# Patient Record
Sex: FEMALE | Race: WHITE | NOT HISPANIC OR LATINO | Employment: OTHER | URBAN - METROPOLITAN AREA
[De-identification: names, ages, dates, MRNs, and addresses within clinical notes are randomized per-mention and may not be internally consistent; named-entity substitution may affect disease eponyms.]

---

## 2017-01-21 ENCOUNTER — COMMUNICATION - HEALTHEAST (OUTPATIENT)
Dept: INTERNAL MEDICINE | Facility: CLINIC | Age: 72
End: 2017-01-21

## 2017-01-21 DIAGNOSIS — F34.1 DYSTHYMIC DISORDER: ICD-10-CM

## 2017-01-28 ENCOUNTER — COMMUNICATION - HEALTHEAST (OUTPATIENT)
Dept: INTERNAL MEDICINE | Facility: CLINIC | Age: 72
End: 2017-01-28

## 2017-01-28 DIAGNOSIS — I10 HTN (HYPERTENSION): ICD-10-CM

## 2017-01-30 ENCOUNTER — COMMUNICATION - HEALTHEAST (OUTPATIENT)
Dept: INTERNAL MEDICINE | Facility: CLINIC | Age: 72
End: 2017-01-30

## 2017-01-30 DIAGNOSIS — E78.5 HYPERLIPIDEMIA: ICD-10-CM

## 2017-02-06 ENCOUNTER — COMMUNICATION - HEALTHEAST (OUTPATIENT)
Dept: NURSING | Facility: CLINIC | Age: 72
End: 2017-02-06

## 2017-02-06 DIAGNOSIS — M54.16 LUMBAR RADICULAR PAIN: ICD-10-CM

## 2017-02-07 ENCOUNTER — COMMUNICATION - HEALTHEAST (OUTPATIENT)
Dept: INTERNAL MEDICINE | Facility: CLINIC | Age: 72
End: 2017-02-07

## 2017-02-07 DIAGNOSIS — M54.16 LUMBAR RADICULAR PAIN: ICD-10-CM

## 2017-02-07 DIAGNOSIS — E11.9 DIABETES (H): ICD-10-CM

## 2017-02-20 ENCOUNTER — COMMUNICATION - HEALTHEAST (OUTPATIENT)
Dept: INTERNAL MEDICINE | Facility: CLINIC | Age: 72
End: 2017-02-20

## 2017-02-20 DIAGNOSIS — E11.9 DIABETES (H): ICD-10-CM

## 2017-02-22 ENCOUNTER — COMMUNICATION - HEALTHEAST (OUTPATIENT)
Dept: NURSING | Facility: CLINIC | Age: 72
End: 2017-02-22

## 2017-02-23 ENCOUNTER — AMBULATORY - HEALTHEAST (OUTPATIENT)
Dept: CARE COORDINATION | Facility: CLINIC | Age: 72
End: 2017-02-23

## 2017-02-24 ENCOUNTER — OFFICE VISIT - HEALTHEAST (OUTPATIENT)
Dept: INTERNAL MEDICINE | Facility: CLINIC | Age: 72
End: 2017-02-24

## 2017-02-24 DIAGNOSIS — E78.5 HLD (HYPERLIPIDEMIA): ICD-10-CM

## 2017-02-24 DIAGNOSIS — I10 HTN (HYPERTENSION): ICD-10-CM

## 2017-02-24 DIAGNOSIS — N18.30 CKD (CHRONIC KIDNEY DISEASE) STAGE 3, GFR 30-59 ML/MIN (H): ICD-10-CM

## 2017-02-24 DIAGNOSIS — Z23 NEED FOR INFLUENZA VACCINATION: ICD-10-CM

## 2017-02-24 DIAGNOSIS — E83.42 HYPOMAGNESEMIA: ICD-10-CM

## 2017-02-24 DIAGNOSIS — M70.61 TROCHANTERIC BURSITIS OF BOTH HIPS: ICD-10-CM

## 2017-02-24 DIAGNOSIS — E11.9 DM TYPE 2 (DIABETES MELLITUS, TYPE 2) (H): ICD-10-CM

## 2017-02-24 DIAGNOSIS — M70.62 TROCHANTERIC BURSITIS OF BOTH HIPS: ICD-10-CM

## 2017-02-24 DIAGNOSIS — Z72.0 TOBACCO ABUSE: ICD-10-CM

## 2017-02-24 DIAGNOSIS — R19.7 DIARRHEA: ICD-10-CM

## 2017-02-24 DIAGNOSIS — M19.90 OA (OSTEOARTHRITIS): ICD-10-CM

## 2017-02-24 DIAGNOSIS — Z12.31 VISIT FOR SCREENING MAMMOGRAM: ICD-10-CM

## 2017-02-24 DIAGNOSIS — F34.1 DYSTHYMIA: ICD-10-CM

## 2017-02-24 LAB — HBA1C MFR BLD: 6.8 % (ref 3.5–6)

## 2017-02-26 ENCOUNTER — COMMUNICATION - HEALTHEAST (OUTPATIENT)
Dept: INTERNAL MEDICINE | Facility: CLINIC | Age: 72
End: 2017-02-26

## 2017-02-28 ENCOUNTER — HOSPITAL ENCOUNTER (OUTPATIENT)
Dept: MAMMOGRAPHY | Facility: HOSPITAL | Age: 72
Discharge: HOME | End: 2017-02-28
Attending: INTERNAL MEDICINE

## 2017-02-28 DIAGNOSIS — Z12.31 VISIT FOR SCREENING MAMMOGRAM: ICD-10-CM

## 2017-04-05 ENCOUNTER — COMMUNICATION - HEALTHEAST (OUTPATIENT)
Dept: SCHEDULING | Facility: CLINIC | Age: 72
End: 2017-04-05

## 2017-04-05 ENCOUNTER — COMMUNICATION - HEALTHEAST (OUTPATIENT)
Dept: INTERNAL MEDICINE | Facility: CLINIC | Age: 72
End: 2017-04-05

## 2017-04-05 DIAGNOSIS — E11.9 DM TYPE 2 (DIABETES MELLITUS, TYPE 2) (H): ICD-10-CM

## 2017-04-06 ENCOUNTER — COMMUNICATION - HEALTHEAST (OUTPATIENT)
Dept: SCHEDULING | Facility: CLINIC | Age: 72
End: 2017-04-06

## 2017-04-07 ENCOUNTER — RECORDS - HEALTHEAST (OUTPATIENT)
Dept: ADMINISTRATIVE | Facility: OTHER | Age: 72
End: 2017-04-07

## 2017-05-15 ENCOUNTER — COMMUNICATION - HEALTHEAST (OUTPATIENT)
Dept: INTERNAL MEDICINE | Facility: CLINIC | Age: 72
End: 2017-05-15

## 2017-05-15 DIAGNOSIS — E11.9 DIABETES (H): ICD-10-CM

## 2017-05-25 ENCOUNTER — COMMUNICATION - HEALTHEAST (OUTPATIENT)
Dept: INTERNAL MEDICINE | Facility: CLINIC | Age: 72
End: 2017-05-25

## 2017-05-25 DIAGNOSIS — E11.9 DM TYPE 2 (DIABETES MELLITUS, TYPE 2) (H): ICD-10-CM

## 2017-06-27 ENCOUNTER — COMMUNICATION - HEALTHEAST (OUTPATIENT)
Dept: INTERNAL MEDICINE | Facility: CLINIC | Age: 72
End: 2017-06-27

## 2017-07-18 ENCOUNTER — COMMUNICATION - HEALTHEAST (OUTPATIENT)
Dept: INTERNAL MEDICINE | Facility: CLINIC | Age: 72
End: 2017-07-18

## 2017-07-18 DIAGNOSIS — E11.9 DIABETES (H): ICD-10-CM

## 2017-07-23 ENCOUNTER — COMMUNICATION - HEALTHEAST (OUTPATIENT)
Dept: INTERNAL MEDICINE | Facility: CLINIC | Age: 72
End: 2017-07-23

## 2017-07-24 ENCOUNTER — COMMUNICATION - HEALTHEAST (OUTPATIENT)
Dept: INTERNAL MEDICINE | Facility: CLINIC | Age: 72
End: 2017-07-24

## 2017-07-24 DIAGNOSIS — F34.1 DYSTHYMIC DISORDER: ICD-10-CM

## 2017-07-28 ENCOUNTER — COMMUNICATION - HEALTHEAST (OUTPATIENT)
Dept: INTERNAL MEDICINE | Facility: CLINIC | Age: 72
End: 2017-07-28

## 2017-07-28 DIAGNOSIS — E78.5 HYPERLIPIDEMIA: ICD-10-CM

## 2017-07-28 DIAGNOSIS — E83.42 HYPOMAGNESEMIA: ICD-10-CM

## 2017-08-05 ENCOUNTER — COMMUNICATION - HEALTHEAST (OUTPATIENT)
Dept: INTERNAL MEDICINE | Facility: CLINIC | Age: 72
End: 2017-08-05

## 2017-08-05 DIAGNOSIS — I10 HTN (HYPERTENSION): ICD-10-CM

## 2017-08-28 ENCOUNTER — COMMUNICATION - HEALTHEAST (OUTPATIENT)
Dept: NURSING | Facility: CLINIC | Age: 72
End: 2017-08-28

## 2017-09-05 ENCOUNTER — COMMUNICATION - HEALTHEAST (OUTPATIENT)
Dept: NURSING | Facility: CLINIC | Age: 72
End: 2017-09-05

## 2017-09-08 ENCOUNTER — COMMUNICATION - HEALTHEAST (OUTPATIENT)
Dept: NURSING | Facility: CLINIC | Age: 72
End: 2017-09-08

## 2017-09-08 ENCOUNTER — OFFICE VISIT - HEALTHEAST (OUTPATIENT)
Dept: INTERNAL MEDICINE | Facility: CLINIC | Age: 72
End: 2017-09-08

## 2017-09-08 DIAGNOSIS — Z66 DNR (DO NOT RESUSCITATE): ICD-10-CM

## 2017-09-08 DIAGNOSIS — Z72.0 TOBACCO ABUSE: ICD-10-CM

## 2017-09-08 DIAGNOSIS — E78.5 HLD (HYPERLIPIDEMIA): ICD-10-CM

## 2017-09-08 DIAGNOSIS — F34.1 DYSTHYMIA: ICD-10-CM

## 2017-09-08 DIAGNOSIS — G62.9 PN (PERIPHERAL NEUROPATHY): ICD-10-CM

## 2017-09-08 DIAGNOSIS — I10 HTN (HYPERTENSION): ICD-10-CM

## 2017-09-08 DIAGNOSIS — M25.50 JOINT PAIN: ICD-10-CM

## 2017-09-08 DIAGNOSIS — E11.9 DM TYPE 2 (DIABETES MELLITUS, TYPE 2) (H): ICD-10-CM

## 2017-09-08 DIAGNOSIS — N18.30 CKD (CHRONIC KIDNEY DISEASE) STAGE 3, GFR 30-59 ML/MIN (H): ICD-10-CM

## 2017-09-08 LAB — HBA1C MFR BLD: 6.9 % (ref 3.5–6)

## 2017-09-12 ENCOUNTER — COMMUNICATION - HEALTHEAST (OUTPATIENT)
Dept: INTERNAL MEDICINE | Facility: CLINIC | Age: 72
End: 2017-09-12

## 2017-09-23 ENCOUNTER — COMMUNICATION - HEALTHEAST (OUTPATIENT)
Dept: INTERNAL MEDICINE | Facility: CLINIC | Age: 72
End: 2017-09-23

## 2017-09-23 DIAGNOSIS — M54.16 LUMBAR RADICULAR PAIN: ICD-10-CM

## 2017-09-25 ENCOUNTER — COMMUNICATION - HEALTHEAST (OUTPATIENT)
Dept: INTERNAL MEDICINE | Facility: CLINIC | Age: 72
End: 2017-09-25

## 2017-09-25 DIAGNOSIS — I10 HTN (HYPERTENSION): ICD-10-CM

## 2017-11-06 ENCOUNTER — COMMUNICATION - HEALTHEAST (OUTPATIENT)
Dept: INTERNAL MEDICINE | Facility: CLINIC | Age: 72
End: 2017-11-06

## 2017-11-06 DIAGNOSIS — E11.9 DM TYPE 2 (DIABETES MELLITUS, TYPE 2) (H): ICD-10-CM

## 2017-11-30 ENCOUNTER — COMMUNICATION - HEALTHEAST (OUTPATIENT)
Dept: INTERNAL MEDICINE | Facility: CLINIC | Age: 72
End: 2017-11-30

## 2017-12-20 ENCOUNTER — COMMUNICATION - HEALTHEAST (OUTPATIENT)
Dept: INTERNAL MEDICINE | Facility: CLINIC | Age: 72
End: 2017-12-20

## 2018-02-02 ENCOUNTER — COMMUNICATION - HEALTHEAST (OUTPATIENT)
Dept: INTERNAL MEDICINE | Facility: CLINIC | Age: 73
End: 2018-02-02

## 2018-02-18 ENCOUNTER — COMMUNICATION - HEALTHEAST (OUTPATIENT)
Dept: INTERNAL MEDICINE | Facility: CLINIC | Age: 73
End: 2018-02-18

## 2018-02-18 DIAGNOSIS — E11.9 DM TYPE 2 (DIABETES MELLITUS, TYPE 2) (H): ICD-10-CM

## 2018-02-19 ENCOUNTER — COMMUNICATION - HEALTHEAST (OUTPATIENT)
Dept: INTERNAL MEDICINE | Facility: CLINIC | Age: 73
End: 2018-02-19

## 2018-02-19 DIAGNOSIS — E83.42 HYPOMAGNESEMIA: ICD-10-CM

## 2018-02-28 ENCOUNTER — COMMUNICATION - HEALTHEAST (OUTPATIENT)
Dept: INTERNAL MEDICINE | Facility: CLINIC | Age: 73
End: 2018-02-28

## 2018-02-28 DIAGNOSIS — E11.9 DIABETES (H): ICD-10-CM

## 2018-03-01 ENCOUNTER — COMMUNICATION - HEALTHEAST (OUTPATIENT)
Dept: LAB | Facility: CLINIC | Age: 73
End: 2018-03-01

## 2018-03-01 DIAGNOSIS — I10 HTN (HYPERTENSION): ICD-10-CM

## 2018-03-01 DIAGNOSIS — E11.9 DM TYPE 2 (DIABETES MELLITUS, TYPE 2) (H): ICD-10-CM

## 2018-03-01 DIAGNOSIS — E78.5 HLD (HYPERLIPIDEMIA): ICD-10-CM

## 2018-03-08 ENCOUNTER — AMBULATORY - HEALTHEAST (OUTPATIENT)
Dept: LAB | Facility: CLINIC | Age: 73
End: 2018-03-08

## 2018-03-08 DIAGNOSIS — I10 HTN (HYPERTENSION): ICD-10-CM

## 2018-03-08 DIAGNOSIS — E11.9 DM TYPE 2 (DIABETES MELLITUS, TYPE 2) (H): ICD-10-CM

## 2018-03-08 DIAGNOSIS — E78.5 HLD (HYPERLIPIDEMIA): ICD-10-CM

## 2018-03-08 DIAGNOSIS — E87.1 HYPONATREMIA: ICD-10-CM

## 2018-03-08 LAB
ANION GAP SERPL CALCULATED.3IONS-SCNC: 11 MMOL/L (ref 5–18)
BUN SERPL-MCNC: 12 MG/DL (ref 8–28)
CALCIUM SERPL-MCNC: 9.3 MG/DL (ref 8.5–10.5)
CHLORIDE BLD-SCNC: 94 MMOL/L (ref 98–107)
CO2 SERPL-SCNC: 23 MMOL/L (ref 22–31)
CREAT SERPL-MCNC: 0.9 MG/DL (ref 0.6–1.1)
GFR SERPL CREATININE-BSD FRML MDRD: >60 ML/MIN/1.73M2
GLUCOSE BLD-MCNC: 97 MG/DL (ref 70–125)
HBA1C MFR BLD: 6.9 % (ref 3.5–6)
POTASSIUM BLD-SCNC: 4.8 MMOL/L (ref 3.5–5)
SODIUM SERPL-SCNC: 128 MMOL/L (ref 136–145)

## 2018-03-12 LAB — CORTIS SERPL-MCNC: 12.9 UG/DL

## 2018-03-14 LAB — C PEPTIDE SERPL-MCNC: 2.4 NG/ML (ref 0.9–6.9)

## 2018-03-15 ENCOUNTER — COMMUNICATION - HEALTHEAST (OUTPATIENT)
Dept: INTERNAL MEDICINE | Facility: CLINIC | Age: 73
End: 2018-03-15

## 2018-04-12 ENCOUNTER — COMMUNICATION - HEALTHEAST (OUTPATIENT)
Dept: LAB | Facility: CLINIC | Age: 73
End: 2018-04-12

## 2018-04-12 DIAGNOSIS — N18.30 CKD (CHRONIC KIDNEY DISEASE) STAGE 3, GFR 30-59 ML/MIN (H): ICD-10-CM

## 2018-04-12 DIAGNOSIS — I10 HTN (HYPERTENSION): ICD-10-CM

## 2018-04-12 DIAGNOSIS — E78.5 HYPERLIPIDEMIA: ICD-10-CM

## 2018-04-16 ENCOUNTER — COMMUNICATION - HEALTHEAST (OUTPATIENT)
Dept: INTERNAL MEDICINE | Facility: CLINIC | Age: 73
End: 2018-04-16

## 2018-04-16 DIAGNOSIS — E78.5 HYPERLIPIDEMIA: ICD-10-CM

## 2018-05-21 ENCOUNTER — OFFICE VISIT - HEALTHEAST (OUTPATIENT)
Dept: INTERNAL MEDICINE | Facility: CLINIC | Age: 73
End: 2018-05-21

## 2018-05-21 DIAGNOSIS — I10 HTN (HYPERTENSION): ICD-10-CM

## 2018-05-21 DIAGNOSIS — M54.50 LOW BACK PAIN: ICD-10-CM

## 2018-05-21 DIAGNOSIS — E87.1 HYPONATREMIA: ICD-10-CM

## 2018-05-21 DIAGNOSIS — E83.42 HYPOMAGNESEMIA: ICD-10-CM

## 2018-05-21 DIAGNOSIS — E11.9 DM TYPE 2 (DIABETES MELLITUS, TYPE 2) (H): ICD-10-CM

## 2018-05-21 DIAGNOSIS — F34.1 DYSTHYMIA: ICD-10-CM

## 2018-05-21 DIAGNOSIS — N18.30 CKD (CHRONIC KIDNEY DISEASE) STAGE 3, GFR 30-59 ML/MIN (H): ICD-10-CM

## 2018-05-21 DIAGNOSIS — Z72.0 TOBACCO ABUSE: ICD-10-CM

## 2018-05-28 ENCOUNTER — COMMUNICATION - HEALTHEAST (OUTPATIENT)
Dept: INTERNAL MEDICINE | Facility: CLINIC | Age: 73
End: 2018-05-28

## 2018-05-28 DIAGNOSIS — E11.9 DIABETES (H): ICD-10-CM

## 2018-05-28 DIAGNOSIS — E11.9 DM TYPE 2 (DIABETES MELLITUS, TYPE 2) (H): ICD-10-CM

## 2018-06-14 ENCOUNTER — COMMUNICATION - HEALTHEAST (OUTPATIENT)
Dept: LAB | Facility: CLINIC | Age: 73
End: 2018-06-14

## 2018-06-14 DIAGNOSIS — I10 HTN (HYPERTENSION): ICD-10-CM

## 2018-07-02 ENCOUNTER — COMMUNICATION - HEALTHEAST (OUTPATIENT)
Dept: INTERNAL MEDICINE | Facility: CLINIC | Age: 73
End: 2018-07-02

## 2018-07-02 DIAGNOSIS — E11.9 DM TYPE 2 (DIABETES MELLITUS, TYPE 2) (H): ICD-10-CM

## 2018-07-20 ENCOUNTER — COMMUNICATION - HEALTHEAST (OUTPATIENT)
Dept: INTERNAL MEDICINE | Facility: CLINIC | Age: 73
End: 2018-07-20

## 2018-07-20 DIAGNOSIS — M54.16 LUMBAR RADICULAR PAIN: ICD-10-CM

## 2018-07-30 ENCOUNTER — COMMUNICATION - HEALTHEAST (OUTPATIENT)
Dept: INTERNAL MEDICINE | Facility: CLINIC | Age: 73
End: 2018-07-30

## 2018-07-30 DIAGNOSIS — E11.9 DM TYPE 2 (DIABETES MELLITUS, TYPE 2) (H): ICD-10-CM

## 2018-08-08 ENCOUNTER — COMMUNICATION - HEALTHEAST (OUTPATIENT)
Dept: INTERNAL MEDICINE | Facility: CLINIC | Age: 73
End: 2018-08-08

## 2018-08-18 ENCOUNTER — COMMUNICATION - HEALTHEAST (OUTPATIENT)
Dept: INTERNAL MEDICINE | Facility: CLINIC | Age: 73
End: 2018-08-18

## 2018-08-18 DIAGNOSIS — E83.42 HYPOMAGNESEMIA: ICD-10-CM

## 2018-08-24 ENCOUNTER — COMMUNICATION - HEALTHEAST (OUTPATIENT)
Dept: INTERNAL MEDICINE | Facility: CLINIC | Age: 73
End: 2018-08-24

## 2018-08-24 DIAGNOSIS — F34.1 DYSTHYMIC DISORDER: ICD-10-CM

## 2018-09-18 ENCOUNTER — COMMUNICATION - HEALTHEAST (OUTPATIENT)
Dept: INTERNAL MEDICINE | Facility: CLINIC | Age: 73
End: 2018-09-18

## 2018-10-16 ENCOUNTER — RECORDS - HEALTHEAST (OUTPATIENT)
Dept: ADMINISTRATIVE | Facility: OTHER | Age: 73
End: 2018-10-16

## 2018-10-18 ENCOUNTER — COMMUNICATION - HEALTHEAST (OUTPATIENT)
Dept: INTERNAL MEDICINE | Facility: CLINIC | Age: 73
End: 2018-10-18

## 2018-10-18 DIAGNOSIS — E78.5 HYPERLIPIDEMIA: ICD-10-CM

## 2018-10-18 DIAGNOSIS — I10 HTN (HYPERTENSION): ICD-10-CM

## 2018-10-25 ENCOUNTER — COMMUNICATION - HEALTHEAST (OUTPATIENT)
Dept: INTERNAL MEDICINE | Facility: CLINIC | Age: 73
End: 2018-10-25

## 2018-10-25 DIAGNOSIS — I10 HTN (HYPERTENSION): ICD-10-CM

## 2019-01-14 ENCOUNTER — COMMUNICATION - HEALTHEAST (OUTPATIENT)
Dept: INTERNAL MEDICINE | Facility: CLINIC | Age: 74
End: 2019-01-14

## 2019-01-14 ENCOUNTER — OFFICE VISIT - HEALTHEAST (OUTPATIENT)
Dept: INTERNAL MEDICINE | Facility: CLINIC | Age: 74
End: 2019-01-14

## 2019-01-14 DIAGNOSIS — E83.42 HYPOMAGNESEMIA: ICD-10-CM

## 2019-01-14 DIAGNOSIS — M25.552 HIP PAIN, LEFT: ICD-10-CM

## 2019-01-14 DIAGNOSIS — F32.1 MODERATE MAJOR DEPRESSION (H): ICD-10-CM
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
## 2019-01-14 DIAGNOSIS — G63 POLYNEUROPATHY ASSOCIATED WITH UNDERLYING DISEASE (H): ICD-10-CM

## 2019-01-14 DIAGNOSIS — I10 ESSENTIAL HYPERTENSION: ICD-10-CM

## 2019-01-14 DIAGNOSIS — D12.6 TUBULAR ADENOMA OF COLON: ICD-10-CM

## 2019-01-14 DIAGNOSIS — Z79.4 TYPE 2 DIABETES MELLITUS WITH DIABETIC POLYNEUROPATHY, WITH LONG-TERM CURRENT USE OF INSULIN (H): ICD-10-CM

## 2019-01-14 DIAGNOSIS — Z12.31 VISIT FOR SCREENING MAMMOGRAM: ICD-10-CM

## 2019-01-14 DIAGNOSIS — N18.30 CKD (CHRONIC KIDNEY DISEASE) STAGE 3, GFR 30-59 ML/MIN (H): ICD-10-CM

## 2019-01-14 DIAGNOSIS — E11.42 TYPE 2 DIABETES MELLITUS WITH DIABETIC POLYNEUROPATHY, WITH LONG-TERM CURRENT USE OF INSULIN (H): ICD-10-CM

## 2019-01-14 DIAGNOSIS — N39.46 MIXED INCONTINENCE: ICD-10-CM

## 2019-01-14 DIAGNOSIS — Z72.0 TOBACCO ABUSE: ICD-10-CM

## 2019-01-14 LAB
ALBUMIN SERPL-MCNC: 4.1 G/DL (ref 3.5–5)
ALP SERPL-CCNC: 81 U/L (ref 45–120)
ALT SERPL W P-5'-P-CCNC: 12 U/L (ref 0–45)
ANION GAP SERPL CALCULATED.3IONS-SCNC: 12 MMOL/L (ref 5–18)
AST SERPL W P-5'-P-CCNC: 14 U/L (ref 0–40)
BILIRUB SERPL-MCNC: 0.3 MG/DL (ref 0–1)
BUN SERPL-MCNC: 21 MG/DL (ref 8–28)
CALCIUM SERPL-MCNC: 9.6 MG/DL (ref 8.5–10.5)
CHLORIDE BLD-SCNC: 97 MMOL/L (ref 98–107)
CHOLEST SERPL-MCNC: 177 MG/DL
CO2 SERPL-SCNC: 23 MMOL/L (ref 22–31)
CREAT SERPL-MCNC: 1.13 MG/DL (ref 0.6–1.1)
FASTING STATUS PATIENT QL REPORTED: YES
GFR SERPL CREATININE-BSD FRML MDRD: 47 ML/MIN/1.73M2
GLUCOSE BLD-MCNC: 158 MG/DL (ref 70–125)
HBA1C MFR BLD: 6.2 % (ref 3.5–6)
HDLC SERPL-MCNC: 62 MG/DL
LDLC SERPL CALC-MCNC: 96 MG/DL
MAGNESIUM SERPL-MCNC: 2.1 MG/DL (ref 1.8–2.6)
POTASSIUM BLD-SCNC: 5 MMOL/L (ref 3.5–5)
PROT SERPL-MCNC: 7 G/DL (ref 6–8)
SODIUM SERPL-SCNC: 132 MMOL/L (ref 136–145)
TRIGL SERPL-MCNC: 95 MG/DL

## 2019-01-14 ASSESSMENT — MIFFLIN-ST. JEOR: SCORE: 1404.65

## 2019-01-18 ENCOUNTER — COMMUNICATION - HEALTHEAST (OUTPATIENT)
Dept: INTERNAL MEDICINE | Facility: CLINIC | Age: 74
End: 2019-01-18

## 2019-02-04 ENCOUNTER — COMMUNICATION - HEALTHEAST (OUTPATIENT)
Dept: INTERNAL MEDICINE | Facility: CLINIC | Age: 74
End: 2019-02-04

## 2019-02-15 ENCOUNTER — COMMUNICATION - HEALTHEAST (OUTPATIENT)
Dept: INTERNAL MEDICINE | Facility: CLINIC | Age: 74
End: 2019-02-15

## 2019-02-20 ENCOUNTER — COMMUNICATION - HEALTHEAST (OUTPATIENT)
Dept: INTERNAL MEDICINE | Facility: CLINIC | Age: 74
End: 2019-02-20

## 2019-03-04 ENCOUNTER — HOSPITAL ENCOUNTER (OUTPATIENT)
Dept: MAMMOGRAPHY | Facility: CLINIC | Age: 74
Discharge: HOME | End: 2019-03-04
Attending: INTERNAL MEDICINE

## 2019-03-04 DIAGNOSIS — Z12.31 VISIT FOR SCREENING MAMMOGRAM: ICD-10-CM

## 2019-04-16 ENCOUNTER — COMMUNICATION - HEALTHEAST (OUTPATIENT)
Dept: INTERNAL MEDICINE | Facility: CLINIC | Age: 74
End: 2019-04-16

## 2019-04-16 DIAGNOSIS — M54.16 LUMBAR RADICULAR PAIN: ICD-10-CM

## 2019-04-16 DIAGNOSIS — I10 HTN (HYPERTENSION): ICD-10-CM

## 2019-04-16 DIAGNOSIS — E78.5 HYPERLIPIDEMIA: ICD-10-CM

## 2019-05-13 ENCOUNTER — COMMUNICATION - HEALTHEAST (OUTPATIENT)
Dept: INTERNAL MEDICINE | Facility: CLINIC | Age: 74
End: 2019-05-13

## 2019-05-13 ENCOUNTER — OFFICE VISIT - HEALTHEAST (OUTPATIENT)
Dept: INTERNAL MEDICINE | Facility: CLINIC | Age: 74
End: 2019-05-13

## 2019-05-13 DIAGNOSIS — Z72.0 TOBACCO ABUSE: ICD-10-CM

## 2019-05-13 DIAGNOSIS — N39.46 MIXED INCONTINENCE: ICD-10-CM

## 2019-05-13 DIAGNOSIS — M70.61 TROCHANTERIC BURSITIS OF BOTH HIPS: ICD-10-CM

## 2019-05-13 DIAGNOSIS — Z79.899 ON ANGIOTENSIN-CONVERTING ENZYME (ACE) INHIBITORS: ICD-10-CM

## 2019-05-13 DIAGNOSIS — R06.02 SOB (SHORTNESS OF BREATH): ICD-10-CM

## 2019-05-13 DIAGNOSIS — M70.62 TROCHANTERIC BURSITIS OF BOTH HIPS: ICD-10-CM

## 2019-05-13 DIAGNOSIS — I10 ESSENTIAL HYPERTENSION: ICD-10-CM

## 2019-05-13 DIAGNOSIS — Z79.4 TYPE 2 DIABETES MELLITUS WITH DIABETIC POLYNEUROPATHY, WITH LONG-TERM CURRENT USE OF INSULIN (H): ICD-10-CM

## 2019-05-13 DIAGNOSIS — E11.42 TYPE 2 DIABETES MELLITUS WITH DIABETIC POLYNEUROPATHY, WITH LONG-TERM CURRENT USE OF INSULIN (H): ICD-10-CM

## 2019-05-13 DIAGNOSIS — F32.1 MODERATE MAJOR DEPRESSION (H): ICD-10-CM

## 2019-05-13 LAB
ANION GAP SERPL CALCULATED.3IONS-SCNC: 14 MMOL/L (ref 5–18)
BUN SERPL-MCNC: 12 MG/DL (ref 8–28)
CALCIUM SERPL-MCNC: 9.8 MG/DL (ref 8.5–10.5)
CHLORIDE BLD-SCNC: 94 MMOL/L (ref 98–107)
CO2 SERPL-SCNC: 21 MMOL/L (ref 22–31)
CREAT SERPL-MCNC: 1.1 MG/DL (ref 0.6–1.1)
GFR SERPL CREATININE-BSD FRML MDRD: 49 ML/MIN/1.73M2
GLUCOSE BLD-MCNC: 105 MG/DL (ref 70–125)
HBA1C MFR BLD: 6.9 % (ref 3.5–6)
POTASSIUM BLD-SCNC: 5 MMOL/L (ref 3.5–5)
SODIUM SERPL-SCNC: 129 MMOL/L (ref 136–145)

## 2019-05-21 ENCOUNTER — COMMUNICATION - HEALTHEAST (OUTPATIENT)
Dept: INTERNAL MEDICINE | Facility: CLINIC | Age: 74
End: 2019-05-21

## 2019-05-21 DIAGNOSIS — E83.42 HYPOMAGNESEMIA: ICD-10-CM

## 2019-05-29 ENCOUNTER — COMMUNICATION - HEALTHEAST (OUTPATIENT)
Dept: INTERNAL MEDICINE | Facility: CLINIC | Age: 74
End: 2019-05-29

## 2019-05-29 DIAGNOSIS — F34.1 DYSTHYMIC DISORDER: ICD-10-CM

## 2019-06-04 ENCOUNTER — RECORDS - HEALTHEAST (OUTPATIENT)
Dept: HEALTH INFORMATION MANAGEMENT | Facility: CLINIC | Age: 74
End: 2019-06-04

## 2019-07-02 ENCOUNTER — COMMUNICATION - HEALTHEAST (OUTPATIENT)
Dept: INTERNAL MEDICINE | Facility: CLINIC | Age: 74
End: 2019-07-02

## 2019-07-02 DIAGNOSIS — Z79.4 TYPE 2 DIABETES MELLITUS WITH DIABETIC POLYNEUROPATHY, WITH LONG-TERM CURRENT USE OF INSULIN (H): ICD-10-CM

## 2019-07-02 DIAGNOSIS — E11.42 TYPE 2 DIABETES MELLITUS WITH DIABETIC POLYNEUROPATHY, WITH LONG-TERM CURRENT USE OF INSULIN (H): ICD-10-CM

## 2019-07-23 ENCOUNTER — COMMUNICATION - HEALTHEAST (OUTPATIENT)
Dept: INTERNAL MEDICINE | Facility: CLINIC | Age: 74
End: 2019-07-23

## 2019-07-23 DIAGNOSIS — E11.42 TYPE 2 DIABETES MELLITUS WITH DIABETIC POLYNEUROPATHY, WITH LONG-TERM CURRENT USE OF INSULIN (H): ICD-10-CM

## 2019-07-23 DIAGNOSIS — Z79.4 TYPE 2 DIABETES MELLITUS WITH DIABETIC POLYNEUROPATHY, WITH LONG-TERM CURRENT USE OF INSULIN (H): ICD-10-CM

## 2019-08-23 ENCOUNTER — COMMUNICATION - HEALTHEAST (OUTPATIENT)
Dept: INTERNAL MEDICINE | Facility: CLINIC | Age: 74
End: 2019-08-23

## 2019-08-23 DIAGNOSIS — E11.9 DM TYPE 2 (DIABETES MELLITUS, TYPE 2) (H): ICD-10-CM

## 2019-09-23 ENCOUNTER — COMMUNICATION - HEALTHEAST (OUTPATIENT)
Dept: INTERNAL MEDICINE | Facility: CLINIC | Age: 74
End: 2019-09-23

## 2019-09-23 DIAGNOSIS — I10 ESSENTIAL HYPERTENSION: ICD-10-CM

## 2019-09-26 ENCOUNTER — COMMUNICATION - HEALTHEAST (OUTPATIENT)
Dept: INTERNAL MEDICINE | Facility: CLINIC | Age: 74
End: 2019-09-26

## 2019-09-26 DIAGNOSIS — E11.9 DM TYPE 2 (DIABETES MELLITUS, TYPE 2) (H): ICD-10-CM

## 2019-11-24 ENCOUNTER — COMMUNICATION - HEALTHEAST (OUTPATIENT)
Dept: INTERNAL MEDICINE | Facility: CLINIC | Age: 74
End: 2019-11-24

## 2020-01-08 ENCOUNTER — COMMUNICATION - HEALTHEAST (OUTPATIENT)
Dept: INTERNAL MEDICINE | Facility: CLINIC | Age: 75
End: 2020-01-08

## 2020-01-08 DIAGNOSIS — E78.5 HYPERLIPIDEMIA: ICD-10-CM

## 2020-01-08 DIAGNOSIS — M54.16 LUMBAR RADICULAR PAIN: ICD-10-CM

## 2020-01-08 DIAGNOSIS — I10 HTN (HYPERTENSION): ICD-10-CM

## 2020-01-31 ENCOUNTER — OFFICE VISIT - HEALTHEAST (OUTPATIENT)
Dept: INTERNAL MEDICINE | Facility: CLINIC | Age: 75
End: 2020-01-31

## 2020-01-31 DIAGNOSIS — F32.1 MODERATE MAJOR DEPRESSION (H): ICD-10-CM

## 2020-01-31 DIAGNOSIS — E11.42 TYPE 2 DIABETES MELLITUS WITH DIABETIC POLYNEUROPATHY, WITH LONG-TERM CURRENT USE OF INSULIN (H): ICD-10-CM

## 2020-01-31 DIAGNOSIS — M79.676 PAIN AROUND TOENAIL: ICD-10-CM

## 2020-01-31 DIAGNOSIS — Z72.0 TOBACCO ABUSE: ICD-10-CM

## 2020-01-31 DIAGNOSIS — E87.5 HYPERKALEMIA: ICD-10-CM

## 2020-01-31 DIAGNOSIS — E87.1 HYPONATREMIA: ICD-10-CM

## 2020-01-31 DIAGNOSIS — Z79.4 TYPE 2 DIABETES MELLITUS WITH DIABETIC POLYNEUROPATHY, WITH LONG-TERM CURRENT USE OF INSULIN (H): ICD-10-CM

## 2020-01-31 DIAGNOSIS — L82.1 SK (SEBORRHEIC KERATOSIS): ICD-10-CM

## 2020-01-31 DIAGNOSIS — I10 ESSENTIAL HYPERTENSION: ICD-10-CM

## 2020-01-31 DIAGNOSIS — N18.30 CKD (CHRONIC KIDNEY DISEASE) STAGE 3, GFR 30-59 ML/MIN (H): ICD-10-CM

## 2020-01-31 DIAGNOSIS — L57.0 AK (ACTINIC KERATOSIS): ICD-10-CM

## 2020-01-31 DIAGNOSIS — G63 POLYNEUROPATHY ASSOCIATED WITH UNDERLYING DISEASE (H): ICD-10-CM

## 2020-01-31 LAB
ANION GAP SERPL CALCULATED.3IONS-SCNC: 13 MMOL/L (ref 5–18)
BUN SERPL-MCNC: 10 MG/DL (ref 8–28)
CALCIUM SERPL-MCNC: 9.7 MG/DL (ref 8.5–10.5)
CHLORIDE BLD-SCNC: 88 MMOL/L (ref 98–107)
CO2 SERPL-SCNC: 23 MMOL/L (ref 22–31)
CREAT SERPL-MCNC: 1.04 MG/DL (ref 0.6–1.1)
GFR SERPL CREATININE-BSD FRML MDRD: 52 ML/MIN/1.73M2
GLUCOSE BLD-MCNC: 107 MG/DL (ref 70–125)
HBA1C MFR BLD: 6.3 % (ref 3.5–6)
POTASSIUM BLD-SCNC: 5.5 MMOL/L (ref 3.5–5)
SODIUM SERPL-SCNC: 124 MMOL/L (ref 136–145)

## 2020-01-31 ASSESSMENT — MIFFLIN-ST. JEOR: SCORE: 1387.98

## 2020-02-03 ENCOUNTER — COMMUNICATION - HEALTHEAST (OUTPATIENT)
Dept: INTERNAL MEDICINE | Facility: CLINIC | Age: 75
End: 2020-02-03

## 2020-02-03 DIAGNOSIS — F34.1 DYSTHYMIC DISORDER: ICD-10-CM

## 2020-02-03 LAB — CORTIS SERPL-MCNC: 10.1 UG/DL

## 2020-02-21 ENCOUNTER — OFFICE VISIT - HEALTHEAST (OUTPATIENT)
Dept: INTERNAL MEDICINE | Facility: CLINIC | Age: 75
End: 2020-02-21

## 2020-02-21 DIAGNOSIS — E87.1 HYPONATREMIA: ICD-10-CM

## 2020-02-21 DIAGNOSIS — E11.42 TYPE 2 DIABETES MELLITUS WITH DIABETIC POLYNEUROPATHY, WITH LONG-TERM CURRENT USE OF INSULIN (H): ICD-10-CM

## 2020-02-21 DIAGNOSIS — F32.1 MODERATE MAJOR DEPRESSION (H): ICD-10-CM

## 2020-02-21 DIAGNOSIS — N18.30 CKD (CHRONIC KIDNEY DISEASE) STAGE 3, GFR 30-59 ML/MIN (H): ICD-10-CM

## 2020-02-21 DIAGNOSIS — E87.5 DRUG-INDUCED HYPERKALEMIA: ICD-10-CM

## 2020-02-21 DIAGNOSIS — T50.905A DRUG-INDUCED HYPERKALEMIA: ICD-10-CM

## 2020-02-21 DIAGNOSIS — Z79.4 TYPE 2 DIABETES MELLITUS WITH DIABETIC POLYNEUROPATHY, WITH LONG-TERM CURRENT USE OF INSULIN (H): ICD-10-CM

## 2020-02-21 LAB
ANION GAP SERPL CALCULATED.3IONS-SCNC: 13 MMOL/L (ref 5–18)
BUN SERPL-MCNC: 14 MG/DL (ref 8–28)
CALCIUM SERPL-MCNC: 9.3 MG/DL (ref 8.5–10.5)
CHLORIDE BLD-SCNC: 93 MMOL/L (ref 98–107)
CO2 SERPL-SCNC: 23 MMOL/L (ref 22–31)
CORTIS SERPL-MCNC: 11.9 UG/DL
CREAT SERPL-MCNC: 1.09 MG/DL (ref 0.6–1.1)
CREAT UR-MCNC: 35.1 MG/DL
GFR SERPL CREATININE-BSD FRML MDRD: 49 ML/MIN/1.73M2
GLUCOSE BLD-MCNC: 100 MG/DL (ref 70–125)
MICROALBUMIN UR-MCNC: 7.2 MG/DL (ref 0–1.99)
MICROALBUMIN/CREAT UR: 205.1 MG/G
OSMOLALITY SERPL: 268 MOSM/KG (ref 270–300)
OSMOLALITY UR: 176 MOSM/KG (ref 300–900)
POTASSIUM BLD-SCNC: 4.5 MMOL/L (ref 3.5–5)
SODIUM SERPL-SCNC: 129 MMOL/L (ref 136–145)
SODIUM UR-SCNC: <20 MMOL/L

## 2020-02-25 ENCOUNTER — COMMUNICATION - HEALTHEAST (OUTPATIENT)
Dept: INTERNAL MEDICINE | Facility: CLINIC | Age: 75
End: 2020-02-25

## 2020-03-06 ENCOUNTER — COMMUNICATION - HEALTHEAST (OUTPATIENT)
Dept: INTERNAL MEDICINE | Facility: CLINIC | Age: 75
End: 2020-03-06

## 2020-04-02 ENCOUNTER — COMMUNICATION - HEALTHEAST (OUTPATIENT)
Dept: SCHEDULING | Facility: CLINIC | Age: 75
End: 2020-04-02

## 2020-04-02 ENCOUNTER — COMMUNICATION - HEALTHEAST (OUTPATIENT)
Dept: INTERNAL MEDICINE | Facility: CLINIC | Age: 75
End: 2020-04-02

## 2020-04-02 DIAGNOSIS — E78.5 HYPERLIPIDEMIA: ICD-10-CM

## 2020-04-02 DIAGNOSIS — I10 HTN (HYPERTENSION): ICD-10-CM

## 2020-04-02 DIAGNOSIS — M54.16 LUMBAR RADICULAR PAIN: ICD-10-CM

## 2020-04-02 DIAGNOSIS — F34.1 DYSTHYMIC DISORDER: ICD-10-CM

## 2020-05-06 ENCOUNTER — COMMUNICATION - HEALTHEAST (OUTPATIENT)
Dept: INTERNAL MEDICINE | Facility: CLINIC | Age: 75
End: 2020-05-06

## 2020-05-06 DIAGNOSIS — N39.46 MIXED INCONTINENCE: ICD-10-CM

## 2020-06-29 ENCOUNTER — COMMUNICATION - HEALTHEAST (OUTPATIENT)
Dept: INTERNAL MEDICINE | Facility: CLINIC | Age: 75
End: 2020-06-29

## 2020-06-29 DIAGNOSIS — F32.1 MODERATE MAJOR DEPRESSION (H): ICD-10-CM

## 2020-07-29 ENCOUNTER — COMMUNICATION - HEALTHEAST (OUTPATIENT)
Dept: INTERNAL MEDICINE | Facility: CLINIC | Age: 75
End: 2020-07-29

## 2020-07-29 DIAGNOSIS — E11.42 TYPE 2 DIABETES MELLITUS WITH DIABETIC POLYNEUROPATHY, WITH LONG-TERM CURRENT USE OF INSULIN (H): ICD-10-CM

## 2020-07-29 DIAGNOSIS — Z79.4 TYPE 2 DIABETES MELLITUS WITH DIABETIC POLYNEUROPATHY, WITH LONG-TERM CURRENT USE OF INSULIN (H): ICD-10-CM

## 2020-07-29 RX ORDER — METFORMIN HCL 500 MG
2000 TABLET, EXTENDED RELEASE 24 HR ORAL DAILY
Qty: 360 TABLET | Refills: 3 | Status: SHIPPED | OUTPATIENT
Start: 2020-07-29 | End: 2021-07-21

## 2020-07-30 ENCOUNTER — COMMUNICATION - HEALTHEAST (OUTPATIENT)
Dept: INTERNAL MEDICINE | Facility: CLINIC | Age: 75
End: 2020-07-30

## 2020-07-30 DIAGNOSIS — E11.42 TYPE 2 DIABETES MELLITUS WITH DIABETIC POLYNEUROPATHY, WITH LONG-TERM CURRENT USE OF INSULIN (H): ICD-10-CM

## 2020-07-30 DIAGNOSIS — Z79.4 TYPE 2 DIABETES MELLITUS WITH DIABETIC POLYNEUROPATHY, WITH LONG-TERM CURRENT USE OF INSULIN (H): ICD-10-CM

## 2020-08-21 ENCOUNTER — OFFICE VISIT - HEALTHEAST (OUTPATIENT)
Dept: INTERNAL MEDICINE | Facility: CLINIC | Age: 75
End: 2020-08-21

## 2020-08-21 DIAGNOSIS — R80.9 MICROALBUMINURIA DUE TO TYPE 2 DIABETES MELLITUS (H): ICD-10-CM

## 2020-08-21 DIAGNOSIS — E11.42 TYPE 2 DIABETES MELLITUS WITH DIABETIC POLYNEUROPATHY, WITH LONG-TERM CURRENT USE OF INSULIN (H): ICD-10-CM

## 2020-08-21 DIAGNOSIS — Z02.89 ENCOUNTER FOR COMPLETION OF FORM WITH PATIENT: ICD-10-CM

## 2020-08-21 DIAGNOSIS — Z79.4 TYPE 2 DIABETES MELLITUS WITH DIABETIC POLYNEUROPATHY, WITH LONG-TERM CURRENT USE OF INSULIN (H): ICD-10-CM

## 2020-08-21 DIAGNOSIS — I10 ESSENTIAL HYPERTENSION: ICD-10-CM

## 2020-08-21 DIAGNOSIS — N18.30 CKD (CHRONIC KIDNEY DISEASE) STAGE 3, GFR 30-59 ML/MIN (H): ICD-10-CM

## 2020-08-21 DIAGNOSIS — E87.1 HYPONATREMIA: ICD-10-CM

## 2020-08-21 DIAGNOSIS — N39.46 MIXED INCONTINENCE: ICD-10-CM

## 2020-08-21 DIAGNOSIS — M54.50 CHRONIC MIDLINE LOW BACK PAIN, UNSPECIFIED WHETHER SCIATICA PRESENT: ICD-10-CM

## 2020-08-21 DIAGNOSIS — E11.29 MICROALBUMINURIA DUE TO TYPE 2 DIABETES MELLITUS (H): ICD-10-CM

## 2020-08-21 DIAGNOSIS — G89.29 CHRONIC MIDLINE LOW BACK PAIN, UNSPECIFIED WHETHER SCIATICA PRESENT: ICD-10-CM

## 2020-08-21 LAB
ALBUMIN SERPL-MCNC: 4 G/DL (ref 3.5–5)
ALP SERPL-CCNC: 96 U/L (ref 45–120)
ALT SERPL W P-5'-P-CCNC: 15 U/L (ref 0–45)
ANION GAP SERPL CALCULATED.3IONS-SCNC: 11 MMOL/L (ref 5–18)
AST SERPL W P-5'-P-CCNC: 14 U/L (ref 0–40)
BILIRUB SERPL-MCNC: 0.3 MG/DL (ref 0–1)
BUN SERPL-MCNC: 17 MG/DL (ref 8–28)
CALCIUM SERPL-MCNC: 9.4 MG/DL (ref 8.5–10.5)
CHLORIDE BLD-SCNC: 99 MMOL/L (ref 98–107)
CHOLEST SERPL-MCNC: 172 MG/DL
CO2 SERPL-SCNC: 23 MMOL/L (ref 22–31)
CREAT SERPL-MCNC: 1.04 MG/DL (ref 0.6–1.1)
CREAT UR-MCNC: 43.2 MG/DL
FASTING STATUS PATIENT QL REPORTED: YES
GFR SERPL CREATININE-BSD FRML MDRD: 52 ML/MIN/1.73M2
GLUCOSE BLD-MCNC: 175 MG/DL (ref 70–125)
HBA1C MFR BLD: 7.5 %
HDLC SERPL-MCNC: 55 MG/DL
LDLC SERPL CALC-MCNC: 91 MG/DL
MICROALBUMIN UR-MCNC: 8.08 MG/DL (ref 0–1.99)
MICROALBUMIN/CREAT UR: 187 MG/G
POTASSIUM BLD-SCNC: 4.9 MMOL/L (ref 3.5–5)
PROT SERPL-MCNC: 6.8 G/DL (ref 6–8)
SODIUM SERPL-SCNC: 133 MMOL/L (ref 136–145)
SODIUM UR-SCNC: 22 MMOL/L
TRIGL SERPL-MCNC: 129 MG/DL

## 2020-08-23 ENCOUNTER — COMMUNICATION - HEALTHEAST (OUTPATIENT)
Dept: INTERNAL MEDICINE | Facility: CLINIC | Age: 75
End: 2020-08-23

## 2020-08-23 DIAGNOSIS — I10 ESSENTIAL HYPERTENSION: ICD-10-CM

## 2020-08-24 RX ORDER — LOSARTAN POTASSIUM 50 MG/1
50 TABLET ORAL DAILY
Qty: 90 TABLET | Refills: 3 | Status: SHIPPED | OUTPATIENT
Start: 2020-08-24 | End: 2021-07-31

## 2020-09-15 ENCOUNTER — COMMUNICATION - HEALTHEAST (OUTPATIENT)
Dept: INTERNAL MEDICINE | Facility: CLINIC | Age: 75
End: 2020-09-15

## 2020-09-15 DIAGNOSIS — I10 ESSENTIAL HYPERTENSION: ICD-10-CM

## 2020-09-18 ENCOUNTER — COMMUNICATION - HEALTHEAST (OUTPATIENT)
Dept: INTERNAL MEDICINE | Facility: CLINIC | Age: 75
End: 2020-09-18

## 2020-09-18 ENCOUNTER — AMBULATORY - HEALTHEAST (OUTPATIENT)
Dept: LAB | Facility: CLINIC | Age: 75
End: 2020-09-18

## 2020-09-18 DIAGNOSIS — I10 ESSENTIAL HYPERTENSION: ICD-10-CM

## 2020-09-18 LAB
ANION GAP SERPL CALCULATED.3IONS-SCNC: 14 MMOL/L (ref 5–18)
BUN SERPL-MCNC: 15 MG/DL (ref 8–28)
CALCIUM SERPL-MCNC: 9.5 MG/DL (ref 8.5–10.5)
CHLORIDE BLD-SCNC: 93 MMOL/L (ref 98–107)
CO2 SERPL-SCNC: 24 MMOL/L (ref 22–31)
CREAT SERPL-MCNC: 1.2 MG/DL (ref 0.6–1.1)
GFR SERPL CREATININE-BSD FRML MDRD: 44 ML/MIN/1.73M2
GLUCOSE BLD-MCNC: 119 MG/DL (ref 70–125)
POTASSIUM BLD-SCNC: 4.6 MMOL/L (ref 3.5–5)
SODIUM SERPL-SCNC: 131 MMOL/L (ref 136–145)

## 2020-09-19 ENCOUNTER — COMMUNICATION - HEALTHEAST (OUTPATIENT)
Dept: INTERNAL MEDICINE | Facility: CLINIC | Age: 75
End: 2020-09-19

## 2020-09-19 ENCOUNTER — COMMUNICATION - HEALTHEAST (OUTPATIENT)
Dept: SCHEDULING | Facility: CLINIC | Age: 75
End: 2020-09-19

## 2020-09-19 DIAGNOSIS — E11.9 DM TYPE 2 (DIABETES MELLITUS, TYPE 2) (H): ICD-10-CM

## 2020-09-19 DIAGNOSIS — I10 HTN (HYPERTENSION): ICD-10-CM

## 2020-09-20 RX ORDER — INSULIN GLARGINE 100 [IU]/ML
INJECTION, SOLUTION SUBCUTANEOUS
Qty: 15 ML | Refills: 6 | Status: SHIPPED | OUTPATIENT
Start: 2020-09-20 | End: 2021-10-07

## 2020-10-27 ENCOUNTER — RECORDS - HEALTHEAST (OUTPATIENT)
Dept: ADMINISTRATIVE | Facility: OTHER | Age: 75
End: 2020-10-27

## 2020-10-27 LAB — RETINOPATHY: NEGATIVE

## 2020-11-01 ENCOUNTER — COMMUNICATION - HEALTHEAST (OUTPATIENT)
Dept: SCHEDULING | Facility: CLINIC | Age: 75
End: 2020-11-01

## 2020-11-01 DIAGNOSIS — E11.9 DM TYPE 2 (DIABETES MELLITUS, TYPE 2) (H): ICD-10-CM

## 2020-11-09 ENCOUNTER — RECORDS - HEALTHEAST (OUTPATIENT)
Dept: HEALTH INFORMATION MANAGEMENT | Facility: CLINIC | Age: 75
End: 2020-11-09

## 2020-12-31 ENCOUNTER — COMMUNICATION - HEALTHEAST (OUTPATIENT)
Dept: INTERNAL MEDICINE | Facility: CLINIC | Age: 75
End: 2020-12-31

## 2020-12-31 DIAGNOSIS — F32.1 MODERATE MAJOR DEPRESSION (H): ICD-10-CM

## 2021-01-02 RX ORDER — CITALOPRAM HYDROBROMIDE 20 MG/1
TABLET ORAL
Qty: 90 TABLET | Refills: 2 | Status: SHIPPED | OUTPATIENT
Start: 2021-01-02 | End: 2021-12-16

## 2021-01-28 ENCOUNTER — COMMUNICATION - HEALTHEAST (OUTPATIENT)
Dept: INTERNAL MEDICINE | Facility: CLINIC | Age: 76
End: 2021-01-28

## 2021-01-28 DIAGNOSIS — E83.42 HYPOMAGNESEMIA: ICD-10-CM

## 2021-01-28 RX ORDER — MAGNESIUM OXIDE 400 MG/1
1 TABLET ORAL 2 TIMES DAILY
Qty: 180 TABLET | Refills: 3 | Status: SHIPPED | OUTPATIENT
Start: 2021-01-28

## 2021-02-23 ENCOUNTER — COMMUNICATION - HEALTHEAST (OUTPATIENT)
Dept: INTERNAL MEDICINE | Facility: CLINIC | Age: 76
End: 2021-02-23

## 2021-02-23 ENCOUNTER — OFFICE VISIT - HEALTHEAST (OUTPATIENT)
Dept: INTERNAL MEDICINE | Facility: CLINIC | Age: 76
End: 2021-02-23

## 2021-02-23 DIAGNOSIS — E11.42 TYPE 2 DIABETES MELLITUS WITH DIABETIC POLYNEUROPATHY, WITH LONG-TERM CURRENT USE OF INSULIN (H): ICD-10-CM

## 2021-02-23 DIAGNOSIS — Z79.4 TYPE 2 DIABETES MELLITUS WITH DIABETIC POLYNEUROPATHY, WITH LONG-TERM CURRENT USE OF INSULIN (H): ICD-10-CM

## 2021-02-23 DIAGNOSIS — I10 ESSENTIAL HYPERTENSION: ICD-10-CM

## 2021-02-23 DIAGNOSIS — F32.1 MODERATE MAJOR DEPRESSION (H): ICD-10-CM

## 2021-02-23 DIAGNOSIS — G63 POLYNEUROPATHY ASSOCIATED WITH UNDERLYING DISEASE (H): ICD-10-CM

## 2021-02-23 DIAGNOSIS — N18.31 STAGE 3A CHRONIC KIDNEY DISEASE (H): ICD-10-CM

## 2021-02-23 DIAGNOSIS — E78.2 MIXED HYPERLIPIDEMIA: ICD-10-CM

## 2021-02-23 DIAGNOSIS — Z79.899 ON ANGIOTENSIN RECEPTOR BLOCKERS (ARB): ICD-10-CM

## 2021-02-23 DIAGNOSIS — M54.16 LUMBAR RADICULAR PAIN: ICD-10-CM

## 2021-02-23 LAB
ANION GAP SERPL CALCULATED.3IONS-SCNC: 9 MMOL/L (ref 5–18)
ATRIAL RATE - MUSE: 73 BPM
BUN SERPL-MCNC: 14 MG/DL (ref 8–28)
CALCIUM SERPL-MCNC: 9.3 MG/DL (ref 8.5–10.5)
CHLORIDE BLD-SCNC: 95 MMOL/L (ref 98–107)
CO2 SERPL-SCNC: 28 MMOL/L (ref 22–31)
CREAT SERPL-MCNC: 1.03 MG/DL (ref 0.6–1.1)
DIASTOLIC BLOOD PRESSURE - MUSE: NORMAL
GFR SERPL CREATININE-BSD FRML MDRD: 52 ML/MIN/1.73M2
GLUCOSE BLD-MCNC: 117 MG/DL (ref 70–125)
HBA1C MFR BLD: 6.7 %
INTERPRETATION ECG - MUSE: NORMAL
P AXIS - MUSE: 57 DEGREES
POTASSIUM BLD-SCNC: 4.9 MMOL/L (ref 3.5–5)
PR INTERVAL - MUSE: 146 MS
QRS DURATION - MUSE: 76 MS
QT - MUSE: 388 MS
QTC - MUSE: 427 MS
R AXIS - MUSE: 61 DEGREES
SODIUM SERPL-SCNC: 132 MMOL/L (ref 136–145)
SYSTOLIC BLOOD PRESSURE - MUSE: NORMAL
T AXIS - MUSE: 71 DEGREES
VENTRICULAR RATE- MUSE: 73 BPM

## 2021-02-23 RX ORDER — GLUCOSAMINE HCL/CHONDROITIN SU 500-400 MG
1 CAPSULE ORAL 2 TIMES DAILY
Qty: 200 STRIP | Refills: 3 | Status: SHIPPED | OUTPATIENT
Start: 2021-02-23 | End: 2022-07-31

## 2021-02-23 RX ORDER — METOPROLOL SUCCINATE 50 MG/1
50 TABLET, EXTENDED RELEASE ORAL DAILY
Qty: 90 TABLET | Refills: 3 | Status: SHIPPED | OUTPATIENT
Start: 2021-02-23 | End: 2022-05-04

## 2021-02-23 RX ORDER — GABAPENTIN 300 MG/1
CAPSULE ORAL
Qty: 270 CAPSULE | Refills: 3 | Status: SHIPPED | OUTPATIENT
Start: 2021-02-23 | End: 2022-04-29

## 2021-02-23 RX ORDER — ATORVASTATIN CALCIUM 40 MG/1
TABLET, FILM COATED ORAL
Qty: 90 TABLET | Refills: 3 | Status: SHIPPED | OUTPATIENT
Start: 2021-02-23 | End: 2022-02-03

## 2021-02-23 ASSESSMENT — MIFFLIN-ST. JEOR: SCORE: 1368.13

## 2021-04-22 ENCOUNTER — COMMUNICATION - HEALTHEAST (OUTPATIENT)
Dept: INTERNAL MEDICINE | Facility: CLINIC | Age: 76
End: 2021-04-22

## 2021-04-22 DIAGNOSIS — I10 HTN (HYPERTENSION): ICD-10-CM

## 2021-04-22 RX ORDER — FUROSEMIDE 40 MG
40 TABLET ORAL DAILY
Qty: 90 TABLET | Refills: 1 | Status: SHIPPED | OUTPATIENT
Start: 2021-04-22 | End: 2021-10-22

## 2021-05-26 ENCOUNTER — RECORDS - HEALTHEAST (OUTPATIENT)
Dept: ADMINISTRATIVE | Facility: CLINIC | Age: 76
End: 2021-05-26

## 2021-05-27 ENCOUNTER — RECORDS - HEALTHEAST (OUTPATIENT)
Dept: ADMINISTRATIVE | Facility: CLINIC | Age: 76
End: 2021-05-27

## 2021-05-27 VITALS — SYSTOLIC BLOOD PRESSURE: 134 MMHG | DIASTOLIC BLOOD PRESSURE: 78 MMHG | OXYGEN SATURATION: 97 % | HEART RATE: 74 BPM

## 2021-05-27 NOTE — TELEPHONE ENCOUNTER
Refill Approved    Rx renewed per Medication Renewal Policy. Medication was last renewed on 10/20/18. 10/25/18. 7/21/18. 10/20/18    Makenzie Freitas, [facility] Triage/Med Refill 4/18/2019     Requested Prescriptions   Pending Prescriptions Disp Refills     atorvastatin (LIPITOR) 40 MG tablet 90 tablet 1       Statins Refill Protocol (Hmg CoA Reductase Inhibitors) Passed - 4/16/2019  3:48 PM        Passed - PCP or prescribing provider visit in past 12 months      Last office visit with prescriber/PCP: 1/14/2019 Jonathan Perez MD OR same dept: 1/14/2019 Jonathan Perez MD OR same specialty: 1/14/2019 Jonathan Perez MD  Last physical: Visit date not found Last MTM visit: Visit date not found   Next visit within 3 mo: Visit date not found  Next physical within 3 mo: Visit date not found  Prescriber OR PCP: Jonathan Perez MD  Last diagnosis associated with med order: 1. Hyperlipidemia  - atorvastatin (LIPITOR) 40 MG tablet  Dispense: 90 tablet; Refill: 1    2. HTN (hypertension)  - furosemide (LASIX) 40 MG tablet; Take 1 tablet (40 mg total) by mouth daily.  Dispense: 30 tablet; Refill: 0  - lisinopril (PRINIVIL,ZESTRIL) 2.5 MG tablet; Take 1 tablet (2.5 mg total) by mouth daily.  Dispense: 90 tablet; Refill: 1    3. Lumbar radicular pain  - gabapentin (NEURONTIN) 300 MG capsule; TAKE 1 CAPSULE(300 MG) BY MOUTH THREE TIMES DAILY  Dispense: 270 capsule; Refill: 2    If protocol passes may refill for 12 months if within 3 months of last provider visit (or a total of 15 months).             furosemide (LASIX) 40 MG tablet 30 tablet 0     Sig: Take 1 tablet (40 mg total) by mouth daily.       Diuretics/Combination Diuretics Refill Protocol  Passed - 4/16/2019  3:48 PM        Passed - Visit with PCP or prescribing provider visit in past 12 months     Last office visit with prescriber/PCP: 1/14/2019 Jonathan Perez MD OR same dept: 1/14/2019 Jonathan Perez MD OR same specialty: 1/14/2019 Jonathan Perez MD   Last physical: Visit date not found Last MTM visit: Visit date not found   Next visit within 3 mo: Visit date not found  Next physical within 3 mo: Visit date not found  Prescriber OR PCP: Jonathan Perez MD  Last diagnosis associated with med order: 1. Hyperlipidemia  - atorvastatin (LIPITOR) 40 MG tablet  Dispense: 90 tablet; Refill: 1    2. HTN (hypertension)  - furosemide (LASIX) 40 MG tablet; Take 1 tablet (40 mg total) by mouth daily.  Dispense: 30 tablet; Refill: 0  - lisinopril (PRINIVIL,ZESTRIL) 2.5 MG tablet; Take 1 tablet (2.5 mg total) by mouth daily.  Dispense: 90 tablet; Refill: 1    3. Lumbar radicular pain  - gabapentin (NEURONTIN) 300 MG capsule; TAKE 1 CAPSULE(300 MG) BY MOUTH THREE TIMES DAILY  Dispense: 270 capsule; Refill: 2    If protocol passes may refill for 12 months if within 3 months of last provider visit (or a total of 15 months).             Passed - Serum Potassium in past 12 months      Lab Results   Component Value Date    Potassium 5.0 01/14/2019             Passed - Serum Sodium in past 12 months      Lab Results   Component Value Date    Sodium 132 (L) 01/14/2019             Passed - Blood pressure on file in past 12 months     BP Readings from Last 1 Encounters:   01/14/19 134/78             Passed - Serum Creatinine in past 12 months      Creatinine   Date Value Ref Range Status   01/14/2019 1.13 (H) 0.60 - 1.10 mg/dL Final             gabapentin (NEURONTIN) 300 MG capsule 270 capsule 2     Sig: TAKE 1 CAPSULE(300 MG) BY MOUTH THREE TIMES DAILY       Gabapentin/Levetiracetam/Tiagabine Refill Protocol  Passed - 4/16/2019  3:48 PM        Passed - PCP or prescribing provider visit in past 12 months or next 3 months     Last office visit with prescriber/PCP: 1/14/2019 Jonathan Perez MD OR same dept: 1/14/2019 Jonathan Perez MD OR same specialty: 1/14/2019 Jonathan Perez MD  Last physical: Visit date not found Last MTM visit: Visit date not found   Next visit within 3 mo:  Visit date not found  Next physical within 3 mo: Visit date not found  Prescriber OR PCP: Jonathan Perez MD  Last diagnosis associated with med order: 1. Hyperlipidemia  - atorvastatin (LIPITOR) 40 MG tablet  Dispense: 90 tablet; Refill: 1    2. HTN (hypertension)  - furosemide (LASIX) 40 MG tablet; Take 1 tablet (40 mg total) by mouth daily.  Dispense: 30 tablet; Refill: 0  - lisinopril (PRINIVIL,ZESTRIL) 2.5 MG tablet; Take 1 tablet (2.5 mg total) by mouth daily.  Dispense: 90 tablet; Refill: 1    3. Lumbar radicular pain  - gabapentin (NEURONTIN) 300 MG capsule; TAKE 1 CAPSULE(300 MG) BY MOUTH THREE TIMES DAILY  Dispense: 270 capsule; Refill: 2    If protocol passes may refill for 12 months if within 3 months of last provider visit (or a total of 15 months).             lisinopril (PRINIVIL,ZESTRIL) 2.5 MG tablet 90 tablet 1     Sig: Take 1 tablet (2.5 mg total) by mouth daily.       Ace Inhibitors Refill Protocol Passed - 4/16/2019  3:48 PM        Passed - PCP or prescribing provider visit in past 12 months       Last office visit with prescriber/PCP: 1/14/2019 Jonathan Perez MD OR same dept: 1/14/2019 Jonathan Perez MD OR same specialty: 1/14/2019 Jonathan Perez MD  Last physical: Visit date not found Last MTM visit: Visit date not found   Next visit within 3 mo: Visit date not found  Next physical within 3 mo: Visit date not found  Prescriber OR PCP: Jonathan Perez MD  Last diagnosis associated with med order: 1. Hyperlipidemia  - atorvastatin (LIPITOR) 40 MG tablet  Dispense: 90 tablet; Refill: 1    2. HTN (hypertension)  - furosemide (LASIX) 40 MG tablet; Take 1 tablet (40 mg total) by mouth daily.  Dispense: 30 tablet; Refill: 0  - lisinopril (PRINIVIL,ZESTRIL) 2.5 MG tablet; Take 1 tablet (2.5 mg total) by mouth daily.  Dispense: 90 tablet; Refill: 1    3. Lumbar radicular pain  - gabapentin (NEURONTIN) 300 MG capsule; TAKE 1 CAPSULE(300 MG) BY MOUTH THREE TIMES DAILY  Dispense: 270 capsule;  Refill: 2    If protocol passes may refill for 12 months if within 3 months of last provider visit (or a total of 15 months).             Passed - Serum Potassium in past 12 months     Lab Results   Component Value Date    Potassium 5.0 01/14/2019             Passed - Blood pressure filed in past 12 months     BP Readings from Last 1 Encounters:   01/14/19 134/78             Passed - Serum Creatinine in past 12 months     Creatinine   Date Value Ref Range Status   01/14/2019 1.13 (H) 0.60 - 1.10 mg/dL Final

## 2021-05-28 ENCOUNTER — RECORDS - HEALTHEAST (OUTPATIENT)
Dept: ADMINISTRATIVE | Facility: CLINIC | Age: 76
End: 2021-05-28

## 2021-05-29 ENCOUNTER — RECORDS - HEALTHEAST (OUTPATIENT)
Dept: ADMINISTRATIVE | Facility: CLINIC | Age: 76
End: 2021-05-29

## 2021-05-30 ENCOUNTER — RECORDS - HEALTHEAST (OUTPATIENT)
Dept: ADMINISTRATIVE | Facility: CLINIC | Age: 76
End: 2021-05-30

## 2021-05-30 VITALS — WEIGHT: 210.2 LBS | BODY MASS INDEX: 33.17 KG/M2

## 2021-05-30 NOTE — TELEPHONE ENCOUNTER
Medication Request  Medication name: One Touch Verio Test Strips   Pharmacy Name and Location: [facility] #[id number]  Reason for request: Fax stated patient is requesting for a new Rx.  When did you use medication last?:  n/a  Patient offered appointment:  n/a  Okay to leave a detailed message: no

## 2021-05-30 NOTE — TELEPHONE ENCOUNTER
RN cannot approve Refill Request    RN can NOT refill this medication Protocol failed and NO refill given.     Makenzie Freitas, [facility] Triage/Med Refill 7/23/2019    Requested Prescriptions   Pending Prescriptions Disp Refills     metFORMIN (GLUCOPHAGE-XR) 500 MG 24 hr tablet [Pharmacy Med Name: METFORMIN HCL  MG TABLET] 360 tablet 0     Sig: TAKE 4 TABLETS BY MOUTH DAILY       Metformin Refill Protocol Failed - 7/23/2019  7:32 AM        Failed - Microalbumin in last year      Microalbumin, Random Urine   Date Value Ref Range Status   08/30/2016 0.85 0.00 - 1.99 mg/dL Final                  Passed - Blood pressure in last 12 months     BP Readings from Last 1 Encounters:   05/13/19 136/80             Passed - LFT or AST or ALT in last 12 months     Albumin   Date Value Ref Range Status   01/14/2019 4.1 3.5 - 5.0 g/dL Final     Bilirubin, Total   Date Value Ref Range Status   01/14/2019 0.3 0.0 - 1.0 mg/dL Final     Bilirubin, Direct   Date Value Ref Range Status   11/25/2013 0.07 <0.31 mg/dL Final     Alkaline Phosphatase   Date Value Ref Range Status   01/14/2019 81 45 - 120 U/L Final     AST   Date Value Ref Range Status   01/14/2019 14 0 - 40 U/L Final     ALT   Date Value Ref Range Status   01/14/2019 12 0 - 45 U/L Final     Protein, Total   Date Value Ref Range Status   01/14/2019 7.0 6.0 - 8.0 g/dL Final                Passed - GFR or Serum Creatinine in last 6 months     GFR MDRD Non Af Amer   Date Value Ref Range Status   05/13/2019 49 (L) >60 mL/min/1.73m2 Final     GFR MDRD Af Amer   Date Value Ref Range Status   05/13/2019 59 (L) >60 mL/min/1.73m2 Final             Passed - Visit with PCP or prescribing provider visit in last 6 months or next 3 months     Last office visit with prescriber/PCP: 5/13/2019 OR same dept: 5/13/2019 Jonathan Perez MD OR same specialty: 5/13/2019 Jonathan Perez MD Last physical: Visit date not found Last MTM visit: Visit date not found         Next appt within  3 mo: Visit date not found  Next physical within 3 mo: Visit date not found  Prescriber OR PCP: Jonathan Perez MD  Last diagnosis associated with med order: There are no diagnoses linked to this encounter.   If protocol passes may refill for 12 months if within 3 months of last provider visit (or a total of 15 months).           Passed - A1C in last 6 months     Hemoglobin A1c   Date Value Ref Range Status   05/13/2019 6.9 (H) 3.5 - 6.0 % Final

## 2021-05-31 ENCOUNTER — COMMUNICATION - HEALTHEAST (OUTPATIENT)
Dept: INTERNAL MEDICINE | Facility: CLINIC | Age: 76
End: 2021-05-31

## 2021-05-31 ENCOUNTER — RECORDS - HEALTHEAST (OUTPATIENT)
Dept: ADMINISTRATIVE | Facility: CLINIC | Age: 76
End: 2021-05-31

## 2021-05-31 VITALS — BODY MASS INDEX: 32.82 KG/M2 | WEIGHT: 208 LBS

## 2021-05-31 NOTE — TELEPHONE ENCOUNTER
RN cannot approve Refill Request    RN can NOT refill this medication Protocol failed and NO refill given.         Makenzie Freitas, [facility] Triage/Med Refill 8/23/2019    Requested Prescriptions   Pending Prescriptions Disp Refills     LANTUS SOLOSTAR U-100 INSULIN 100 unit/mL (3 mL) pen [Pharmacy Med Name: LANTUS SOLOSTAR 100 UNIT/ML]  2     Sig: INJECT 20 UNITS SUBCUTANEOUSLY AT BEDTIME. MAY TAKE 16-20 UNITS AS DIRECTED       Insulin/GLP-1 Refill Protocol Failed - 8/23/2019  1:35 AM        Failed - Microalbumin in last year     Microalbumin, Random Urine   Date Value Ref Range Status   08/30/2016 0.85 0.00 - 1.99 mg/dL Final                  Passed - Visit with PCP or prescribing provider visit in last 6 months     Last office visit with prescriber/PCP: 5/13/2019 OR same dept: 5/13/2019 Jonathan Perez MD OR same specialty: 5/13/2019 Jonathan Perez MD Last physical: Visit date not found Last MTM visit: Visit date not found     Next appt within 3 mo: Visit date not found  Next physical within 3 mo: Visit date not found  Prescriber OR PCP: Jonathan Perez MD  Last diagnosis associated with med order: 1. DM type 2 (diabetes mellitus, type 2) (H)  - LANTUS SOLOSTAR U-100 INSULIN 100 unit/mL (3 mL) pen [Pharmacy Med Name: LANTUS SOLOSTAR 100 UNIT/ML]; INJECT 20 UNITS SUBCUTANEOUSLY AT BEDTIME. MAY TAKE 16-20 UNITS AS DIRECTED; Refill: 2    If protocol passes may refill for 6 months if within 3 months of last provider visit (or a total of 9 months).              Passed - A1C in last 6 months     Hemoglobin A1c   Date Value Ref Range Status   05/13/2019 6.9 (H) 3.5 - 6.0 % Final               Passed - Blood pressure in last year     BP Readings from Last 1 Encounters:   05/13/19 136/80             Passed - Creatinine done in last year     Creatinine   Date Value Ref Range Status   05/13/2019 1.10 0.60 - 1.10 mg/dL Final

## 2021-06-01 ENCOUNTER — RECORDS - HEALTHEAST (OUTPATIENT)
Dept: ADMINISTRATIVE | Facility: CLINIC | Age: 76
End: 2021-06-01

## 2021-06-01 VITALS — WEIGHT: 204.4 LBS | BODY MASS INDEX: 32.25 KG/M2

## 2021-06-02 ENCOUNTER — RECORDS - HEALTHEAST (OUTPATIENT)
Dept: ADMINISTRATIVE | Facility: CLINIC | Age: 76
End: 2021-06-02

## 2021-06-02 ENCOUNTER — COMMUNICATION - HEALTHEAST (OUTPATIENT)
Dept: INTERNAL MEDICINE | Facility: CLINIC | Age: 76
End: 2021-06-02

## 2021-06-02 VITALS — WEIGHT: 196.2 LBS | HEIGHT: 67 IN | BODY MASS INDEX: 30.79 KG/M2

## 2021-06-02 DIAGNOSIS — N39.46 MIXED INCONTINENCE: ICD-10-CM

## 2021-06-03 VITALS — BODY MASS INDEX: 30.53 KG/M2 | WEIGHT: 192 LBS

## 2021-06-04 VITALS
HEIGHT: 66 IN | HEART RATE: 79 BPM | DIASTOLIC BLOOD PRESSURE: 70 MMHG | SYSTOLIC BLOOD PRESSURE: 136 MMHG | OXYGEN SATURATION: 97 % | WEIGHT: 193.4 LBS | BODY MASS INDEX: 31.08 KG/M2

## 2021-06-04 VITALS — DIASTOLIC BLOOD PRESSURE: 74 MMHG | SYSTOLIC BLOOD PRESSURE: 136 MMHG | HEART RATE: 71 BPM

## 2021-06-05 VITALS
HEIGHT: 66 IN | HEART RATE: 76 BPM | DIASTOLIC BLOOD PRESSURE: 76 MMHG | BODY MASS INDEX: 30.52 KG/M2 | WEIGHT: 189.9 LBS | OXYGEN SATURATION: 98 % | SYSTOLIC BLOOD PRESSURE: 136 MMHG

## 2021-06-05 NOTE — TELEPHONE ENCOUNTER
Please call patient -    ______________________________________________________________________     Home phone:  [phone] (home)     Cell phone:   Telephone Information:   Mobile [phone]       Other contacts:  Name Home Phone Work Phone Mobile Phone Relationship Lgl Grd   STACY ZAPATA [phone] Child    JAY ZAPATA   [phone] Child      ______________________________________________________________________     Her sodium level is too low.  Her potassium level is too high.    Your hemoglobin A1C is in the goal range - your diabetes is doing well at this time.      The lisinopril is likely causing the high potassium and I recommend stopping this at this time.    The citalopram (Celexa) is likely causing the low sodium.  She should cut this pill in half and only take 1/2 a day.  Could send in a lower dose if she would rather.    I would like her to make these changes and follow up in 3 weeks.  If the sodium goes lower, it can cause problems.    Thank you,    Jonathan Perez MD  [facility]  2/3/2020, 9:21 PM     ______________________________________________________________________    Recent Results (from the past 240 hour(s))   Glycosylated Hemoglobin A1c   Result Value Ref Range    Hemoglobin A1c 6.3 (H) 3.5 - 6.0 %   Basic Metabolic Panel   Result Value Ref Range    Sodium 124 (L) 136 - 145 mmol/L    Potassium 5.5 (H) 3.5 - 5.0 mmol/L    Chloride 88 (L) 98 - 107 mmol/L    CO2 23 22 - 31 mmol/L    Anion Gap, Calculation 13 5 - 18 mmol/L    Glucose 107 70 - 125 mg/dL    Calcium 9.7 8.5 - 10.5 mg/dL    BUN 10 8 - 28 mg/dL    Creatinine 1.04 0.60 - 1.10 mg/dL    GFR MDRD Af Amer >60 >60 mL/min/1.73m2    GFR MDRD Non Af Amer 52 (L) >60 mL/min/1.73m2   Cortisol   Result Value Ref Range    Cortisol 10.1 ug/dL       ______________________________________________________________________

## 2021-06-05 NOTE — PROGRESS NOTES
______________________________________________________________________    Review of HCC items was performed at this visit.    Encounter Diagnoses   Name Primary?     Type 2 diabetes mellitus with diabetic polyneuropathy, with long-term current use of insulin (H) Currently stable and continue current medications.     Polyneuropathy associated with underlying disease (H) Not worsening.     Moderate major depression (H) No major changes.     CKD (chronic kidney disease) stage 3, GFR 30-59 ml/min (H) Will check on today.        ______________________________________________________________________

## 2021-06-05 NOTE — TELEPHONE ENCOUNTER
Please call patient -    ______________________________________________________________________     Home phone:  [phone] (home)     Cell phone:   Telephone Information:   Mobile [phone]       Other contacts:  Name Home Phone Work Phone Mobile Phone Relationship Lgl Grd   STACY ZAPATA [phone] Child    JAY ZAPATA   [phone] Child      ______________________________________________________________________     I did refills of her medications but she requires a follow up of her medical issues.    Please schedule her in January or February for follow up.  She needs to follow up to keep her medications active.    Jonathan Perez MD  [facility]  1/8/2020, 11:04 AM

## 2021-06-05 NOTE — PATIENT INSTRUCTIONS - HE
Please follow up if you have any further issues.    You may contact me by phone or [facility] if you are worsening or if things are not improving.    Thank you for coming in today.

## 2021-06-05 NOTE — TELEPHONE ENCOUNTER
Informed pt of Dr. Perez's message.    She states an understanding and will try cutting her citalopram in half.    Pt scheduled for 2/21/20 at 10:05.    She thanked for the call.

## 2021-06-05 NOTE — TELEPHONE ENCOUNTER
Called and informed pt that the Rx was sent - pt states that she just has a hard time getting out in the winter.  Pt was scheduled for OV on 01/31/20 with PCP as requested

## 2021-06-06 NOTE — TELEPHONE ENCOUNTER
Who is calling:  Patient   Reason for Call:  Patient states Jonathan Santiago Referred her to Podiatry he is going to give her Podiatry name and phone number but he did not give patient is calling for Podiatry name and phone number .   Date of last appointment with primary care: 02/21/20  Okay to leave a detailed message: No

## 2021-06-06 NOTE — TELEPHONE ENCOUNTER
Talked to Rhea and provided her with [facility] number [phone].  She thanked for the call.

## 2021-06-06 NOTE — TELEPHONE ENCOUNTER
Please call patient -    ______________________________________________________________________     Home phone:  [phone] (home)     Cell phone:   Telephone Information:   Mobile [phone]       Other contacts:  Name Home Phone Work Phone Mobile Phone Relationship Lgl Grd   STACY ZAPATA [phone] Child    JAY ZAPATA   [phone] Child      ______________________________________________________________________     Her sodium is better and the other tests were good overall.    Her potassium is better as well.    Continue current medications as is.    Follow up again in 6 months.    Jonathan Perez MD  [facility]  2/25/2020, 7:58 AM     ______________________________________________________________________    Recent Results (from the past 240 hour(s))   Basic Metabolic Panel   Result Value Ref Range    Sodium 129 (L) 136 - 145 mmol/L    Potassium 4.5 3.5 - 5.0 mmol/L    Chloride 93 (L) 98 - 107 mmol/L    CO2 23 22 - 31 mmol/L    Anion Gap, Calculation 13 5 - 18 mmol/L    Glucose 100 70 - 125 mg/dL    Calcium 9.3 8.5 - 10.5 mg/dL    BUN 14 8 - 28 mg/dL    Creatinine 1.09 0.60 - 1.10 mg/dL    GFR MDRD Af Amer 59 (L) >60 mL/min/1.73m2    GFR MDRD Non Af Amer 49 (L) >60 mL/min/1.73m2   Osmolality   Result Value Ref Range    Osmolality, Blood 268 (L) 270 - 300 mOsm/kg   Cortisol   Result Value Ref Range    Cortisol 11.9 ug/dL   Microalbumin, Random Urine   Result Value Ref Range    Microalbumin, Random Urine 7.20 (H) 0.00 - 1.99 mg/dL    Creatinine, Urine 35.1 mg/dL    Microalbumin/Creatinine Ratio Random Urine 205.1 (H) <=19.9 mg/g   Osmolality, Random, Urine   Result Value Ref Range    Osmolality, Urine 176 (L) 300 - 900 mOsm/kg   Sodium, Random Urine   Result Value Ref Range    Sodium, Urine <20 mmol/L       ______________________________________________________________________

## 2021-06-06 NOTE — TELEPHONE ENCOUNTER
Informed pt of Dr. Perez's message.    She states an understanding.  Pt will call back to schedule appointment.     Letter of lab results mailed per request.    Pt thanked for the call.

## 2021-06-06 NOTE — PATIENT INSTRUCTIONS - HE
I have recommended that you be seen by [facility].    Their phone number is [phone].    They are located at the following locations:    [place] Office  [facility] [facility] [address] [place] [address]    Dr. Asia Foster    [place] Office  [address] [place] [address]    Dr. Og Garcia

## 2021-06-07 NOTE — TELEPHONE ENCOUNTER
Refill Request  Did you contact pharmacy: Yes.  Date: patient states she called the pharmacy this morning.   Medication name:   Requested Prescriptions     Pending Prescriptions Disp Refills     citalopram (CELEXA) 40 MG tablet 90 tablet 3     Sig: Take 0.5 tablets (20 mg total) by mouth daily.     Who prescribed the medication: Jonathan Perez MD   Requested Pharmacy: [facility]  Is patient out of medication: No.  3 days left  Patient notified refills processed in 3 business days:  yes  Okay to leave a detailed message: yes

## 2021-06-09 NOTE — TELEPHONE ENCOUNTER
Left message to call back.    If pt calls back please inform pt of Dr. Perez's message and assist with scheduling an appointment.

## 2021-06-09 NOTE — PROGRESS NOTES
Wt Readings from Last 20 Encounters:   02/24/17 210 lb 3.2 oz (95.3 kg)   08/30/16 213 lb (96.6 kg)   02/18/16 210 lb (95.3 kg)   11/02/15 207 lb (93.9 kg)   07/20/15 209 lb 9.6 oz (95.1 kg)   06/11/15 210 lb (95.3 kg)   05/26/15 208 lb (94.3 kg)   05/21/15 211 lb (95.7 kg)   05/12/15 208 lb (94.3 kg)   04/13/15 217 lb (98.4 kg)   03/30/15 201 lb (91.2 kg)

## 2021-06-09 NOTE — PROGRESS NOTES
This Maintenance Wellness Plan provides private information in regards to the work I have done with my Care Team from my Primary Care Clinic.  This document provides insight on the goals I have worked hard to achieve.  My Care Team congratulates me on my journey to become well.  With the assistance of my Clinic Care Guide and Primary Care Physician,  I have succeeded in improving areas of my health that I identified as barriers to becoming well.  I will continue to seek wellness and use the skills I have obtained to further my journey.  My Care Guide will follow up with me in 6 months.  In the meantime, if I should have any questions or concerns I will contact my Care Guide.     My Clinic Care Coordination Wellness Plan    [address]  [address]  [phone]    My Preferred Method of Contact:  Phone: [phone]    My Primary/Preferred Language:  English    Preferred Learning Style:  Face to face discussion    Emergency Contact: Extended Emergency Contact Information  Primary Emergency Contact: Garrett Jasmine   [facility]  Home Phone: [phone]  Mobile Phone: [phone]  Relation: Child  Secondary Emergency Contact: Elia Jasmine   [place]  Mobile Phone: [phone]  Relation: Child     PCP:  Jonathan Perez MD  Specialists:    Care Team            Jonathan Perez MD PCP - General, Internal Medicine    [phone]     Edu Birch MD Physician, Nephrology    [phone]     [facility] Care Coordination Care Guide, Clinic Care Coordination    #: [phone]; Fax#: [phone]        Accomplishments:  Goals       COMPLETED: I would like to exercise more.  (pt-stated)            Action steps to achieve this goal    1).  I will remain active at home by doing things throughout the day (2/22/2017)    Updated:2/22/2017  Date goal set:  Oct. 9, 2013        COMPLETED: I would like to improve my eating habits.   (pt-stated)            Action steps to achieve this goal    1).  I will work on eating 2 meals a day and have a snack in between meals. (continuous goal)   She will contact me if she is interested in having a nurse visit for support.     Updated: 2/22/2017  Date goal set:  Oct. 13, 2013          Advanced Directive/Living Will: On file with the clinic    Clinical Emergency Plan    All [facility] clinic patients have access to a Nurse 24 hours a day, 7 days a week.  If you have questions or want advice from a Nurse, please know [facility] is here for you.  You can call your clinic and they will connect you or you can call [facility] Connection at [phone].  [facility] also has Walk In Care clinics in multiple locations.  Call the number listed above for more information about our Walk In Care clinics or visit the [facility] website at [email or web address].    High Blood Pressure (Hypertension)  You have been diagnosed with high blood pressure (also called hypertension). This means the force of blood against your artery walls is too strong. It also means your heart is working hard to move blood. High blood pressure usually has no symptoms, but over time, it can damage your heart, blood vessels, eyes, kidneys, and other organs. With help from your doctor, you can manage your blood pressure and protect your health.  Taking medications    Learn to take your own blood pressure. Keep a record of your results. Ask your doctor which readings mean that you need medical attention.    Take your blood pressure medication exactly as directed. Don t skip doses. Missing doses can cause your blood pressure to get out of control.    Avoid medications that contain heart stimulants, including over-the-counter drugs. Check for warnings about high blood pressure on the label.    Check with your doctor before taking a decongestant. Some decongestants can worsen high blood pressure.  Lifestyle changes    Maintain a healthy weight. Get help to  lose any extra pounds.    Cut back on salt.    Limit canned, dried, packaged, and fast foods.    Don t add salt to your food at the table.    Season foods with herbs instead of salt when you cook.    Follow the DASH (Dietary Approaches to Stop Hypertension) eating plan. This plan recommends vegetables, fruits, whole gains, and other heart healthy foods.    Begin an exercise program. Ask your doctor how to get started. The American Heart Association recommends aerobic exercise 3 to 4 times a week for an average of 40 minutes at a time, with your doctor's approval. Simple activities like walking or gardening can help.    Break the smoking habit. Enroll in a stop-smoking program to improve your chances of success. Ask your health care provider about programs and medications to help you stop smoking.    Limit drinks that contain caffeine (coffee, black or green tea, cola) to 2 per day.    Never take stimulants such as amphetamines or cocaine; these drugs can be deadly for someone with high blood pressure.    Control your stress. Learn stress-management techniques.    Limit alcohol to no more than 1 drink a day for women and 2 drinks a day for men.  Follow-up care  Make a follow-up appointment as directed by our staff.     When to seek medical care  Call your doctor immediately if you have any of the following:    Chest pain or shortness of breath (call 911)    Moderate to severe headache    Weakness in the muscles of your face, arms, or legs    Trouble speaking    Extreme drowsiness    Confusion    Fainting or dizziness    Pulsating or rushing sound in your ears    Unexplained nosebleed    Weakness, tingling, or numbness of your face, arms, or legs    Change in vision    Blood pressure measured at home that is greater than 180/110         Chronic Kidney Disease (CKD)  Chronic kidney disease can result from many causes including infections, diabetes, high blood pressure, kidney stones, circulation problems, and reactions  to medication. Having kidney disease means making many changes in your life. Learn as much as you can about it so that you can better adjust to these changes. It is important to remember that the main goal of treatment is to stop chronic kidney disease (CKD) from progressing to complete kidney failure. Treatments may vary based on the progression of CKD, so always follow your doctor's instructions in the care and management of your condition.  When to seek medical care  Call your doctor right away if you have any of the following:    Trouble eating or drinking    Weight loss of more than 2 pounds in 24 hours or more than 5 pounds in 7 days    Little or no urine output    Trouble breathing    Muscle aches    Fever of 100.4 F or higher, or chills    Blood in your urine or stool    Bloody discharge from your nose, mouth, or ears    Severe headache or a seizure    Vomiting    Swelling of legs or ankles    Chest pain (call 911)        Diabetes: Sick-Day Plan  Infections, the flu, and even a cold, can cause your blood sugar to rise. And, eating less, nausea, and vomiting may cause your blood glucose to fall (hypoglycemia). Ask your health care provider to help you develop a sick-day plan. The following information can help.     Call your health care provider if:    You vomit or have diarrhea for more than 6 hours.    Your blood glucose level is higher than usual or over 250 mg/dL after you have taken extra insulin (if recommended in your sick-day plan).    You take oral medicine for diabetes, and your blood sugar is higher than usual or over 250 mg/dL, before a meal and stays that high for more than 24 hours.    Your blood glucose is lower than usual or less than 70 mg/dL    You have moderate to large amounts of ketones in your blood or urine.    You aren t better after 2 days.

## 2021-06-09 NOTE — TELEPHONE ENCOUNTER
Refill done.    Please schedule the patient for follow-up in late August.  This can be a face-to-face for a virtual visit depending on patient preference.    Thank you,    Jonathan Perez MD  General Internal Medicine  [facility]  6/29/2020, 3:52 PM

## 2021-06-09 NOTE — PROGRESS NOTES
Wellness plan:    High Blood Pressure (Hypertension)  You have been diagnosed with high blood pressure (also called hypertension). This means the force of blood against your artery walls is too strong. It also means your heart is working hard to move blood. High blood pressure usually has no symptoms, but over time, it can damage your heart, blood vessels, eyes, kidneys, and other organs. With help from your doctor, you can manage your blood pressure and protect your health.  Taking medications    Learn to take your own blood pressure. Keep a record of your results. Ask your doctor which readings mean that you need medical attention.    Take your blood pressure medication exactly as directed. Don t skip doses. Missing doses can cause your blood pressure to get out of control.    Avoid medications that contain heart stimulants, including over-the-counter drugs. Check for warnings about high blood pressure on the label.    Check with your doctor before taking a decongestant. Some decongestants can worsen high blood pressure.  Lifestyle changes    Maintain a healthy weight. Get help to lose any extra pounds.    Cut back on salt.  o Limit canned, dried, packaged, and fast foods.  o Don t add salt to your food at the table.  o Season foods with herbs instead of salt when you cook.    Follow the DASH (Dietary Approaches to Stop Hypertension) eating plan. This plan recommends vegetables, fruits, whole gains, and other heart healthy foods.    Begin an exercise program. Ask your doctor how to get started. The American Heart Association recommends aerobic exercise 3 to 4 times a week for an average of 40 minutes at a time, with your doctor's approval. Simple activities like walking or gardening can help.    Break the smoking habit. Enroll in a stop-smoking program to improve your chances of success. Ask your health care provider about programs and medications to help you stop smoking.    Limit drinks that contain caffeine  (coffee, black or green tea, cola) to 2 per day.    Never take stimulants such as amphetamines or cocaine; these drugs can be deadly for someone with high blood pressure.    Control your stress. Learn stress-management techniques.    Limit alcohol to no more than 1 drink a day for women and 2 drinks a day for men.  Follow-up care  Make a follow-up appointment as directed by our staff.     When to seek medical care  Call your doctor immediately if you have any of the following:    Chest pain or shortness of breath (call 911)    Moderate to severe headache    Weakness in the muscles of your face, arms, or legs    Trouble speaking    Extreme drowsiness    Confusion    Fainting or dizziness    Pulsating or rushing sound in your ears    Unexplained nosebleed    Weakness, tingling, or numbness of your face, arms, or legs    Change in vision    Blood pressure measured at home that is greater than 180/110       Chronic Kidney Disease (CKD)  Chronic kidney disease can result from many causes including infections, diabetes, high blood pressure, kidney stones, circulation problems, and reactions to medication. Having kidney disease means making many changes in your life. Learn as much as you can about it so that you can better adjust to these changes. It is important to remember that the main goal of treatment is to stop chronic kidney disease (CKD) from progressing to complete kidney failure. Treatments may vary based on the progression of CKD, so always follow your doctor's instructions in the care and management of your condition.  When to seek medical care  Call your doctor right away if you have any of the following:    Trouble eating or drinking    Weight loss of more than 2 pounds in 24 hours or more than 5 pounds in 7 days    Little or no urine output    Trouble breathing    Muscle aches    Fever of 100.4 F or higher, or chills    Blood in your urine or stool    Bloody discharge from your nose, mouth, or ears    Severe  headache or a seizure    Vomiting    Swelling of legs or ankles    Chest pain (call 911)      Diabetes: Sick-Day Plan  Infections, the flu, and even a cold, can cause your blood sugar to rise. And, eating less, nausea, and vomiting may cause your blood glucose to fall (hypoglycemia). Ask your health care provider to help you develop a sick-day plan. The following information can help.    Call your health care provider if:    You vomit or have diarrhea for more than 6 hours.    Your blood glucose level is higher than usual or over 250 mg/dL after you have taken extra insulin (if recommended in your sick-day plan).    You take oral medicine for diabetes, and your blood sugar is higher than usual or over 250 mg/dL, before a meal and stays that high for more than 24 hours.    Your blood glucose is lower than usual or less than 70 mg/dL    You have moderate to large amounts of ketones in your blood or urine.    You aren t better after 2 days.

## 2021-06-10 NOTE — TELEPHONE ENCOUNTER
Please call patient -    ______________________________________________________________________     Home phone:  [phone] (home)     Cell phone:   Telephone Information:   Mobile [phone]       Other contacts:  Name Home Phone Work Phone Mobile Phone Relationship Lgl Grd   STACY ZAPATA [phone] Child    JAY ZAPATA   [phone] Child      ______________________________________________________________________     Her sodium is okay at this time.    Your cholesterol is doing well.     Your hemoglobin A1C is in the goal range - your diabetes is doing well at this time.      Your liver tests are normal.     Her kidneys are still leaking a small amount of protein.  I would like her to consider changing her blood pressure medication from metoprolol to losartan (Cozaar) to see if this would work better for her.  This would help to protect her kidneys in the long run.    She would do the followin.  Take 1/2 metoprolol for 10 days and then stop.  2.  Start losartan (Cozaar) 50 mg at the same time.  3.  Recheck her electrolytes and sodium in 4-5 weeks to make sure her potassium is okay.    Please see what she would like to do and then forward to me.  Thank you.    Jonathan Perez MD  [facility]  2020, 4:46 PM     ______________________________________________________________________    Recent Results (from the past 240 hour(s))   Comprehensive Metabolic Panel   Result Value Ref Range    Sodium 133 (L) 136 - 145 mmol/L    Potassium 4.9 3.5 - 5.0 mmol/L    Chloride 99 98 - 107 mmol/L    CO2 23 22 - 31 mmol/L    Anion Gap, Calculation 11 5 - 18 mmol/L    Glucose 175 (H) 70 - 125 mg/dL    BUN 17 8 - 28 mg/dL    Creatinine 1.04 0.60 - 1.10 mg/dL    GFR MDRD Af Amer >60 >60 mL/min/1.73m2    GFR MDRD Non Af Amer 52 (L) >60 mL/min/1.73m2    Bilirubin, Total 0.3 0.0 - 1.0 mg/dL    Calcium 9.4 8.5 - 10.5 mg/dL    Protein, Total 6.8 6.0 - 8.0 g/dL    Albumin 4.0 3.5 - 5.0  g/dL    Alkaline Phosphatase 96 45 - 120 U/L    AST 14 0 - 40 U/L    ALT 15 0 - 45 U/L   Lipid Cascade RANDOM   Result Value Ref Range    Cholesterol 172 <=199 mg/dL    Triglycerides 129 <=149 mg/dL    HDL Cholesterol 55 >=50 mg/dL    LDL Calculated 91 <=129 mg/dL    Patient Fasting > 8hrs? Yes    Glycosylated Hemoglobin A1c   Result Value Ref Range    Hemoglobin A1c 7.5 (H) <=5.6 %   Microalbumin, Random Urine   Result Value Ref Range    Microalbumin, Random Urine 8.08 (H) 0.00 - 1.99 mg/dL    Creatinine, Urine 43.2 mg/dL    Microalbumin/Creatinine Ratio Random Urine 187.0 (H) <=19.9 mg/g   Sodium, Random Urine   Result Value Ref Range    Sodium, Urine 22 mmol/L       ______________________________________________________________________

## 2021-06-10 NOTE — TELEPHONE ENCOUNTER
Pulse Readings from Last 20 Encounters:   08/21/20 74   02/21/20 71   01/31/20 79   05/13/19 75   01/14/19 65   05/21/18 67   09/08/17 77   02/24/17 66   08/30/16 60   02/18/16 60   11/02/15 61   07/20/15 64   06/11/15 62   05/21/15 61   05/12/15 60   05/12/15 70   04/13/15 68   03/30/15 60      BP Readings from Last 20 Encounters:   08/21/20 134/78   02/21/20 136/74   01/31/20 136/70   05/13/19 136/80   01/14/19 134/78   05/21/18 136/86   09/08/17 136/72   02/24/17 136/80   08/30/16 121/70   02/18/16 128/70   11/02/15 130/72   07/20/15 120/82   06/11/15 130/64   05/26/15 158/78   05/21/15 132/66   05/12/15 130/64   05/12/15 134/65   04/13/15 132/80   03/30/15 130/72

## 2021-06-10 NOTE — PATIENT INSTRUCTIONS - HE
I have recommended that you be seen by [facility].    Their phone number is [phone].    They are located at the following locations:    [place] Office  [facility] [place] [address]    Dr. Asia Foster    [place] [facility]  [address] [place] [address]    Dr. Og Garcia

## 2021-06-10 NOTE — TELEPHONE ENCOUNTER
Called to patient and relayed message. Patient stated she is okay with starting the new medication. Pharmacy verified in chart.     Patient requested results letter mailed as it is hard for her to remember the directions. Letter printed and placed in mail. Lab appointment scheduled

## 2021-06-11 NOTE — TELEPHONE ENCOUNTER
Refill Approved    Rx renewed per Medication Renewal Policy. Medication was last renewed on 9/23/19.    Makenzie Freitas, [facility] Triage/Med Refill 9/17/2020     Requested Prescriptions   Pending Prescriptions Disp Refills     metoprolol succinate (TOPROL-XL) 50 MG 24 hr tablet 90 tablet 3     Sig: Take 1 tablet (50 mg total) by mouth daily.       Beta-Blockers Refill Protocol Passed - 9/15/2020  1:38 PM        Passed - PCP or prescribing provider visit in past 12 months or next 3 months     Last office visit with prescriber/PCP: 8/21/2020 Jonathan Perez MD OR same dept: 8/21/2020 Jonathan Perez MD OR same specialty: 8/21/2020 Jonathan Perez MD  Last physical: Visit date not found Last MTM visit: Visit date not found   Next visit within 3 mo: Visit date not found  Next physical within 3 mo: Visit date not found  Prescriber OR PCP: Jonathan Perez MD  Last diagnosis associated with med order: 1. Essential hypertension  - metoprolol succinate (TOPROL-XL) 50 MG 24 hr tablet; Take 1 tablet (50 mg total) by mouth daily.  Dispense: 90 tablet; Refill: 3    If protocol passes may refill for 12 months if within 3 months of last provider visit (or a total of 15 months).             Passed - Blood pressure filed in past 12 months     BP Readings from Last 1 Encounters:   08/21/20 134/78

## 2021-06-11 NOTE — TELEPHONE ENCOUNTER
Refill Approved    Rx renewed per Medication Renewal Policy. Medication was last renewed on 8/23/2019.    Mei Herrera, [facility] Triage/Med Refill 9/20/2020     Requested Prescriptions   Pending Prescriptions Disp Refills     insulin glargine (LANTUS SOLOSTAR U-100 INSULIN) 100 unit/mL (3 mL) pen  0       Insulin/GLP-1 Refill Protocol Passed - 9/19/2020 12:53 PM        Passed - Visit with PCP or prescribing provider visit in last 6 months     Last office visit with prescriber/PCP: 8/21/2020 OR same dept: 8/21/2020 Jonathan Perez MD OR same specialty: 8/21/2020 Jonathan Perez MD Last physical: Visit date not found Last MTM visit: Visit date not found     Next appt within 3 mo: Visit date not found  Next physical within 3 mo: Visit date not found  Prescriber OR PCP: Jonathan Perez MD  Last diagnosis associated with med order: 1. DM type 2 (diabetes mellitus, type 2) (H)  - insulin glargine (LANTUS SOLOSTAR U-100 INSULIN) 100 unit/mL (3 mL) pen; Refill: 0    If protocol passes may refill for 6 months if within 3 months of last provider visit (or a total of 9 months).              Passed - A1C in last 6 months     Hemoglobin A1c   Date Value Ref Range Status   08/21/2020 7.5 (H) <=5.6 % Final     Comment:     Normal <5.7% Prediabete 5.7-6.4% Diabletes 6.5% or higher - adopted from ADA consensus guidelines               Passed - Microalbumin in last year     Microalbumin, Random Urine   Date Value Ref Range Status   08/21/2020 8.08 (H) 0.00 - 1.99 mg/dL Final                  Passed - Blood pressure in last year     BP Readings from Last 1 Encounters:   08/21/20 134/78             Passed - Creatinine done in last year     Creatinine   Date Value Ref Range Status   09/18/2020 1.20 (H) 0.60 - 1.10 mg/dL Final

## 2021-06-12 NOTE — PROGRESS NOTES
1. Scheduled F/U Call:  Attempt 1    Care Guide called patient.  If this patient is returning our call please transfer to  Karen  at ext [phone].

## 2021-06-12 NOTE — PROGRESS NOTES
Patient recently went to [place] stroll with [employer]. She said that her right hip started bothering her but she enjoyed being out of the house. She stated talking about her house. She said she was happy that her family doesn't come to her house to hang out because its a mess. She said, there is papers everywhere.  She then mentioned that she has had water in the basement and it seems to be a reoccurring issue. She says, I think there maybe mold.  I asked if her sons where helping. She says she doesn't see them often. She hasn't seen her oldest since July. Her younger son did help her find an electric .      Today, we discussed how our program has changed and for me to better support her to continue support, I would like for her to meet with my nurse. Patient was hesitant and stated, that would invovle another appointment correct? Yes, it would be at the [facility].  I advised her that I will come to see her on Friday in the clinic and give her time to think about this more. Patient agreed!

## 2021-06-12 NOTE — PROGRESS NOTES
Meet with patient today. We discussed how she felt about moving forward with the RN assessment.  She stated, at this time she would un-enroll from [facility] and not complete the assessment. Patient was advised that if she needed our support she can always be re-enrolled. Patient understood.     Patient has selected to un-enroll and no further outreach will be done. I have discussed with the patient's PCP and have removed patient from [facility] enrolled patient list.

## 2021-06-12 NOTE — TELEPHONE ENCOUNTER
"Refill Approved    Rx renewed per Medication Renewal Policy. Medication was last renewed on 9/26/19.    Makenzie Freitas, [facility] Triage/Med Refill 11/3/2020     Requested Prescriptions   Pending Prescriptions Disp Refills     pen needle, diabetic (BD ULTRA-FINE SHORT PEN NEEDLE) 31 gauge x 5/16\" Ndle 100 each 3       Diabetic Supplies Refill Protocol Passed - 11/1/2020  5:14 PM        Passed - Visit with PCP or prescribing provider visit in last 6 months     Last office visit with prescriber/PCP: 8/21/2020 Jonathan Perez MD OR same dept: Visit date not found OR same specialty: Visit date not found  Last physical: Visit date not found Last MTM visit: Visit date not found   Next visit within 3 mo: Visit date not found  Next physical within 3 mo: Visit date not found  Prescriber OR PCP: Jonathan Perez MD  Last diagnosis associated with med order: 1. DM type 2 (diabetes mellitus, type 2) (H)  - pen needle, diabetic (BD ULTRA-FINE SHORT PEN NEEDLE) 31 gauge x 5/16\" Ndle  Dispense: 100 each; Refill: 3    If protocol passes may refill for 12 months if within 3 months of last provider visit (or a total of 15 months).             Passed - A1C in last 6 months     Hemoglobin A1c   Date Value Ref Range Status   08/21/2020 7.5 (H) <=5.6 % Final     Comment:     Normal <5.7% Prediabete 5.7-6.4% Diabletes 6.5% or higher - adopted from ADA consensus guidelines                              "

## 2021-06-14 NOTE — TELEPHONE ENCOUNTER
RN cannot approve Refill Request    RN can NOT refill this medication med is not covered by policy/route to provider. Last office visit: 8/21/2020 Jonathan Perez MD Last Physical: Visit date not found Last MTM visit: Visit date not found Last visit same specialty: 8/21/2020 Jonathan Perez MD.  Next visit within 3 mo: Visit date not found  Next physical within 3 mo: Visit date not found      Luz Marina Moreno, [facility] Triage/Med Refill 1/28/2021    Requested Prescriptions   Pending Prescriptions Disp Refills     magnesium oxide (MAG-OX) 400 mg (241.3 mg magnesium) tablet 180 tablet 3     Sig: Take 1 tablet (400 mg total) by mouth 2 (two) times a day.       There is no refill protocol information for this order

## 2021-06-15 NOTE — TELEPHONE ENCOUNTER
Called to patient and relayed message. Patient stated understanding and thanked for the call.    She also requested a result letter. Printed and placed in the mail.

## 2021-06-15 NOTE — TELEPHONE ENCOUNTER
Please call patient -    ______________________________________________________________________     Home phone:  [phone] (home)     Cell phone:   Telephone Information:   Mobile [phone]       Other contacts:  Name Home Phone Work Phone Mobile Phone Relationship Lgl Grd   STACY ZAPATA [phone] Child    JAY ZAPATA   [phone] Child      ______________________________________________________________________     Her A1C is doing better at 6.7.    Kidneys and electrolytes are stable.    I would like her to resume the metoprolol for her blood pressure.    Take 1/2 pill for 1 week and then a full pill after that.    I sent a prescription to   [facility] [id number] [facility] - [place], [place] - [address]  [address] [place] [address] [address] [place] [address]  Phone: [phone] Fax: [phone]      Jonathan Perez MD  [facility]  2/23/2021, 10:19 PM     ______________________________________________________________________    Recent Results (from the past 240 hour(s))   Electrocardiogram Perform and Read   Result Value Ref Range    SYSTOLIC BLOOD PRESSURE      DIASTOLIC BLOOD PRESSURE      VENTRICULAR RATE 73 BPM    ATRIAL RATE 73 BPM    P-R INTERVAL 146 ms    QRS DURATION 76 ms    Q-T INTERVAL 388 ms    QTC CALCULATION (BEZET) 427 ms    P Axis 57 degrees    R AXIS 61 degrees    T AXIS 71 degrees    MUSE DIAGNOSIS       Normal sinus rhythm  Normal ECG  When compared with ECG of 08-JUL-2008 18:30,  No significant change was found  Confirmed by DERIC RAMOS, ATUL LOC:WW ([id number]) on 2/23/2021 11:27:51 AM     Glycosylated Hemoglobin A1c   Result Value Ref Range    Hemoglobin A1c 6.7 (H) <=5.6 %   Basic Metabolic Panel   Result Value Ref Range    Sodium 132 (L) 136 - 145 mmol/L    Potassium 4.9 3.5 - 5.0 mmol/L    Chloride 95 (L) 98 - 107 mmol/L    CO2 28 22 - 31 mmol/L    Anion Gap, Calculation 9 5 - 18 mmol/L    Glucose 117 70 - 125 mg/dL    Calcium 9.3 8.5 - 10.5 mg/dL     BUN 14 8 - 28 mg/dL    Creatinine 1.03 0.60 - 1.10 mg/dL    GFR MDRD Af Amer >60 >60 mL/min/1.73m2    GFR MDRD Non Af Amer 52 (L) >60 mL/min/1.73m2       ______________________________________________________________________

## 2021-06-15 NOTE — PATIENT INSTRUCTIONS - HE
We are working hard to begin vaccinating more people against COVID-19. Currently, we are only vaccinating individuals age 75 and older and Phase 1a workers - healthcare workers who are unable to do their job remotely. Vaccine availability is very limited.    If you are 75 or older, or a healthcare worker who is unable to do your job remotely, please log in to Bio using this link to schedule an appointment.  If there are no appointments left, you will be unable to schedule and need to check back later.  If you are a healthcare worker, you will be asked to provide proof of employment at your appointment. If you cannot, you will be turned away.    Vaccine appointments are being added as they become available. Please check your Bio account frequently for availability. If you have technical difficulty using Bio, call [phone] for assistance.    You can learn more about the [facility]'s phased approach to administering the vaccine, with details on each phase, [email or web address].      Aa vaccine supply increases and we are able to open appointments to more groups, we will share that information on our website [email or web address]. Check this website to stay up to date on COVID-19 vaccination information.    ______________________________________________________________________      No future appointments.

## 2021-06-16 PROBLEM — Z66 DNR (DO NOT RESUSCITATE): Chronic | Status: ACTIVE | Noted: 2017-09-10

## 2021-06-16 PROBLEM — N39.46 MIXED INCONTINENCE: Status: ACTIVE | Noted: 2019-01-14

## 2021-06-16 PROBLEM — F32.1 MODERATE MAJOR DEPRESSION (H): Status: ACTIVE | Noted: 2019-01-14

## 2021-06-16 PROBLEM — G63 POLYNEUROPATHY ASSOCIATED WITH UNDERLYING DISEASE (H): Status: ACTIVE | Noted: 2021-02-23

## 2021-06-16 PROBLEM — E11.29 MICROALBUMINURIA DUE TO TYPE 2 DIABETES MELLITUS (H): Status: ACTIVE | Noted: 2020-08-23

## 2021-06-16 PROBLEM — R80.9 MICROALBUMINURIA DUE TO TYPE 2 DIABETES MELLITUS (H): Status: ACTIVE | Noted: 2020-08-23

## 2021-06-16 PROBLEM — Z79.899 ON ANGIOTENSIN RECEPTOR BLOCKERS (ARB): Status: ACTIVE | Noted: 2021-02-25

## 2021-06-17 NOTE — PATIENT INSTRUCTIONS - HE
"Patient Instructions by Jonathan Perez MD at 5/13/2019 10:55 AM     Author: Jonathan Perez MD Service: -- Author Type: Physician    Filed: 5/13/2019 11:18 AM Encounter Date: 5/13/2019 Status: Addendum    : Jonathan Perez MD (Physician)    Related Notes: Original Note by Jonathan Perez MD (Physician) filed at 5/13/2019 11:18 AM       You are due or coming due for you next tetanus vaccine.  If you are on Medicare, please check to see if your supplemental insurance covers this vaccine, as Medicare traditionally does not pay for this.    Here is some more information related to tetanus and why we recommend this vaccine:    Patient education: Tetanus (The Basics)    Written by the doctors and editors at [facility]    What is tetanus? -- Tetanus is a serious infection that causes muscle stiffness and spasms. It is sometimes called \"lockjaw\" because muscle spasms can clench the jaw shut.    Tetanus is caused by bacteria (germs) that live in the soil. They can get into the body through a cut or scrape. They can also get into the body if a person uses a needle to inject illegal drugs. Most people in the [place] are protected from these bacteria because they have gotten vaccines against them.    What are the symptoms of tetanus? -- The symptoms include:    ?Stiff jaw or neck muscles, which make it hard to move your jaw or neck normally  ?Strange-looking smile that does not go away when you try to relax your mouth  ?Tight, painful muscles that do not let go when you try to relax them  ?Trouble breathing, swallowing, or both  ?Feeling irritable or restless  ?Sweating even when you are not exercising or hot  ?Heartbeat that is faster than usual, or irregular heartbeat  ?Fever  ?Painful muscle spasms    People who are very sick with tetanus can have muscle spasms that force the body into a \"bridge\" position. They might have:    ?Clenched fists  ?Back arched off the floor or bed  ?Legs stretched out  ?Arms " moving back and forth  ?Trouble breathing - They might even stop breathing during a muscle spasm.    Should I see a doctor or nurse? -- See your doctor or nurse right away if:    ?You get a puncture wound, for example from a nail that goes through your skin.  ?You get a cut, scrape, or other injury you cannot clean completely.  ?You have an injury that leaves something (like a nail or glass) inside your body.  ?An animal bites you.  ?You have diabetes, and get a sore on your foot, leg, or other place.  ?You have a stiff jaw or neck, other tight muscles you cannot relax, or painful muscle spasms.  ?You have trouble breathing or swallowing.    It is especially important to see a doctor or nurse if you get a puncture wound or animal bite and your last tetanus shot was 5 years ago or longer, or if you do not remember getting a tetanus shot.    Is there a test for tetanus? -- No. There is no simple test. But your doctor or nurse should be able to tell if you have it by learning about your symptoms and vaccine history, and by doing an exam. This infection is most likely in people who have had an injury and who have not had the tetanus vaccine at all or not had the right vaccine boosters.    Is tetanus dangerous? -- Yes. People with tetanus need to go to the hospital, and some people even die from it. The muscle spasms can stop your breathing.    How is tetanus treated? -- Doctors treat tetanus in the hospital, sometimes in the intensive care unit (ICU). Treatments include:    ?Cleaning cuts or scrapes to remove skin and tissue that could have tetanus bacteria on it  ?Giving medicines to fight the infection  ?Giving a tetanus vaccine booster  ?Giving medicine and other treatments to reduce muscle spasms, breathing problems, pain, and other symptoms  ?Using a ventilator (breathing machine) if you have trouble breathing on your own  ?Using a feeding tube if you cannot eat or drink on your own  ?Having physical therapy to  help muscles recover    Can tetanus be prevented? -- Yes. To reduce your chances of getting tetanus, do these things:    ?Get a tetanus vaccine. This teaches your body how to fight tetanus. Most children growing up in the [place] get this vaccine as part of their routine childhood vaccines.  ?Get regular tetanus booster shots. Adults should get tetanus booster shots every 10 years. For bad wounds, you will need to get a tetanus booster shot if you haven't had one in the last 5 years. If you have a bad wound and you haven't received all of your tetanus vaccines or you are not sure if you have, you will need a tetanus booster shot and another shot to fight any tetanus bacteria that got in the wound.  ?Wash cuts or scrapes with soap and water and use antibiotic ointment on them. See a doctor or nurse if you cannot get all the dirt out or cannot see all the way into the wound.  ?Do not inject illegal drugs, or at least use clean needles if you do inject drugs or anything else.    ______________________________________________________________________      The new shingles shot (Shingrix) is 2 separate shots  by 2-6 months.    The cost out of pocket is around $390 for the 2 shots, so you might want to see if your insurance covers it or a portion of it prior to having it done.  Often by having it done at a pharmacy rather than a clinic, it can be cheaper for you.    It is estimated that any person's risk of shingles over their lifetime is around 10-20%.    If you have had the old shingles vaccine (Zostavax), it is about 50% effective, reducing your risk of shingles to about 5-10% over your lifetime.    The new shot is 90% effective, reducing your risk of shingles to about 1-2% over your lifetime.    Because the shot is strong, it is very common to have flu like symptoms for 2-3 days after the shot.  25-50% of patients will have fever, muscle aches or headache.    I believe that whether or not you have this  vaccine is your own decision depending on your values and preferences.  The information above I give you to make an informed decision.    Jonathan Perez MD  [facility]  ______________________________________________________________________      I strongly recommend that you work on stopping smoking.  It is one of the most important things to do for your health.    By stopping smoking, you take a huge step in reducing your risk of heart attack and stroke.  You also significantly reduce your risk of lung cancer, esophageal cancer, bladder cancer, and other cancers over time.    The benefits of stopping smoking are significant even as soon as 1 year after smoking.    For more information, you may go to www.quitplan.com or call 1-342.718.4796.

## 2021-06-17 NOTE — TELEPHONE ENCOUNTER
Telephone Encounter by Chago Macdonald CMA at 7/30/2020  2:47 PM     Author: Chago Macdonald CMA Service: -- Author Type: Certified Medical Assistant    Filed: 7/30/2020  2:48 PM Encounter Date: 7/30/2020 Status: Signed    : Chago Macdonald CMA (Certified Medical Assistant)       Pharmacy requesting refill of test strips.    Pt last seen 2/21/20  Pt next visit is 8/13/20    Plan:      1. PHQ9 due at next visit.   2. Check blood work and urine tests this visit.  3. Continue off of lisinopril at this time.  4. Further advice based on tests.  5. Continue current medications.           Jonathan Perez MD  General Internal Medicine  [facility]    Personal office fax - [phone]   Voice mail - [phone]  E-mail - [email or web address]      Return in about 6 months (around 8/21/2020), or if symptoms worsen or fail to improve, for visit and blood work.

## 2021-06-17 NOTE — TELEPHONE ENCOUNTER
Telephone Encounter by Chago Macdonald CMA at 1/28/2021 10:03 AM     Author: Chago Macdonald CMA Service: -- Author Type: Certified Medical Assistant    Filed: 1/28/2021 10:05 AM Encounter Date: 1/28/2021 Status: Signed    : Chago Macdonald CMA (Certified Medical Assistant)       Pharmacy requesting refill of Magnesium  Pharmacy requesting refill of magnesium oxide  Pt last seen 8/21/20  Next appointment 2/23/21    Plan:      1. Check blood work today.  See relevant orders and diagnosis associations at the bottom of this note.   2. Might need to try an angiotensin receptor blocker (ARB) for proteinuria.  Will consider.  3. Diabetes form for driving done today.  4. Consider a follow up MRI scan of her back in the future.  5. Follow up sooner if issues   6. EKG next visit to get a new baseline tracing, diagnosis: hypertension.           Jonathan Perez MD  General Internal Medicine  [facility]    Personal office fax - [phone]   Voice mail - [phone]  E-mail - [email or web address]      Return in about 6 months (around 2/21/2021), or if symptoms worsen or fail to improve, for follow up visit, visit and blood work.

## 2021-06-17 NOTE — TELEPHONE ENCOUNTER
Telephone Encounter by Chago Macdonald CMA at 7/29/2020  2:28 PM     Author: Chago Macdonald CMA Service: -- Author Type: Certified Medical Assistant    Filed: 7/29/2020  2:29 PM Encounter Date: 7/29/2020 Status: Signed    : Chago Macdonald CMA (Certified Medical Assistant)       pharmacy requesting refill of Metformin.    Pt last seen 2/21/20  Next appointment 8/13/20    Plan:      1. PHQ9 due at next visit.   2. Check blood work and urine tests this visit.  3. Continue off of lisinopril at this time.  4. Further advice based on tests.  5. Continue current medications.           Jonathan Perez MD  General Internal Medicine  [facility]    Personal office fax - [phone]   Voice mail - [phone]  E-mail - [email or web address]      Return in about 6 months (around 8/21/2020), or if symptoms worsen or fail to improve, for visit and blood work.

## 2021-06-17 NOTE — PATIENT INSTRUCTIONS - HE
Patient Instructions by Jonathan Perez MD at 1/14/2019  9:40 AM     Author: Jonathan Perez MD Service: -- Author Type: Physician    Filed: 1/14/2019 10:12 AM Encounter Date: 1/14/2019 Status: Addendum    : Jonathan Perez MD (Physician)    Related Notes: Original Note by Jonathan Perez MD (Physician) filed at 1/14/2019 10:11 AM       The new shingles shot (Shingrix) is 2 separate shots  by 2-6 months.    The cost out of pocket is around $390 for the 2 shots, so you might want to see if your insurance covers it or a portion of it prior to having it done.  Often by having it done at a pharmacy rather than a clinic, it can be cheaper for you.    It is estimated that any person's risk of shingles over their lifetime is around 10-20%.    The old shingles vaccine (Zostavax) is about 50% effective, reducing your risk of shingles to about 5-10% over your lifetime.    The new shot is 90% effective, reducing your risk of shingles to about 1-2% over your lifetime.    Because the shot is strong, it is very common to have flu like symptoms for 2-3 days after the shot.  25-50% of patients will have fever, muscle aches or headache.    I believe that whether or not you have this vaccine is your own decision depending on your values and preferences.  The information above I give you to make an informed decision.    Jonathan Perez MD  [facility]  ______________________________________________________________________      You are due or are coming due for your mammogram at this time.      The [place] Preventive Services Task Force guidelines recommends considering mammograms every 2 years for women between the ages of 50 and 75.    If you are interested in proceeding with a mammogram, you may schedule this without a physician's order.    Call [phone] in order to schedule a mammogram through [facility].    Jonathan Perez MD  [facility]  ______________________________________________________________________                Patient Education     Kegel Exercises  Kegel exercises dont need special clothing or equipment. Theyre easy to learn and simple to do. And if you do them right, no one can tell youre doing them, so they can be done almost anywhere. Your healthcare provider, nurse, or physical therapist can answer any questions you have and help you get started.    A weak pelvic floor  The pelvic floor muscles may weaken due to aging, pregnancy and vaginal childbirth, injury, surgery, chronic cough, or lack of exercise. If the pelvic floor is weak, your bladder and other pelvic organs may sag out of place. The urethra may also open too easily and allow urine to leak out. Kegel exercises can help you strengthen your pelvic floor muscles. Then they can better support the pelvic organs and control urine flow.  How Kegel exercises are done  Try each of the Kegel exercises described below. When youre doing them, try not to move your leg, buttock, or stomach muscles:    Contract as if you were stopping your urine stream. But do it when youre not urinating.    Tighten your rectum as if trying not to pass gas. Contract your anus, but dont move your buttocks.    You may place a finger or 2 in the vagina and squeeze your finger with your vagina to learn which muscles to tighten.  Try to hold each Kegel for a slow count to 5. You probably wont be able to hold them for that long at first. But keep practicing. It will get easier as your pelvic floor gets stronger. Eventually, special weights that you place in your vagina may be recommended to help make your Kegels even more effective. Visit your healthcare provider if you have difficulties doing Kegel exercises.  Helpful hints  Here are some tips to follow:    Do your Kegels as often as you can. The more you do them, the faster youll feel the results.    Pick an activity you do often as a reminder. For  instance, do your Kegels every time you sit down.    Tighten your pelvic floor before you sneeze, get up from a chair, cough, laugh, or lift. This protects your pelvic floor from injury and can help prevent urine leakage.   Date Last Reviewed: 10/1/2017    7028-7397 [employer]. [address] [place] [address], [place] [address]. All rights reserved. This information is not intended as a substitute for professional medical care. Always follow your healthcare professional's instructions.

## 2021-06-17 NOTE — TELEPHONE ENCOUNTER
Telephone Encounter by Chago Macdonald CMA at 4/22/2021  1:42 PM     Author: Chago Macdonald CMA Service: -- Author Type: Certified Medical Assistant    Filed: 4/22/2021  1:44 PM Encounter Date: 4/22/2021 Status: Signed    : Chago Macdonald CMA (Certified Medical Assistant)       Pharmacy requesting refill of Furosemide.  Pt last seen 2/23/21  Due to be seen around 8/23/21  Assessment and Plan:      1. Essential hypertension  We did a baseline EKG today to have on file.  We are likely going to add back in the metoprolol and follow-up.     - Electrocardiogram Perform and Read  - Basic Metabolic Panel     2. Type 2 diabetes mellitus with diabetic polyneuropathy, with long-term current use of insulin (H)  Currently doing okay at this time.  Follow-up sooner if issues.     - blood glucose test strips; Use 1 each As Directed 2 (two) times a day. Dispense brand per patient's insurance at pharmacy discretion.  Dispense: 200 strip; Refill: 3  - Glycosylated Hemoglobin A1c     3. Lumbar radicular pain  No current issues at this point.     - gabapentin (NEURONTIN) 300 MG capsule; TAKE 1 CAPSULE BY MOUTH THREE TIMES A DAY  Dispense: 270 capsule; Refill: 3     4. Mixed hyperlipidemia     - atorvastatin (LIPITOR) 40 MG tablet; TAKE 1 TABLET BY MOUTH EVERYDAY AT BEDTIME  Dispense: 90 tablet; Refill: 3     5. Polyneuropathy associated with underlying disease (H)  Continue gabapentin for this as well.     6. Moderate major depression (H)  Needs follow-up PHQ-9 at next visit.     7. Stage 3a chronic kidney disease  Stable at this time and monitor.     8. On angiotensin receptor blockers (ARB)  Given due to proteinuria.           Jnoathan Perez MD  General Internal Medicine  [facility]        Return in about 6 months (around 8/23/2021), or if symptoms worsen or fail to improve, for visit and blood work.

## 2021-06-18 NOTE — PROGRESS NOTES
BP Readings from Last 20 Encounters:   05/21/18 152/70   09/08/17 136/72   02/24/17 136/80   08/30/16 121/70   02/18/16 128/70   11/02/15 130/72   07/20/15 120/82   06/11/15 130/64   05/26/15 158/78   05/21/15 132/66   05/12/15 130/64   05/12/15 134/65   04/13/15 132/80   03/30/15 130/72

## 2021-06-19 NOTE — LETTER
Letter by Jonathan Perez MD at      Author: Jonathan Perez MD Service: -- Author Type: --    Filed:  Encounter Date: 5/13/2019 Status: (Other)       5/13/2019    Rhea FRANCINE Jasmine   [address] [place] [address]         Dear Rhea,    It was good to see you in clinic.  I hope your questions were answered at the time of your visit.    The results of your tests from your visit were as follows:    Lab Results   Component Value Date    HGBA1C 6.9 (H) 05/13/2019      Your A1C is doing well at this time and I would not recommend any further changes in your treatment.    Your chest x-ray was good without concerning findings.     Please follow up again in 6 months, sooner if issues.    If you have any questions regarding these results, please feel free to contact me at [phone].  I wish you the best of health!        Sincerely,      Jonathan Perez MD  General Internal Medicine  [facility]

## 2021-06-19 NOTE — LETTER
Letter by Jonathan Perez MD at      Author: Jonathan Perez MD Service: -- Author Type: --    Filed:  Encounter Date: 11/24/2019 Status: Signed       Rhea FRANCINE Jasmine   [address] [place] [address]         Dear Rhea,    I am sending you this letter to remind you that you are due for a follow up visit in January.    Please call to schedule this visit as soon as possible as our schedule fills up quickly.    If you already have an appointment scheduled with me for around this time, please ignore this notification.    I look forward to seeing you to review your overall health.           Sincerely,    Jonathan Perez MD  General Internal Medicine  [facility]

## 2021-06-20 NOTE — LETTER
Letter by Jonathan Perez MD at      Author: Jonathan Perez MD Service: -- Author Type: --    Filed:  Encounter Date: 8/23/2020 Status: (Other)         Rhea Jasmine  [address] [place] [address]      08/24/20      Dear Rhea,      Thank you for choosing [facility] as your Primary Care Provider.  Here are the results from your recent Office Visit with Dr. Perez:      Office Visit on 08/21/2020   Component Date Value Ref Range Status   ? Microalbumin, Random Urine 08/21/2020 8.08* 0.00 - 1.99 mg/dL Final   ? Creatinine, Urine 08/21/2020 43.2  mg/dL Final   ? Microalbumin/Creatinine Ratio Rand* 08/21/2020 187.0* <=19.9 mg/g Final   ? Sodium 08/21/2020 133* 136 - 145 mmol/L Final   ? Potassium 08/21/2020 4.9  3.5 - 5.0 mmol/L Final   ? Chloride 08/21/2020 99  98 - 107 mmol/L Final   ? CO2 08/21/2020 23  22 - 31 mmol/L Final   ? Anion Gap, Calculation 08/21/2020 11  5 - 18 mmol/L Final   ? Glucose 08/21/2020 175* 70 - 125 mg/dL Final   ? BUN 08/21/2020 17  8 - 28 mg/dL Final   ? Creatinine 08/21/2020 1.04  0.60 - 1.10 mg/dL Final   ? GFR MDRD Af Amer 08/21/2020 >60  >60 mL/min/1.73m2 Final   ? GFR MDRD Non Af Amer 08/21/2020 52* >60 mL/min/1.73m2 Final   ? Bilirubin, Total 08/21/2020 0.3  0.0 - 1.0 mg/dL Final   ? Calcium 08/21/2020 9.4  8.5 - 10.5 mg/dL Final   ? Protein, Total 08/21/2020 6.8  6.0 - 8.0 g/dL Final   ? Albumin 08/21/2020 4.0  3.5 - 5.0 g/dL Final   ? Alkaline Phosphatase 08/21/2020 96  45 - 120 U/L Final   ? AST 08/21/2020 14  0 - 40 U/L Final   ? ALT 08/21/2020 15  0 - 45 U/L Final   ? Sodium, Urine 08/21/2020 22  mmol/L Final   ? Cholesterol 08/21/2020 172  <=199 mg/dL Final   ? Triglycerides 08/21/2020 129  <=149 mg/dL Final   ? HDL Cholesterol 08/21/2020 55  >=50 mg/dL Final   ? LDL Calculated 08/21/2020 91  <=129 mg/dL Final   ? Patient Fasting > 8hrs? 08/21/2020 Yes   Final   ? Hemoglobin A1c 08/21/2020 7.5* <=5.6 % Final    Normal <5.7% Prediabete 5.7-6.4% Diabletes 6.5%  or higher - adopted from ADA consensus guidelines         Her sodium is okay at this time.     Your cholesterol is doing well.      Your hemoglobin A1C is in the goal range - your diabetes is doing well at this time.       Your liver tests are normal.      Her kidneys are still leaking a small amount of protein.  I would like her to consider changing her blood pressure medication from metoprolol to losartan (Cozaar) to see if this would work better for her.  This would help to protect her kidneys in the long run.     She would do the followin.  Take 1/2 metoprolol for 10 days and then stop.  2.  Start losartan (Cozaar) 50 mg at the same time.  3.  Recheck her electrolytes and sodium in 4-5 weeks to make sure her potassium is okay.         Sincerely,    Melinda Eugene   CMA - CMT/CA  [facility]  [address] [place] [address] [phone]

## 2021-06-20 NOTE — LETTER
Letter by Jonathan Perez MD at      Author: Jonathan Perez MD Service: -- Author Type: --    Filed:  Encounter Date: 9/18/2020 Status: (Other)       9/18/2020    Rhea Jasmine   [address]  [place] [address]         Dear Rhea,    Here are the recent results of your visit to the lab:    Resulted Orders   Basic Metabolic Panel   Result Value Ref Range    Sodium 131 (L) 136 - 145 mmol/L    Potassium 4.6 3.5 - 5.0 mmol/L    Chloride 93 (L) 98 - 107 mmol/L    CO2 24 22 - 31 mmol/L    Anion Gap, Calculation 14 5 - 18 mmol/L    Glucose 119 70 - 125 mg/dL    Calcium 9.5 8.5 - 10.5 mg/dL    BUN 15 8 - 28 mg/dL    Creatinine 1.20 (H) 0.60 - 1.10 mg/dL    GFR MDRD Af Amer 53 (L) >60 mL/min/1.73m2    GFR MDRD Non Af Amer 44 (L) >60 mL/min/1.73m2    Narrative    Fasting Glucose reference range is 70-99 mg/dL per  American Diabetes Association (ADA) guidelines.     Your blood work is still doing okay.  No changes in your medication are needed at this time.    Please follow up again in 5-6 months, sooner if issues.    If you have any questions regarding these results, please feel free to contact me at [phone].  I wish you the best of health!      Sincerely,      Jonathan Perez MD  General Internal Medicine  [facility]           Per Jessica Boyd is OK to proceed to OR.

## 2021-06-20 NOTE — LETTER
Letter by Jonathan Perez MD at      Author: Jonathan Perez MD Service: -- Author Type: --    Filed:  Encounter Date: 2/25/2020 Status: (Other)             Rhea Jasmine  [address] [place] [address]          Rhea Jasmine    Recent Results (from the past 240 hour(s))   Basic Metabolic Panel   Result Value Ref Range    Sodium 129 (L) 136 - 145 mmol/L    Potassium 4.5 3.5 - 5.0 mmol/L    Chloride 93 (L) 98 - 107 mmol/L    CO2 23 22 - 31 mmol/L    Anion Gap, Calculation 13 5 - 18 mmol/L    Glucose 100 70 - 125 mg/dL    Calcium 9.3 8.5 - 10.5 mg/dL    BUN 14 8 - 28 mg/dL    Creatinine 1.09 0.60 - 1.10 mg/dL    GFR MDRD Af Amer 59 (L) >60 mL/min/1.73m2    GFR MDRD Non Af Amer 49 (L) >60 mL/min/1.73m2   Osmolality   Result Value Ref Range    Osmolality, Blood 268 (L) 270 - 300 mOsm/kg   Cortisol   Result Value Ref Range    Cortisol 11.9 ug/dL   Microalbumin, Random Urine   Result Value Ref Range    Microalbumin, Random Urine 7.20 (H) 0.00 - 1.99 mg/dL    Creatinine, Urine 35.1 mg/dL    Microalbumin/Creatinine Ratio Random Urine 205.1 (H) <=19.9 mg/g   Osmolality, Random, Urine   Result Value Ref Range    Osmolality, Urine 176 (L) 300 - 900 mOsm/kg   Sodium, Random Urine   Result Value Ref Range    Sodium, Urine <20 mmol/L     Your sodium is better and the other tests were good overall.     Your potassium is better as well.     Continue current medications as is.     Follow up again in 6 months.         Jonathan Perez MD  [facility]  2/25/2020, 7:58 AM

## 2021-06-21 NOTE — LETTER
Letter by Jonathan Perez MD at      Author: Jonathan Perez MD Service: -- Author Type: --    Filed:  Encounter Date: 2/23/2021 Status: (Other)         Rhea Jasmine  [address] [place] [address]      02/24/21      Dear Rhea,      Thank you for choosing [facility] as your Primary Care Provider.  Here are the results from your recent Office Visit with Dr. Perez:    Her A1C is doing better at 6.7.     Kidneys and electrolytes are stable.     I would like her to resume the metoprolol for her blood pressure.     Take 1/2 pill for 1 week and then a full pill after that.     I sent a prescription to   [facility] [id number] [facility] - [place], [place] - [address] [place] [address] [address] [place] [address] E  [place] [address]  Phone: [phone] Fax: [phone]      Jonathan Perez MD  [facility]  2/23/2021, 10:19 PM      Office Visit on 02/23/2021   Component Date Value Ref Range Status   ? VENTRICULAR RATE 02/23/2021 73  BPM Final   ? ATRIAL RATE 02/23/2021 73  BPM Final   ? P-R INTERVAL 02/23/2021 146  ms Final   ? QRS DURATION 02/23/2021 76  ms Final   ? Q-T INTERVAL 02/23/2021 388  ms Final   ? QTC CALCULATION (BEZET) 02/23/2021 427  ms Final   ? P Axis 02/23/2021 57  degrees Final   ? R AXIS 02/23/2021 61  degrees Final   ? T AXIS 02/23/2021 71  degrees Final   ? MUSE DIAGNOSIS 02/23/2021    Final                    Value:Normal sinus rhythm  Normal ECG  When compared with ECG of 08-JUL-2008 18:30,  No significant change was found  Confirmed by DERIC RAMOS, ATUL LOC:WW ([id number]) on 2/23/2021 11:27:51 AM     ? Hemoglobin A1c 02/23/2021 6.7* <=5.6 % Final   ? Sodium 02/23/2021 132* 136 - 145 mmol/L Final   ? Potassium 02/23/2021 4.9  3.5 - 5.0 mmol/L Final   ? Chloride 02/23/2021 95* 98 - 107 mmol/L Final   ? CO2 02/23/2021 28  22 - 31 mmol/L Final   ? Anion Gap, Calculation 02/23/2021 9  5 - 18 mmol/L Final   ? Glucose 02/23/2021 117  70 - 125 mg/dL Final   ? Calcium 02/23/2021  9.3  8.5 - 10.5 mg/dL Final   ? BUN 02/23/2021 14  8 - 28 mg/dL Final   ? Creatinine 02/23/2021 1.03  0.60 - 1.10 mg/dL Final   ? GFR MDRD Af Amer 02/23/2021 >60  >60 mL/min/1.73m2 Final   ? GFR MDRD Non Af Amer 02/23/2021 52* >60 mL/min/1.73m2 Final         Please call [phone] if you have any questions or need to schedule an appointment.    We believe that a strong preventative care program, including regular physicals and follow-up care is an important part of a healthy lifestyle and we are committed to helping you maintain your health.    Thank you for choosing us as your health care provider.    Sincerely,    Melinda Eugene LPN - CMT/CA  [facility]  [address] [address]  [phone]

## 2021-06-23 NOTE — TELEPHONE ENCOUNTER
Informed patient of possible chicken pox exposure in clinic on 1/14/19. Advised her to see her PCP should she develop any symptoms.

## 2021-06-23 NOTE — TELEPHONE ENCOUNTER
Please call patient -    ______________________________________________________________________     Home phone:  [phone] (home)     Cell phone:   Telephone Information:   Mobile [phone]       Other contacts:  Name Home Phone Work Phone Mobile Phone Relationship Lgl Grd   STACY ZAPATA [phone] Child    JAY ZAPATA   [phone] Child      ______________________________________________________________________     Your hemoglobin A1C is in the goal range - your diabetes is doing well at this time.   It is better than it has been in the past.    I would recommend that she stop the glipizide at this time.  She should not need it for her diabetes.    Her cholesterol is doing well.  Her liver and kidney tests are stable.    Please follow up again in 4-6 months, but sooner if issues.  Could help her to schedule the next appointment if she wishes,     Jonathan Perez MD  [facility]  1/14/2019, 3:41 PM      ______________________________________________________________________    Recent Results (from the past 240 hour(s))   Lipid Cascade   Result Value Ref Range    Cholesterol 177 <=199 mg/dL    Triglycerides 95 <=149 mg/dL    HDL Cholesterol 62 >=50 mg/dL    LDL Calculated 96 <=129 mg/dL    Patient Fasting > 8hrs? Yes    Comprehensive Metabolic Panel   Result Value Ref Range    Sodium 132 (L) 136 - 145 mmol/L    Potassium 5.0 3.5 - 5.0 mmol/L    Chloride 97 (L) 98 - 107 mmol/L    CO2 23 22 - 31 mmol/L    Anion Gap, Calculation 12 5 - 18 mmol/L    Glucose 158 (H) 70 - 125 mg/dL    BUN 21 8 - 28 mg/dL    Creatinine 1.13 (H) 0.60 - 1.10 mg/dL    GFR MDRD Af Amer 57 (L) >60 mL/min/1.73m2    GFR MDRD Non Af Amer 47 (L) >60 mL/min/1.73m2    Bilirubin, Total 0.3 0.0 - 1.0 mg/dL    Calcium 9.6 8.5 - 10.5 mg/dL    Protein, Total 7.0 6.0 - 8.0 g/dL    Albumin 4.1 3.5 - 5.0 g/dL    Alkaline Phosphatase 81 45 - 120 U/L    AST 14 0 - 40 U/L    ALT 12 0 - 45 U/L   Magnesium   Result Value  Ref Range    Magnesium 2.1 1.8 - 2.6 mg/dL   Glycosylated Hemoglobin A1c   Result Value Ref Range    Hemoglobin A1c 6.2 (H) 3.5 - 6.0 %       ______________________________________________________________________

## 2021-06-23 NOTE — TELEPHONE ENCOUNTER
Informed pt of Dr. Perez's message.    Pt states an understanding and thanked for the call.    Pt scheduled 5/13/19 at 10:55.

## 2021-06-24 NOTE — TELEPHONE ENCOUNTER
Question following Office Visit  When did you see your provider: 1/14/19  What is your question: Patient stated she would a result letter mailed to her, from this day. Patient stated she thought Jonathan Perez MD was going mail her her results as well but she has not received anything in the mail yet.  Okay to leave a detailed message: No call back needed.

## 2021-06-24 NOTE — TELEPHONE ENCOUNTER
Who is calling:  Patient   Reason for Call:  Calling to update preferred pharmacy to [facility]   Date of last appointment with primary care: 1/14/19  Has the patient been recently seen:  Yes  Okay to leave a detailed message: Yes

## 2021-06-25 NOTE — TELEPHONE ENCOUNTER
RN cannot approve Refill Request    RN can NOT refill this medication med is not covered by policy/route to provider. Last office visit: 2/23/2021 Jonathan Perez MD Last Physical: Visit date not found Last MTM visit: Visit date not found Last visit same specialty: 2/23/2021 Jonathan Perez MD.  Next visit within 3 mo: Visit date not found  Next physical within 3 mo: Visit date not found      Kelly Salomon, [facility] Triage/Med Refill 6/2/2021    Requested Prescriptions   Pending Prescriptions Disp Refills     oxybutynin (DITROPAN) 5 MG tablet [Pharmacy Med Name: OXYBUTYNIN 5 MG TABLET] 90 tablet 3     Sig: TAKE 1 TABLET BY MOUTH EVERYDAY AT BEDTIME       There is no refill protocol information for this order

## 2021-06-25 NOTE — PROGRESS NOTES
Progress Notes by Jonathan Perez MD at 2/24/2017 10:05 AM     Author: Jonathan Perez MD Service: -- Author Type: Physician    Filed: 2/27/2017  8:51 AM Encounter Date: 2/24/2017 Status: Signed    : Jonathan Perez MD (Physician)              [facility]    Comprehensive and complex medical care - Chronic disease management - Shared decision making - Care coordination - Compassionate care    Patient advocacy - Rational deprescribing - Minimally disruptive medicine - Ethical focus - Customized care    Date of Service: 2/24/2017  Primary Provider: Jonathan Perez MD    Patient Care Team:  Jonathan Perez MD as PCP - General (Internal Medicine)  Edu Birch MD as Physician (Nephrology)  Karen Schroeder as Clinic Care Coordination Care Guide (Clinic Care Coordination)  Speedy Torres MD as Physician (Ophthalmology)     ______________________________________________________________________     Patient's Pharmacy:    [facility] [address] [place], [place] [address] [address] AT [facility] [address]  [address]  [facility] [address]  Phone: [phone] Fax: [phone]     Patient's Insurance:    Payor: ARE / Plan: [facility] / Product Type: MEDICARE ADVANTAGE /     ______________________________________________________________________     Rhea Jasmine is 71 y.o. female who comes in today for:     Chief Complaint   Patient presents with   ? Diabetes       Patient Active Problem List   Diagnosis   ? CKD (chronic kidney disease) stage 3, GFR 30-59 ml/min   ? HTN (hypertension)   ? PN (peripheral neuropathy)   ? Dysthymia   ? HLD (hyperlipidemia)   ? Tobacco abuse   ? OA (osteoarthritis)   ? SHIV (obstructive sleep apnea)   ? DM type 2 (diabetes mellitus, type 2)   ? Low back pain radiating to left leg     Past Medical History:   Diagnosis Date   ? CKD (chronic kidney disease) stage 3, GFR 30-59 ml/min    ? DM type 2  "(diabetes mellitus, type 2)    ? Dysthymia    ? HLD (hyperlipidemia)    ? HTN (hypertension)    ? OA (osteoarthritis)    ? SHIV (obstructive sleep apnea)     On CPAP   ? PN (peripheral neuropathy)    ? Tobacco abuse       Current Outpatient Prescriptions   Medication Sig   ? acetaminophen (TYLENOL) 500 MG tablet Take 1,000 mg by mouth as needed.   ? aspirin 81 MG EC tablet Take 81 mg by mouth daily.   ? atenolol (TENORMIN) 50 MG tablet TAKE 1 TABLET BY MOUTH DAILY.   ? atorvastatin (LIPITOR) 40 MG tablet TAKE 1 TABLET BY MOUTH DAILY AT BEDTIME   ? BD INSULIN PEN NEEDLE UF SHORT 31 gauge x 5/16\" Ndle USE DAILY AS DIRECTED.   ? cholecalciferol, vitamin D3, 1,000 unit tablet Take 1,000 Units by mouth daily.   ? citalopram (CELEXA) 40 MG tablet TAKE 1 TABLET BY MOUTH EVERY DAY   ? clindamycin (CLEOCIN) 300 MG capsule Take 2 capsules by  Mouth 1 hour prior to dental procdeure then 1 capsule 6 hours after dental procedure   ? CONTOUR NEXT STRIPS strips CHECK BLOOD SUGAR UP TO TWICE DAILY.   ? fluticasone (FLONASE) 50 mcg/actuation nasal spray Apply 2 sprays into each nostril daily.   ? furosemide (LASIX) 40 MG tablet Take 1 tablet (40 mg total) by mouth daily.   ? gabapentin (NEURONTIN) 300 MG capsule Take 1 capsule (300 mg total) by mouth 3 (three) times a day.   ? glipiZIDE (GLUCOTROL) 10 MG tablet TAKE ONE TABLET BY MOUTH BEFORE BREAKFAST ONE TABLET BY MOUTH BEFORE DINNER   ? guaiFENesin-dextromethorphan (MUCINEX DM) 600-30 mg Tb12 Take 1 tablet by mouth as needed as dialysis.   ? insulin needles, disposable, 31 Ndle Use As Directed. Use 1x/day   ? lancets (TRUEPLUS LANCETS) 28 gauge Misc Use As Directed 3 (three) times a day.   ? LANTUS SOLOSTAR 100 unit/mL (3 mL) pen INJECT 26 UNITS UNDER THE SKIN AT BEDTIME (Patient taking differently: INJECT 16 UNITS UNDER THE SKIN AT BEDTIME)   ? lisinopril (PRINIVIL,ZESTRIL) 2.5 MG tablet TAKE 1 TABLET BY MOUTH EVERY DAY   ? magnesium oxide (MAG-OX) 400 mg tablet TAKE 1 TABLET BY " MOUTH TWICE DAILY.   ? metFORMIN (GLUCOPHAGE-XR) 750 MG 24 hr tablet TAKE 3 TABLETS BY MOUTH DAILY   ? ranitidine (ZANTAC) 75 MG tablet Take 75 mg by mouth as needed for heartburn.   ? vitamin E 400 UNIT capsule Take 400 Units by mouth daily.     Social History     Social History   ? Marital status:      Spouse name: N/A   ? Number of children: 2   ? Years of education: N/A     Occupational History   ? Nursing assistant Retired     Social History Main Topics   ? Smoking status: Current Every Day Smoker   ? Smokeless tobacco: None   ? Alcohol use Yes   ? Drug use: No   ? Sexual activity: Not Asked     Other Topics Concern   ? None     Social History Narrative    Retired nursing assistant.        Previous patient of Dr. Ghotra.        Goes to [facility] for exercise.         Family History   Problem Relation Age of Onset   ? Heart disease Brother    ? Cancer Brother      BLADDER   ? Diabetes Brother    ? No Medical Problems Sister    ? Obesity Sister    ? Sarcoidosis Sister    ? Diabetes Brother    ? No Medical Problems Son    ? No Medical Problems Son    ? Colon cancer Mother    ? Heart failure Mother    ? Diabetes Mother    ? Diabetes Father    ? Heart disease Father       Family history is otherwise noncontributory.    ______________________________________________________________________     History of present illness:    The patient comes in today for a number of issues.    We first reviewed her tobacco abuse.  She continues to smoke at this time.  We have advised her to stop in the past.    We reviewed her depression better classified as dysthymia.  She is on citalopram for this.  Her patient health questionnaire-9 was reviewed today.  She is not interested in further counseling nor is she interested in a change or increase in her medications.  Some of this is situational as her brother  in October and her sister  in November.    We reviewed her diabetes.  Generally this has been  doing well for her.  She brings in records of her sugars today.  We reviewed this with her.  She's not really had too many issues with lows.    We reviewed her high blood pressure and blood pressure as an outpatient has been good and was good on the second check today.    We reviewed her hyperlipidemia and her cholesterol medication has been continued.    She does deal with some osteoarthritis in her hands and this is bothersome for her.    She's had some issues with diarrhea.  This has been off and on.  She is on 2 medications that can contribute to diarrhea-metformin and magnesium.  We talked about maybe cutting back on the magnesium is able.    She does have some issues with pains on the sides of her hips.  She has had this for a while and has had injected by rheumatology in the past.  They both are bothering her about the same.    She does agreed to mammogram screening at this time.    The 14-system review of systems form for [facility] is scanned into the chart.   ______________________________________________________________________     Exam:    Wt Readings from Last 3 Encounters:   02/24/17 210 lb 3.2 oz (95.3 kg)   08/30/16 213 lb (96.6 kg)   02/18/16 210 lb (95.3 kg)     BP Readings from Last 3 Encounters:   02/24/17 136/80   08/30/16 121/70   02/18/16 128/70      Visit Vitals   ? /80   ? Pulse 66   ? Wt 210 lb 3.2 oz (95.3 kg)   ? SpO2 99%   ? BMI 33.17 kg/m2      The patient is comfortable, no acute distress.  Mood flat to good.  Insight is good.  No skin lesions or nodules of concern.  Ears clear.  Eyes are nonicteric.  Pupils equal and reactive.  Throat is clear.  Neck is supple without mass, no thyromegaly.  Carotids are clear.  No cervical or epitrochlear adenopathy.  Heart regular rate and rhythm.  Lungs clear to auscultation bilaterally.  Respiratory effort good.  Abdomen soft and nontender.  No hepatosplenomegaly.  Extremities show trace edema.       ______________________________________________________________________    Diagnostics:    Results for orders placed or performed in visit on 02/24/17   Glycosylated Hemoglobin A1c   Result Value Ref Range    Hemoglobin A1c 6.8 (H) 3.5 - 6.0 %   Basic Metabolic Panel   Result Value Ref Range    Sodium 131 (L) 136 - 145 mmol/L    Potassium 5.0 3.5 - 5.0 mmol/L    Chloride 95 (L) 98 - 107 mmol/L    CO2 22 22 - 31 mmol/L    Anion Gap, Calculation 14 5 - 18 mmol/L    Glucose 118 70 - 125 mg/dL    Calcium 9.8 8.5 - 10.5 mg/dL    BUN 17 8 - 28 mg/dL    Creatinine 1.19 (H) 0.60 - 1.10 mg/dL    GFR MDRD Af Amer 54 (L) >60 mL/min/1.73m2    GFR MDRD Non Af Amer 45 (L) >60 mL/min/1.73m2   Magnesium   Result Value Ref Range    Magnesium 2.0 1.8 - 2.6 mg/dL      ______________________________________________________________________     Assessment:    1. Tobacco abuse    2. DM type 2 (diabetes mellitus, type 2)    3. HTN (hypertension)    4. HLD (hyperlipidemia)    5. CKD (chronic kidney disease) stage 3, GFR 30-59 ml/min    6. Dysthymia    7. Hypomagnesemia    8. Need for influenza vaccination    9. OA (osteoarthritis)    10. Trochanteric bursitis of both hips    11. Diarrhea    12. Visit for screening mammogram       ______________________________________________________________________     PHQ-2 Total Score: 2 (2/24/2017  9:00 AM)  Depression Follow-up Plan: patient follow-up to return when and if necessary (2/24/2017  9:00 AM)  PHQ-9 Total Score: 12 (2/24/2017  9:00 AM)      Plan:    1. Blood work as above.  2. Recommended to stop smoking.  3. Diabetes doing well, could consider reducing metformin in the future if needed.  4. Blood pressure doing well.  5. No changes in medications for dysthymia.  6. Reduce magnesium pills by 1 pill a day.  7. Consider injection to the hips if needed in the future.  8. Mammogram ordered.    Jonathan Perez MD  General Internal Medicine  [facility]    Return in about 6  months (around 8/24/2017) for visit and blood work.

## 2021-06-25 NOTE — PROGRESS NOTES
Progress Notes by Jonathan Perez MD at 9/8/2017 10:30 AM     Author: Jonathan Perez MD Service: -- Author Type: Physician    Filed: 9/10/2017  5:39 PM Encounter Date: 9/8/2017 Status: Signed    : Jonathan Perez MD (Physician)              [facility]    Comprehensive and complex medical care - Chronic disease management - Shared decision making - Care coordination - Compassionate care    Patient advocacy - Rational deprescribing - Minimally disruptive medicine - Ethical focus - Customized care    Date of Service: 9/8/2017  Primary Provider: Jonathan Perez MD    Patient Care Team:  Jonathan Perez MD as PCP - General (Internal Medicine)  Speedy Torres MD as Physician (Ophthalmology)     ______________________________________________________________________     Patient's Pharmacy:    [facility] [id number] [place] [address] [address] AT [facility] [address]  [address]  [facility] [address]  Phone: [phone] Fax: [phone]     Patient's Insurance:    Payor: UCARE / Plan: UCARE FOR SENIORS / Product Type: MEDICARE ADVANTAGE /     ______________________________________________________________________     Rhea Jasmine is 72 y.o. female who comes in today for:    Chief Complaint   Patient presents with   ? Follow-up     diabetes        Patient Active Problem List   Diagnosis   ? CKD (chronic kidney disease) stage 3, GFR 30-59 ml/min   ? HTN (hypertension)   ? PN (peripheral neuropathy)   ? Dysthymia   ? HLD (hyperlipidemia)   ? Tobacco abuse   ? OA (osteoarthritis)   ? SHIV (obstructive sleep apnea)   ? DM type 2 (diabetes mellitus, type 2)   ? Low back pain radiating to left leg   ? DNR (do not resuscitate)     Current Outpatient Prescriptions   Medication Sig   ? acetaminophen (TYLENOL) 500 MG tablet Take 1,000 mg by mouth as needed.   ? aspirin 81 MG EC tablet Take 81 mg by mouth daily.   ? atorvastatin (LIPITOR)  "40 MG tablet TAKE 1 TABLET BY MOUTH DAILY AT BEDTIME   ? cholecalciferol, vitamin D3, 1,000 unit tablet Take 1,000 Units by mouth daily.   ? citalopram (CELEXA) 40 MG tablet Take 1 tablet (40 mg total) by mouth daily.   ? clindamycin (CLEOCIN) 300 MG capsule Take 2 capsules by  Mouth 1 hour prior to dental procdeure then 1 capsule 6 hours after dental procedure   ? CONTOUR NEXT STRIPS strips CHECK BLOOD SUGAR UP TO TWICE DAILY.   ? fluticasone (FLONASE) 50 mcg/actuation nasal spray Apply 2 sprays into each nostril daily.   ? furosemide (LASIX) 40 MG tablet Take 1 tablet (40 mg total) by mouth daily.   ? gabapentin (NEURONTIN) 300 MG capsule Take 1 capsule (300 mg total) by mouth 3 (three) times a day.   ? glipiZIDE (GLUCOTROL) 10 MG tablet Take 1 tablet (10 mg total) by mouth 2 (two) times a day before meals.   ? guaiFENesin-dextromethorphan (MUCINEX DM) 600-30 mg Tb12 Take 1 tablet by mouth as needed as dialysis.   ? insulin glargine (LANTUS SOLOSTAR) 100 unit/mL (3 mL) pen Inject 20 Units under the skin at bedtime.   ? insulin needles, disposable, 31 Ndle Use As Directed. Use 1x/day   ? lancets (TRUEPLUS LANCETS) 28 gauge Misc Use As Directed 3 (three) times a day.   ? lisinopril (PRINIVIL,ZESTRIL) 2.5 MG tablet TAKE 1 TABLET BY MOUTH EVERY DAY   ? magnesium oxide (MAG-OX) 400 mg tablet TAKE 1 TABLET BY MOUTH TWICE DAILY.   ? metFORMIN (GLUCOPHAGE-XR) 750 MG 24 hr tablet Take 3 tablets (2,250 mg total) by mouth daily. Please keep this Rx on file for the next RF.   ? metoprolol succinate (TOPROL-XL) 50 MG 24 hr tablet Take 1 tablet (50 mg total) by mouth daily. The patient is to continue this long term and not use atenolol.   ? pen needle, diabetic (BD INSULIN PEN NEEDLE UF SHORT) 31 gauge x 5/16\" Ndle USE DAILY AS DIRECTED.   ? ranitidine (ZANTAC) 75 MG tablet Take 75 mg by mouth as needed for heartburn.   ? vitamin E 400 UNIT capsule Take 400 Units by mouth daily.   ? atenolol (TENORMIN) 50 MG tablet TAKE 1 " TABLET BY MOUTH DAILY.     Social History     Social History Narrative    Retired nursing assistant.        Previous patient of Dr. Ghotra.        Goes to [facility] for exercise.         ______________________________________________________________________     History of present illness:    Patient comes in today for a number of issues.    We reviewed the patient's diabetes and it is doing okay.  No significant hypoglycemia.      Reviewed her hypertension today.  Blood pressure has been in the goal range.  Denies any excessive dizziness from the medication with this.    We reviewed her other issues noted in the assessment but not specifically addressed in the HPI above.    She does have issues with joint pain.  This is in both lateral hips.  She does have it in her hands and MCP joints.  She also notes it in her right shoulder.  It is usually worse in the morning.  It takes her time to warm up.  She feels that is worsening for her.    We reviewed CODE STATUS with her.  She does have a healthcare directive on file.  She wishes to be full CODE STATUS.      On review of systems, the patient denies any chest pain or shortness of breath.    ______________________________________________________________________    Exam:    Wt Readings from Last 3 Encounters:   09/08/17 208 lb (94.3 kg)   02/24/17 210 lb 3.2 oz (95.3 kg)   08/30/16 213 lb (96.6 kg)     BP Readings from Last 3 Encounters:   09/08/17 136/72   02/24/17 136/80   08/30/16 121/70     /72  Pulse 77  Wt 208 lb (94.3 kg)  SpO2 98%  BMI 32.82 kg/m2   The patient is comfortable, no acute distress.  Mood good.  Insight good.  Eyes are nonicteric.  Neck is supple without mass.  No cervical adenopathy.  No thyromegaly. Heart regular rate and rhythm.  Lungs clear to auscultation bilaterally.  Respiratory effort is good.  Abdomen soft and nontender.  No hepatosplenomegaly.  Extremities no edema.No synovitis of the hands is appreciated at this  time.      ______________________________________________________________________    Diagnostics:    Results for orders placed or performed in visit on 09/08/17   Comprehensive Metabolic Panel   Result Value Ref Range    Sodium 133 (L) 136 - 145 mmol/L    Potassium 4.8 3.5 - 5.0 mmol/L    Chloride 98 98 - 107 mmol/L    CO2 21 (L) 22 - 31 mmol/L    Anion Gap, Calculation 14 5 - 18 mmol/L    Glucose 126 (H) 70 - 125 mg/dL    BUN 13 8 - 28 mg/dL    Creatinine 1.13 (H) 0.60 - 1.10 mg/dL    GFR MDRD Af Amer 57 (L) >60 mL/min/1.73m2    GFR MDRD Non Af Amer 47 (L) >60 mL/min/1.73m2    Bilirubin, Total 0.3 0.0 - 1.0 mg/dL    Calcium 9.3 8.5 - 10.5 mg/dL    Protein, Total 6.8 6.0 - 8.0 g/dL    Albumin 4.0 3.5 - 5.0 g/dL    Alkaline Phosphatase 85 45 - 120 U/L    AST 18 0 - 40 U/L    ALT 16 0 - 45 U/L   Glycosylated Hemoglobin A1c   Result Value Ref Range    Hemoglobin A1c 6.9 (H) 3.5 - 6.0 %   Magnesium   Result Value Ref Range    Magnesium 1.8 1.8 - 2.6 mg/dL   Sedimentation Rate   Result Value Ref Range    Sed Rate 28 (H) 0 - 20 mm/hr   C-Reactive Protein   Result Value Ref Range    CRP 0.6 0.0 - 0.8 mg/dL   Rheumatoid Factor Quant   Result Value Ref Range    Rheumatoid Factor Quantitative <15.0 0 - 30 IU/mL      ______________________________________________________________________    Assessment:    1. DM type 2 (diabetes mellitus, type 2)    2. Tobacco abuse    3. HTN (hypertension)    4. PN (peripheral neuropathy)    5. CKD (chronic kidney disease) stage 3, GFR 30-59 ml/min    6. HLD (hyperlipidemia)    7. Dysthymia    8. Joint pain    9. DNR (do not resuscitate)       ______________________________________________________________________      PHQ-2 Total Score: 2 (9/8/2017 11:00 AM)  Depression Follow-up Plan: patient follow-up to return when and if necessary (9/8/2017 11:00 AM)  PHQ-9 Total Score: 10 (9/8/2017 11:00 AM)    Plan:    1. Check blood work today.   2. DNR code status confirmed.   3. Medication  reconciliation was performed as a part of this visit as well.   4. Patient does not want additional meds or counseling for her mood.    Jonathan Perez MD  General Internal Medicine  [facility]     Return in about 6 months (around 3/8/2018) for visit and blood work.

## 2021-06-26 NOTE — PROGRESS NOTES
Progress Notes by Jonathan Perez MD at 5/21/2018 10:30 AM     Author: Jonathan Perez MD Service: -- Author Type: Physician    Filed: 5/21/2018  3:58 PM Encounter Date: 5/21/2018 Status: Signed    : Jonathan Perez MD (Physician)              [facility]    Comprehensive and complex medical care - Chronic disease management - Shared decision making - Care coordination - Compassionate care    Patient advocacy - Rational deprescribing - Minimally disruptive medicine - Ethical focus - Customized care    ______________________________________________________________________     Date of Service: 5/21/2018  Primary Provider: Jonathan Perez MD    Patient Care Team:  Jonathan Perez MD as PCP - General (Internal Medicine)  Speedy Torres MD as Physician (Ophthalmology)     ______________________________________________________________________     Patient's Pharmacy:    [facility] [id number] [place] [address] [address] AT [facility] [address]  [address]  [facility] [address]  Phone: [phone] Fax: [phone]     Patient's Contacts:  Name Home Phone Work Phone Mobile Phone Relationship Lgl Grd   STACY JASMINE [phone] Child    BENNYJAY   [phone] Child      Patient's Insurance:    Payor: UCARE / Plan: UCARE FOR SENIORS / Product Type: MEDICARE ADVANTAGE /     ______________________________________________________________________     Rhea Jasmine is 73 y.o. female who comes in today for:    Chief Complaint   Patient presents with   ? Back Pain     low back pain from started on 5/18 after waking up   ? Diabetes   ? Nail Problem     finger nails have ridges   ? Labs Only     lipid       Patient Active Problem List   Diagnosis   ? CKD (chronic kidney disease) stage 3, GFR 30-59 ml/min   ? HTN (hypertension)   ? PN (peripheral neuropathy)   ? Dysthymia   ? HLD (hyperlipidemia)   ? Tobacco abuse   ? OA  "(osteoarthritis)   ? SHIV (obstructive sleep apnea)   ? DM type 2 (diabetes mellitus, type 2) - C peptide 2.4 (2018)   ? Low back pain radiating to left leg   ? DNR (do not resuscitate)      Current Outpatient Prescriptions   Medication Sig   ? acetaminophen (TYLENOL) 500 MG tablet Take 1,000 mg by mouth as needed.   ? aspirin 81 MG EC tablet Take 81 mg by mouth daily.   ? atenolol (TENORMIN) 50 MG tablet TAKE 1 TABLET BY MOUTH DAILY.   ? cholecalciferol, vitamin D3, 1,000 unit tablet Take 1,000 Units by mouth daily.   ? citalopram (CELEXA) 40 MG tablet Take 1 tablet (40 mg total) by mouth daily.   ? clindamycin (CLEOCIN) 300 MG capsule Take 2 capsules by  Mouth 1 hour prior to dental procdeure then 1 capsule 6 hours after dental procedure   ? CONTOUR NEXT STRIPS strips CHECK BLOOD SUGAR UP TO TWICE DAILY.   ? fluticasone (FLONASE) 50 mcg/actuation nasal spray Apply 2 sprays into each nostril daily.   ? furosemide (LASIX) 40 MG tablet Take 1 tablet (40 mg total) by mouth daily. LAST REFILL NEEDS TO BE SEEN.   ? gabapentin (NEURONTIN) 300 MG capsule TAKE 1 CAPSULE(300 MG) BY MOUTH THREE TIMES DAILY   ? glipiZIDE (GLUCOTROL) 10 MG tablet Take 1 tablet (10 mg total) by mouth every morning.   ? insulin glargine (LANTUS SOLOSTAR) 100 unit/mL (3 mL) pen Inject 20 Units under the skin at bedtime.   ? insulin needles, disposable, 31 Ndle Use As Directed. Use 1x/day   ? lancets (TRUEPLUS LANCETS) 28 gauge Misc Use As Directed 3 (three) times a day.   ? lisinopril (PRINIVIL,ZESTRIL) 2.5 MG tablet TAKE 1 TABLET BY MOUTH EVERY DAY   ? metFORMIN (GLUCOPHAGE-XR) 500 MG 24 hr tablet Take 4 tablets (2,000 mg total) by mouth daily.   ? metoprolol succinate (TOPROL-XL) 50 MG 24 hr tablet Take 1 tablet (50 mg total) by mouth daily. The patient is to continue this long term and not use atenolol.   ? pen needle, diabetic (BD INSULIN PEN NEEDLE UF SHORT) 31 gauge x 5/16\" Ndle USE DAILY AS DIRECTED.   ? ranitidine (ZANTAC) 75 MG tablet " Take 75 mg by mouth as needed for heartburn.   ? vitamin E 400 UNIT capsule Take 400 Units by mouth daily.   ? atorvastatin (LIPITOR) 40 MG tablet TAKE 1 TABLET BY MOUTH DAILY AT BEDTIME   ? CONTOUR NEXT STRIPS strips CHECK BLOOD SUGAR UP TO TWICE DAILY.   ? magnesium oxide (MAG-OX) 400 mg tablet Take 1 tablet (400 mg total) by mouth 2 (two) times a day.     Social History     Social History Narrative    Retired nursing assistant.        Previous patient of Dr. Ghotra.        Goes to [facility] for exercise.         ______________________________________________________________________     History of present illness:    Patient comes in today for a number of issues.    In particular, her back has been worse.  This is been worse since last Friday.  It is in the middle of the low back.  She recently went out to lunch and was sitting on uncomfortable  chair at that time.  She has had a dull pain here.  There is no radiation down the legs.  She has been seen at the spine clinic in the past.  She denies any change in her gait.  She denies any numbness down her legs.    Reviewed her bilateral trochanteric bursitis and this continues to bother her off and on.  She does not want to do anything further with this.    Reviewed the patient's tobacco abuse, but she is currently not interested in stopping.     Her depression is doing well on the current medication.  She does not want to make any further changes.    Reviewed her hyponatremia and issues with this.    We reviewed her recent blood work with her.    On review of systems, the patient denies any chest pain or shortness of breath.    ______________________________________________________________________    Exam:    Wt Readings from Last 3 Encounters:   05/21/18 204 lb 6.4 oz (92.7 kg)   09/08/17 208 lb (94.3 kg)   02/24/17 210 lb 3.2 oz (95.3 kg)     BP Readings from Last 3 Encounters:   05/21/18 136/86   09/08/17 136/72   02/24/17 136/80     /86   Pulse 67  Wt 204 lb 6.4 oz (92.7 kg)  SpO2 98%  BMI 32.25 kg/m2    The patient is comfortable, no acute distress.  Mood good.  Insight good.  Eyes are nonicteric.  Neck is supple without mass.  No cervical adenopathy.  No thyromegaly. Heart regular rate and rhythm.  Lungs clear to auscultation bilaterally.  Respiratory effort is good.  Abdomen soft and nontender.  No hepatosplenomegaly.  Extremities no edema.  She does move slowly due to her back pain.  This is particularly when getting up out of a chair.  Her reflexes are good in the lower extremities.  Her back is mildly tender to palpation.    ______________________________________________________________________    Diagnostics:    Results for orders placed or performed in visit on 03/08/18   Glycosylated Hemoglobin A1c   Result Value Ref Range    Hemoglobin A1c 6.9 (H) 3.5 - 6.0 %   Basic Metabolic Panel   Result Value Ref Range    Sodium 128 (L) 136 - 145 mmol/L    Potassium 4.8 3.5 - 5.0 mmol/L    Chloride 94 (L) 98 - 107 mmol/L    CO2 23 22 - 31 mmol/L    Anion Gap, Calculation 11 5 - 18 mmol/L    Glucose 97 70 - 125 mg/dL    Calcium 9.3 8.5 - 10.5 mg/dL    BUN 12 8 - 28 mg/dL    Creatinine 0.90 0.60 - 1.10 mg/dL    GFR MDRD Af Amer >60 >60 mL/min/1.73m2    GFR MDRD Non Af Amer >60 >60 mL/min/1.73m2   C-Peptide   Result Value Ref Range    C Peptide 2.4 0.9 - 6.9 ng/mL   Cortisol   Result Value Ref Range    Cortisol 12.9 ug/dL      ______________________________________________________________________    Assessment:    1. DM type 2 (diabetes mellitus, type 2)    2. Hypomagnesemia    3. Tobacco abuse    4. HTN (hypertension)    5. Dysthymia    6. CKD (chronic kidney disease) stage 3, GFR 30-59 ml/min    7. Hyponatremia    8. Low back pain       ______________________________________________________________________     PHQ-2 Total Score: 3 (5/21/2018 11:00 AM)  Depression Follow-up Plan: patient follow-up to return when and if necessary (9/8/2017 11:00  AM)  PHQ-9 Total Score: 12 (5/21/2018 11:00 AM)  ______________________________________________________________________       Plan:    1. Consider returning to the spine clinic if continues to have issues with her back.  2. Blood work checked today.  3. Reduce her glipizide to 1 pill in the morning as she is having evening hypoglycemic episodes.  4. Continue other medications as is.  5. Follow-up blood work again in 6 months.  6. May need to consider increasing her furosemide if her sodium remains low.  7. Consider other tests for hyponatremia in the future if worsens.       Jonathan Perez MD  General Internal Medicine  [facility]     Personal office fax - [phone]   Voice mail - [phone]  E-mail - [email or web address]      Return in about 6 months (around 11/21/2018), or if symptoms worsen or fail to improve.     No future appointments.     ______________________________________________________________________    Relevant ICD-10 codes and order associations:      ICD-10-CM    1. DM type 2 (diabetes mellitus, type 2) E11.9 glipiZIDE (GLUCOTROL) 10 MG tablet   2. Hypomagnesemia E83.42 magnesium oxide (MAG-OX) 400 mg tablet   3. Tobacco abuse Z72.0    4. HTN (hypertension) I10    5. Dysthymia F34.1    6. CKD (chronic kidney disease) stage 3, GFR 30-59 ml/min N18.3    7. Hyponatremia E87.1    8. Low back pain M54.5

## 2021-06-27 NOTE — PROGRESS NOTES
Progress Notes by Jonathan Perez MD at 1/14/2019  9:40 AM     Author: Jonathan Perez MD Service: -- Author Type: Physician    Filed: 1/14/2019 10:45 AM Encounter Date: 1/14/2019 Status: Signed    : Jonathan Perez MD (Physician)              [facility]    Comprehensive and complex medical care - Chronic disease management - Shared decision making - Care coordination - Compassionate care    Patient advocacy - Rational deprescribing - Minimally disruptive medicine - Ethical focus - Customized care    ______________________________________________________________________     Date of Service: 1/14/2019  Primary Provider: Jonathan Perez MD    Patient Care Team:  Jonathan Perez MD as PCP - General (Internal Medicine)  Speedy Torres MD as Physician (Ophthalmology)     ______________________________________________________________________     Patient's Pharmacy:    [facility] [id number] [place] [address] [address] AT [facility] [address]  [address]  [facility] [address]  Phone: [phone] Fax: [phone]     Patient's Contacts:  Name Home Phone Work Phone Mobile Phone Relationship Lgl Grd   STACY JASMINE [phone] Child    JAY JASMINE   [phone] Child      Patient's Insurance:    Payor: UCARE / Plan: ARE MEDICARE / Product Type: MEDICARE ADVANTAGE /     ______________________________________________________________________     Rhea Jasmine is 73 y.o. female who comes in today for:    Chief Complaint   Patient presents with   ? Fall     END OF DECEMBER HURT KNEES, HIP   ? Follow-up     BACK PAIN, HYPONATERMIA, BILATERAL TROCHANTERIC BURSITIS   ? Urinary Incontinence     WHEN COUGH, SNEEZING       Active Problem List:  Problem List as of 1/14/2019 Reviewed: 1/13/2019  9:28 PM by Jonathan Perez MD       High    DM type 2 (diabetes mellitus, type 2) - C peptide 2.4 (2018) (H)    DNR (do not  "resuscitate)    Tobacco abuse       Medium    HTN (hypertension)    Moderate major depression (H)    PN (peripheral neuropathy)       Low    CKD (chronic kidney disease) stage 3, GFR 30-59 ml/min (H)    HLD (hyperlipidemia)    Low back pain radiating to left leg    OA (osteoarthritis)    SHIV (obstructive sleep apnea)       Unprioritized    Mixed incontinence    Tubular adenoma of colon - 2017 - Next colonoscopy 2022           Current Outpatient Medications   Medication Sig   ? acetaminophen (TYLENOL) 500 MG tablet Take 1,000 mg by mouth as needed.   ? aspirin 81 MG EC tablet Take 81 mg by mouth daily.   ? atorvastatin (LIPITOR) 40 MG tablet TAKE 1 TABLET(40 MG) BY MOUTH AT BEDTIME   ? BD ULTRA-FINE SHORT PEN NEEDLE 31 gauge x 5/16\" Ndle USE DAILY AS DIRECTED   ? cholecalciferol, vitamin D3, 1,000 unit tablet Take 1,000 Units by mouth daily.   ? citalopram (CELEXA) 40 MG tablet Take 1 tablet (40 mg total) by mouth daily.   ? clindamycin (CLEOCIN) 300 MG capsule Take 2 capsules by  Mouth 1 hour prior to dental procdeure then 1 capsule 6 hours after dental procedure   ? CONTOUR NEXT STRIPS strips CHECK BLOOD SUGAR UP TO TWICE DAILY.   ? fluticasone (FLONASE) 50 mcg/actuation nasal spray Apply 2 sprays into each nostril daily.   ? furosemide (LASIX) 40 MG tablet Take 1 tablet (40 mg total) by mouth daily.   ? gabapentin (NEURONTIN) 300 MG capsule TAKE 1 CAPSULE(300 MG) BY MOUTH THREE TIMES DAILY   ? glipiZIDE (GLUCOTROL) 10 MG tablet Take 1 tablet (10 mg total) by mouth every morning.   ? insulin needles, disposable, 31 Ndle Use As Directed. Use 1x/day   ? lancets (TRUEPLUS LANCETS) 28 gauge Misc Use As Directed 3 (three) times a day.   ? LANTUS SOLOSTAR U-100 INSULIN 100 unit/mL (3 mL) pen INJECT 20 UNITS UNDER THE SKIN AT BEDTIME.(MAY TAKE 16-20 UNITS AS DIRECTED)   ? lisinopril (PRINIVIL,ZESTRIL) 2.5 MG tablet Take 1 tablet (2.5 mg total) by mouth daily.   ? magnesium oxide (MAG-OX) 400 mg tablet TAKE 1 TABLET BY " MOUTH TWICE DAILY.   ? metFORMIN (GLUCOPHAGE-XR) 500 MG 24 hr tablet Take 4 tablets (2,000 mg total) by mouth daily.   ? metoprolol succinate (TOPROL-XL) 50 MG 24 hr tablet Take 1 tablet (50 mg total) by mouth daily.   ? ranitidine (ZANTAC) 75 MG tablet Take 75 mg by mouth as needed for heartburn.   ? vitamin E 400 UNIT capsule Take 400 Units by mouth daily.     Social History     Social History Narrative    Retired nursing assistant.        Previous patient of Dr. Ghotra.        Goes to [facility] for exercise.     ______________________________________________________________________     History of present illness:    Patient comes in today for a number of issues.    She had a fall on December 29.  She fell on her left side.  She had bruising of her left hip.  She had pain in her knees.  This is improving but it still hurts when getting up.    She has been losing weight on purpose.  She has cut back on her eating.  She does not feel unwell otherwise.    We reviewed her diabetes.  She does get some low sugars in the evenings as low as 43.  She takes her Lantus in the nighttime.  She does take 4 pills of metformin a day along with 10 of glipizide.  She has had her eye exam recently.    We reviewed her hand.  She did have some bruising of her hand after hitting a door of a vehicle about 2 months ago.  There is still some sensitivity here.    She has had some urinary incontinence.  This is mainly when she coughs, sneezes or gets up out of a chair.  She wears a pad.  She has tried Kegel's.  She also gets it in urgency as well.    We reviewed her other issues noted in the assessment but not specifically addressed in the HPI above.     On review of systems, the patient denies any chest pain or shortness of breath.    ______________________________________________________________________    Exam:    Wt Readings from Last 3 Encounters:   01/14/19 196 lb 3.2 oz (89 kg)   05/21/18 204 lb 6.4 oz (92.7 kg)  "  09/08/17 208 lb (94.3 kg)     BP Readings from Last 3 Encounters:   01/14/19 134/78   05/21/18 136/86   09/08/17 136/72     /78   Pulse 65   Ht 5' 6.5\" (1.689 m)   Wt 196 lb 3.2 oz (89 kg)   SpO2 97%   BMI 31.19 kg/m     The patient is comfortable, no acute distress.  Mood good.  Insight good.  Eyes are nonicteric.  Neck is supple without mass.  No cervical adenopathy.  No thyromegaly. Heart regular rate and rhythm.  Lungs clear to auscultation bilaterally.  Respiratory effort is good.  Abdomen soft and nontender.  No hepatosplenomegaly.  Extremities no edema.  She walks with a little bit of a limp due to her left hip pain.    ______________________________________________________________________    Diagnostics:    Results for orders placed or performed in visit on 03/08/18   Glycosylated Hemoglobin A1c   Result Value Ref Range    Hemoglobin A1c 6.9 (H) 3.5 - 6.0 %   Basic Metabolic Panel   Result Value Ref Range    Sodium 128 (L) 136 - 145 mmol/L    Potassium 4.8 3.5 - 5.0 mmol/L    Chloride 94 (L) 98 - 107 mmol/L    CO2 23 22 - 31 mmol/L    Anion Gap, Calculation 11 5 - 18 mmol/L    Glucose 97 70 - 125 mg/dL    Calcium 9.3 8.5 - 10.5 mg/dL    BUN 12 8 - 28 mg/dL    Creatinine 0.90 0.60 - 1.10 mg/dL    GFR MDRD Af Amer >60 >60 mL/min/1.73m2    GFR MDRD Non Af Amer >60 >60 mL/min/1.73m2   C-Peptide   Result Value Ref Range    C Peptide 2.4 0.9 - 6.9 ng/mL   Cortisol   Result Value Ref Range    Cortisol 12.9 ug/dL     ______________________________________________________________________    Assessment:    1. Type 2 diabetes mellitus with diabetic polyneuropathy, with long-term current use of insulin (H)    2. Tobacco abuse    3. Essential hypertension    4. Polyneuropathy associated with underlying disease (H)    5. CKD (chronic kidney disease) stage 3, GFR 30-59 ml/min (H)    6. Hypomagnesemia    7. Tubular adenoma of colon - 2017 - Next colonoscopy 2022    8. Visit for screening mammogram    9. " Moderate major depression (H)    10. Fall, initial encounter    11. Mixed incontinence    12. Hip pain, left      ______________________________________________________________________     PHQ-2 Total Score: 3 (1/14/2019 10:00 AM)  Depression Follow-up Plan: patient follow-up to return when and if necessary (1/14/2019 10:00 AM)    PHQ-9 Total Score: 15 (1/14/2019 10:00 AM)    ______________________________________________________________________    Plan:    1. Check blood work today.  See relevant orders and diagnosis associations at the bottom of this note.  2. Given information about Kegel's.  3. Given information about Shingrix.  4. Likely will cut back on her diabetic medication given evening lows.  Likely will stop or cut back on glipizide.  5. Recommended some smoking cessation.  6. Mammogram ordered.    Jonathan Perez MD  General Internal Medicine  [facility]     Return in about 6 months (around 7/14/2019), or if symptoms worsen or fail to improve.     No future appointments.      ______________________________________________________________________     Relevant ICD-10 codes and order associations:      ICD-10-CM    1. Type 2 diabetes mellitus with diabetic polyneuropathy, with long-term current use of insulin (H) E11.42 Lipid Cascade    Z79.4 Comprehensive Metabolic Panel     Glycosylated Hemoglobin A1c   2. Tobacco abuse Z72.0    3. Essential hypertension I10 Comprehensive Metabolic Panel   4. Polyneuropathy associated with underlying disease (H) G63    5. CKD (chronic kidney disease) stage 3, GFR 30-59 ml/min (H) N18.3    6. Hypomagnesemia E83.42 Magnesium   7. Tubular adenoma of colon - 2017 - Next colonoscopy 2022 D12.6    8. Visit for screening mammogram Z12.31 Mammo Screening Bilateral   9. Moderate major depression (H) F32.1    10. Fall, initial encounter W19.XXXA    11. Mixed incontinence N39.46    12. Hip pain, left M25.552

## 2021-06-27 NOTE — PROGRESS NOTES
Progress Notes by Jonathan Perez MD at 5/13/2019 10:55 AM     Author: Jonathan Perez MD Service: -- Author Type: Physician    Filed: 5/16/2019  8:34 AM Encounter Date: 5/13/2019 Status: Signed    : Jonathan Perez MD (Physician)              [facility]    Comprehensive and complex medical care - Chronic disease management - Shared decision making - Care coordination - Compassionate care    Patient advocacy - Rational deprescribing - Minimally disruptive medicine - Ethical focus - Customized care    ______________________________________________________________________     Date of Service: 5/13/2019  Primary Provider: Jonathan Perez MD    Patient Care Team:  Jonathan Perez MD as PCP - General (Internal Medicine)  Speedy Torres MD as Physician (Ophthalmology)     ______________________________________________________________________     Patient's Pharmacy:    [facility] [id number] [facility] - [place], [place] - [address]  [facility] [address]  [place] [address]  Phone: [phone] Fax: [phone]     Patient's Contacts:  Name Home Phone Work Phone Mobile Phone Relationship Lgl Grd   BENNYSTACY [phone] Child    JAY JASMINE   [phone] Child      Patient's Insurance:    Payor: UCARE / Plan: UCARE MEDICARE / Product Type: MEDICARE ADVANTAGE /   ______________________________________________________________________   ______________________________________________________________________     Rhea Jasmine is 74 y.o. female who comes in today for:    Chief Complaint   Patient presents with   ? Urinary Incontinence     getting worse   ? Diabetes       Active Problem List:  Problem List as of 5/13/2019 Reviewed: 5/13/2019  3:37 PM by Jonathan Perez MD       High    DM type 2 (diabetes mellitus, type 2) - C peptide 2.4 (2018) (H)    DNR (do not resuscitate)    Tobacco abuse       Medium    HTN (hypertension)     "Moderate major depression (H)    PN (peripheral neuropathy)       Low    CKD (chronic kidney disease) stage 3, GFR 30-59 ml/min (H)    HLD (hyperlipidemia)    Low back pain radiating to left leg    OA (osteoarthritis)    SHIV (obstructive sleep apnea)       Unprioritized    Mixed incontinence    On angiotensin-converting enzyme (ACE) inhibitors    Tubular adenoma of colon - 2017 - Next colonoscopy 2022           Current Outpatient Medications   Medication Sig   ? acetaminophen (TYLENOL) 500 MG tablet Take 1,000 mg by mouth as needed.   ? aspirin 81 MG EC tablet Take 81 mg by mouth daily.   ? atorvastatin (LIPITOR) 40 MG tablet TAKE 1 TABLET(40 MG) BY MOUTH AT BEDTIME   ? BD ULTRA-FINE SHORT PEN NEEDLE 31 gauge x 5/16\" Ndle USE DAILY AS DIRECTED   ? cholecalciferol, vitamin D3, 1,000 unit tablet Take 1,000 Units by mouth daily.   ? citalopram (CELEXA) 40 MG tablet Take 1 tablet (40 mg total) by mouth daily.   ? CONTOUR NEXT STRIPS strips CHECK BLOOD SUGAR UP TO TWICE DAILY.   ? fluticasone (FLONASE) 50 mcg/actuation nasal spray Apply 2 sprays into each nostril daily.   ? furosemide (LASIX) 40 MG tablet Take 1 tablet (40 mg total) by mouth daily.   ? gabapentin (NEURONTIN) 300 MG capsule TAKE 1 CAPSULE(300 MG) BY MOUTH THREE TIMES DAILY   ? insulin needles, disposable, 31 Ndle Use As Directed. Use 1x/day   ? lancets (TRUEPLUS LANCETS) 28 gauge Misc Use As Directed 3 (three) times a day.   ? LANTUS SOLOSTAR U-100 INSULIN 100 unit/mL (3 mL) pen INJECT 20 UNITS UNDER THE SKIN AT BEDTIME.(MAY TAKE 16-20 UNITS AS DIRECTED)   ? lisinopril (PRINIVIL,ZESTRIL) 2.5 MG tablet Take 1 tablet (2.5 mg total) by mouth daily.   ? magnesium oxide (MAG-OX) 400 mg tablet TAKE 1 TABLET BY MOUTH TWICE DAILY.   ? metFORMIN (GLUCOPHAGE-XR) 500 MG 24 hr tablet TAKE 4 TABLETS BY MOUTH DAILY   ? metoprolol succinate (TOPROL-XL) 50 MG 24 hr tablet Take 1 tablet (50 mg total) by mouth daily.   ? ranitidine (ZANTAC) 75 MG tablet Take 75 mg by mouth " as needed for heartburn.   ? vitamin E 400 UNIT capsule Take 400 Units by mouth daily.   ? oxybutynin (DITROPAN) 5 MG tablet Take 1 tablet (5 mg total) by mouth at bedtime.     Social History     Social History Narrative    Retired nursing assistant.        Previous patient of Dr. Ghotra.        Goes to [facility] for exercise.     ______________________________________________________________________     History of present illness:    In for follow up.    We reviewed the patient's diabetes and it is doing okay.  No significant hypoglycemia.      Having a lot of issues with urinary incontinence (UI) especially at night.  Bothers her sleep.  Kegels no help for her.  She would like to try something for this at this time.  No dysuria and previous testing reviewed.    Her depression is doing okay on the current medication. We reviewed additional medications and the patient declined this today.    Continues to smoke.    Reviewed her hypertension today.  Blood pressure has been in the goal range.  Denies any excessive dizziness from the medication with this.     We reviewed her other issues noted in the assessment but not specifically addressed in the HPI above.     On review of systems, the patient denies any chest pain or shortness of breath.    ______________________________________________________________________    Exam:    Wt Readings from Last 3 Encounters:   05/13/19 192 lb (87.1 kg)   01/14/19 196 lb 3.2 oz (89 kg)   05/21/18 204 lb 6.4 oz (92.7 kg)     BP Readings from Last 3 Encounters:   05/13/19 136/80   01/14/19 134/78   05/21/18 136/86     /80   Pulse 75   Wt 192 lb (87.1 kg)   SpO2 98%   BMI 30.53 kg/m      The patient is comfortable, no acute distress.  Mood good.  Insight good.  Eyes are nonicteric.  Neck is supple without mass.  No cervical adenopathy.  No thyromegaly. Heart regular rate and rhythm.  Lungs clear to auscultation bilaterally.  Respiratory effort is good.  Abdomen  soft and nontender.  No hepatosplenomegaly.  Extremities no edema.  The is evidence of trochanteric bursitis in both hips.    ______________________________________________________________________    Diagnostics:    Results for orders placed or performed in visit on 05/13/19   Glycosylated Hemoglobin A1c   Result Value Ref Range    Hemoglobin A1c 6.9 (H) 3.5 - 6.0 %   Basic Metabolic Panel   Result Value Ref Range    Sodium 129 (L) 136 - 145 mmol/L    Potassium 5.0 3.5 - 5.0 mmol/L    Chloride 94 (L) 98 - 107 mmol/L    CO2 21 (L) 22 - 31 mmol/L    Anion Gap, Calculation 14 5 - 18 mmol/L    Glucose 105 70 - 125 mg/dL    Calcium 9.8 8.5 - 10.5 mg/dL    BUN 12 8 - 28 mg/dL    Creatinine 1.10 0.60 - 1.10 mg/dL    GFR MDRD Af Amer 59 (L) >60 mL/min/1.73m2    GFR MDRD Non Af Amer 49 (L) >60 mL/min/1.73m2       ______________________________________________________________________    Assessment:    1. Type 2 diabetes mellitus with diabetic polyneuropathy, with long-term current use of insulin (H)    2. On angiotensin-converting enzyme (ACE) inhibitors    3. Mixed incontinence    4. SOB (shortness of breath)    5. Trochanteric bursitis of both hips    6. Moderate major depression (H)    7. Essential hypertension    8. Tobacco abuse       ______________________________________________________________________     PHQ-2 Total Score: 5 (5/13/2019 11:00 AM)  Depression Follow-up Plan: patient follow-up to return when and if necessary (5/13/2019 11:00 AM)    PHQ-9 Total Score: 16 (5/13/2019 11:00 AM)    ______________________________________________________________________       Plan:    1. Trial of oxybutynin (Ditropan) for urinary issues at night especially.  2. Check blood work today.  See relevant orders and diagnosis associations at the bottom of this note.   3. Weight loss is intentional per the patient.  4. Consider injection of the trochanteric bursitis in the future if wishes.  5. Follow up sooner if issues.          Jonathan Perez MD  General Internal Medicine  [facility]     Personal office fax - [phone]   Voice mail - [phone]  E-mail - [email or web address]      Return in about 6 months (around 11/13/2019).     No future appointments.     ______________________________________________________________________    Relevant ICD-10 codes and order associations:      ICD-10-CM    1. Type 2 diabetes mellitus with diabetic polyneuropathy, with long-term current use of insulin (H) E11.42 Glycosylated Hemoglobin A1c    Z79.4 Basic Metabolic Panel   2. On angiotensin-converting enzyme (ACE) inhibitors Z79.899    3. Mixed incontinence N39.46 oxybutynin (DITROPAN) 5 MG tablet   4. SOB (shortness of breath) R06.02 XR Chest 2 Views   5. Trochanteric bursitis of both hips M70.61     M70.62    6. Moderate major depression (H) F32.1    7. Essential hypertension I10    8. Tobacco abuse Z72.0

## 2021-06-28 NOTE — PROGRESS NOTES
Progress Notes by Jonathan Perez MD at 1/31/2020 10:55 AM     Author: Jonathan Perez MD Service: -- Author Type: Physician    Filed: 2/2/2020  8:20 PM Encounter Date: 1/31/2020 Status: Addendum    : Jonathan Perez MD (Physician)    Related Notes: Original Note by Jonathan Perez MD (Physician) filed at 2/2/2020  8:18 PM              [facility]    Comprehensive and complex medical care - Chronic disease management - Shared decision making - Care coordination - Compassionate care    Patient advocacy - Rational deprescribing - Minimally disruptive medicine - Ethical focus - Customized care          Date of Service: 1/31/2020  Primary Provider: Jonathna Perez MD    Patient Care Team:  Jonathan Perez MD as PCP - General (Internal Medicine)  Speedy Torres MD as Physician (Ophthalmology)  Jonathan Perez MD as Assigned PCP     ______________________________________________________________________     Patient's Pharmacy:    [id number] - [place], [place] [facility] [address]  [facility] [facility] [address]  [place] [address]  Phone: [phone] Fax: [phone]     Patient's Contacts:  Name Home Phone Work Phone Mobile Phone Relationship Lgl Grd   STACY JASMINE [phone] Child    JAY JASMINE   [phone] Child      Patient's Insurance:    Payor: [facility] / Plan: [facility] MEDICARE / Product Type: MEDICARE ADVANTAGE /           Rhea Jasmine is a 74 y.o. female who comes in today for:    Chief Complaint   Patient presents with   ? Referral     PODIATRIST, TOE NAIL ISSUES, SIDE OF FOOT HAS ISSUES   ? Diabetes   ? SPOTS     ON NOSE   ? WART     ON LEFT ARM       Active Problem List:  Problem List as of 1/31/2020 Reviewed: 5/13/2019  3:37 PM by Jonathan Perez MD       High    DM type 2 (diabetes mellitus, type 2) - C peptide 2.4 (2018) (H)    DNR (do not resuscitate)    Tobacco abuse       Medium    HTN (hypertension)     "Moderate major depression (H)    PN (peripheral neuropathy)       Low    CKD (chronic kidney disease) stage 3, GFR 30-59 ml/min (H)    HLD (hyperlipidemia)    Low back pain radiating to left leg    OA (osteoarthritis)    SHIV (obstructive sleep apnea)       Unprioritized    Mixed incontinence    On angiotensin-converting enzyme (ACE) inhibitors    Tubular adenoma of colon - 2017 - Next colonoscopy 2022           Current Outpatient Medications   Medication Sig   ? acetaminophen (TYLENOL) 500 MG tablet Take 1,000 mg by mouth as needed.   ? aspirin 81 MG EC tablet Take 81 mg by mouth daily.   ? atorvastatin (LIPITOR) 40 MG tablet TAKE 1 TABLET BY MOUTH AT BEDTIME   ? BD ULTRA-FINE SHORT PEN NEEDLE 31 gauge x 5/16\" Ndle USE DAILY AS DIRECTED   ? blood glucose test strips Use 1 each As Directed 2 (two) times a day. Dispense brand per patient's insurance at pharmacy discretion.   ? cholecalciferol, vitamin D3, 1,000 unit tablet Take 1,000 Units by mouth daily.   ? citalopram (CELEXA) 40 MG tablet TAKE 1 TABLET BY MOUTH EVERY DAY   ? fluticasone (FLONASE) 50 mcg/actuation nasal spray Apply 2 sprays into each nostril daily.   ? furosemide (LASIX) 40 MG tablet TAKE 1 TABLET BY MOUTH EVERY DAY   ? gabapentin (NEURONTIN) 300 MG capsule TAKE 1 CAPSULE BY MOUTH THREE TIMES DAILY   ? insulin needles, disposable, 31 Ndle Use As Directed. Use 1x/day   ? lancets (TRUEPLUS LANCETS) 28 gauge Misc Use As Directed 3 (three) times a day.   ? LANTUS SOLOSTAR U-100 INSULIN 100 unit/mL (3 mL) pen INJECT 20 UNITS SUBCUTANEOUSLY AT BEDTIME. MAY TAKE 16-20 UNITS AS DIRECTED   ? lisinopril (PRINIVIL,ZESTRIL) 2.5 MG tablet TAKE 1 TABLET BY MOUTH EVERY DAY   ? magnesium oxide (MAG-OX) 400 mg (241.3 mg magnesium) tablet TAKE 1 TABLET BY MOUTH TWICE A DAY   ? metFORMIN (GLUCOPHAGE-XR) 500 MG 24 hr tablet TAKE 4 TABLETS BY MOUTH DAILY   ? metoprolol succinate (TOPROL-XL) 50 MG 24 hr tablet TAKE 1 TABLET BY MOUTH EVERY DAY   ? oxybutynin (DITROPAN) 5 " "MG tablet Take 1 tablet (5 mg total) by mouth at bedtime.   ? ranitidine (ZANTAC) 75 MG tablet Take 75 mg by mouth as needed for heartburn.   ? vitamin E 400 UNIT capsule Take 400 Units by mouth daily.     Social History     Social History Narrative    Retired nursing assistant.        Previous patient of Dr. Ghotra.        Goes to [facility] for exercise.       Subjective:     Comes in today for multiple issues.    Has a skin lesion on the nasal bridge would like looked at - has thickened some in the last 2 months.    Has 2 arm lesions which are irritated.  Some back lesions she would like looked at.    We reviewed the patient's diabetes and it is doing okay.  No significant hypoglycemia.      Reviewed kidney disease and there has not been any issues with excessive edema.     Lab Results   Component Value Date    MICROALBUR 0.85 08/30/2016      Reviewed her hypertension today.  Blood pressure has been in the goal range.  Denies any excessive dizziness from the medication with this.     Her depression is doing about the same at this time.    We reviewed her other issues noted in the assessment but not specifically addressed in the HPI above.     On review of systems, the patient denies any chest pain or shortness of breath.    Objective:     Wt Readings from Last 3 Encounters:   01/31/20 193 lb 6.4 oz (87.7 kg)   05/13/19 192 lb (87.1 kg)   01/14/19 196 lb 3.2 oz (89 kg)     BP Readings from Last 3 Encounters:   01/31/20 136/70   05/13/19 136/80   01/14/19 134/78     /70   Pulse 79   Ht 5' 6.25\" (1.683 m)   Wt 193 lb 6.4 oz (87.7 kg)   SpO2 97%   BMI 30.98 kg/m     The patient is comfortable, no acute distress.  Mood good.  Insight good.  Eyes are nonicteric.  Neck is supple without mass.  No cervical adenopathy.  No thyromegaly. Heart regular rate and rhythm.  Lungs clear to auscultation bilaterally.  Respiratory effort is good.  Abdomen soft and nontender.  No hepatosplenomegaly.  " Extremities no edema.  There is an actinic keratosis (AK) on the bridge of the nose.  There are 2 irritated seborrheic keratoses (SKs) on the left arm.  Multiple seborrheic keratoses (SKs) on the back which need NO attention at this time.      Diagnostics:     Results for orders placed or performed in visit on 01/31/20   Glycosylated Hemoglobin A1c   Result Value Ref Range    Hemoglobin A1c 6.3 (H) 3.5 - 6.0 %   Basic Metabolic Panel   Result Value Ref Range    Sodium 124 (L) 136 - 145 mmol/L    Potassium 5.5 (H) 3.5 - 5.0 mmol/L    Chloride 88 (L) 98 - 107 mmol/L    CO2 23 22 - 31 mmol/L    Anion Gap, Calculation 13 5 - 18 mmol/L    Glucose 107 70 - 125 mg/dL    Calcium 9.7 8.5 - 10.5 mg/dL    BUN 10 8 - 28 mg/dL    Creatinine 1.04 0.60 - 1.10 mg/dL    GFR MDRD Af Amer >60 >60 mL/min/1.73m2    GFR MDRD Non Af Amer 52 (L) >60 mL/min/1.73m2       Assessment:     1. Type 2 diabetes mellitus with diabetic polyneuropathy, with long-term current use of insulin (H)    2. Polyneuropathy associated with underlying disease (H)    3. Moderate major depression (H)    4. CKD (chronic kidney disease) stage 3, GFR 30-59 ml/min (H)    5. Hyponatremia    6. Essential hypertension    7. Tobacco abuse    8. AK (actinic keratosis)    9. SK (seborrheic keratosis)    10. Hyperkalemia    11. Pain around toenail        Quality review:     PHQ-2 Total Score: 5 (5/13/2019 11:00 AM)  Depression Follow-up Plan: patient follow-up to return when and if necessary (5/13/2019 11:00 AM)    PHQ-9 Total Score: 16 (5/13/2019 11:00 AM)    ______________________________________________________________________     BMI Readings from Last 1 Encounters:   01/31/20 30.98 kg/m        Plan:     1. Check blood work today.  See relevant orders and diagnosis associations at the bottom of this note.   2. Treated 1 actinic keratosis (AK) and 2 seborrheic keratoses (SKs) with liquid nitrogen   3. Consider stopping lisinopril   4. Consider reducing citalopram  (Celexa).  5. Will likely need some follow up studies for hyponatremia.  6. 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  7. Follow up sooner if issues.  8. Referral to podiatry for painful toenails and difficulty cutting them and peripheral neuropathy (PN).       Jonathan Perez MD  General Internal Medicine  [facility]    Personal office fax - [phone]   Voice mail - [phone]  E-mail - [email or web address]     Return in about 6 months (around 7/31/2020), or if symptoms worsen or fail to improve, for visit and blood work.     No future appointments.      ______________________________________________________________________     Relevant ICD-10 codes and order associations:      ICD-10-CM    1. Type 2 diabetes mellitus with diabetic polyneuropathy, with long-term current use of insulin (H) E11.42 Glycosylated Hemoglobin A1c    Z79.4 Basic Metabolic Panel   2. Polyneuropathy associated with underlying disease (H) G63    3. Moderate major depression (H) F32.1    4. CKD (chronic kidney disease) stage 3, GFR 30-59 ml/min (H) N18.3    5. Hyponatremia E87.1 Cortisol   6. Essential hypertension I10    7. Tobacco abuse Z72.0    8. AK (actinic keratosis) L57.0    9. SK (seborrheic keratosis) L82.1    10. Hyperkalemia E87.5    11. Pain around toenail M79.676

## 2021-06-28 NOTE — PROGRESS NOTES
Progress Notes by Jonathan Perez MD at 2/21/2020 10:05 AM     Author: Jonathan Perez MD Service: -- Author Type: Physician    Filed: 2/23/2020 10:19 AM Encounter Date: 2/21/2020 Status: Signed    : Jonathan Perez MD (Physician)              [facility]    Comprehensive and complex medical care - Chronic disease management - Shared decision making - Care coordination - Compassionate care    Patient advocacy - Rational deprescribing - Minimally disruptive medicine - Ethical focus - Customized care          Date of Service: 2/21/2020  Primary Provider: Jonathan Perez MD    Patient Care Team:  Jonathan Perez MD as PCP - General (Internal Medicine)  Speedy Torres MD as Physician (Ophthalmology)  Jonathan Perez MD as Assigned PCP     ______________________________________________________________________     Patient's Pharmacy:    [id number] - [place], [place] - [address] [place] [address]  Phone: [phone] Fax: [phone]     Patient's Contacts:  Name Home Phone Work Phone Mobile Phone Relationship Lgl Grd   BENNYSTACY [phone] Child    JAY JASMINE   [phone] Child      Patient's Insurance:    Payor: [facility] / Plan: [facility] MEDICARE / Product Type: MEDICARE ADVANTAGE /           Rhea Jasmine is a 74 y.o. female who comes in today for:    Chief Complaint   Patient presents with   ? Follow-up       Active Problem List:  Problem List as of 2/21/2020 Reviewed: 2/2/2020  8:10 PM by Jonathan Perez MD       High    DM type 2 (diabetes mellitus, type 2) - C peptide 2.4 (2018) (H)    DNR (do not resuscitate)    Tobacco abuse       Medium    HTN (hypertension)    Moderate major depression (H)    PN (peripheral neuropathy)       Low    CKD (chronic kidney disease) stage 3, GFR 30-59 ml/min (H)    HLD (hyperlipidemia)    Low back pain radiating to left leg    OA  "(osteoarthritis)    SHIV (obstructive sleep apnea)       Unprioritized    Mixed incontinence    On angiotensin-converting enzyme (ACE) inhibitors    Tubular adenoma of colon - 2017 - Next colonoscopy 2022           Current Outpatient Medications   Medication Sig   ? acetaminophen (TYLENOL) 500 MG tablet Take 1,000 mg by mouth as needed.   ? aspirin 81 MG EC tablet Take 81 mg by mouth daily.   ? atorvastatin (LIPITOR) 40 MG tablet TAKE 1 TABLET BY MOUTH AT BEDTIME   ? BD ULTRA-FINE SHORT PEN NEEDLE 31 gauge x 5/16\" Ndle USE DAILY AS DIRECTED   ? blood glucose test strips Use 1 each As Directed 2 (two) times a day. Dispense brand per patient's insurance at pharmacy discretion.   ? cholecalciferol, vitamin D3, 1,000 unit tablet Take 1,000 Units by mouth daily.   ? citalopram (CELEXA) 40 MG tablet Take 0.5 tablets (20 mg total) by mouth daily.   ? fluticasone (FLONASE) 50 mcg/actuation nasal spray Apply 2 sprays into each nostril daily.   ? furosemide (LASIX) 40 MG tablet TAKE 1 TABLET BY MOUTH EVERY DAY   ? gabapentin (NEURONTIN) 300 MG capsule TAKE 1 CAPSULE BY MOUTH THREE TIMES DAILY   ? insulin needles, disposable, 31 Ndle Use As Directed. Use 1x/day   ? lancets (TRUEPLUS LANCETS) 28 gauge Misc Use As Directed 3 (three) times a day.   ? LANTUS SOLOSTAR U-100 INSULIN 100 unit/mL (3 mL) pen INJECT 20 UNITS SUBCUTANEOUSLY AT BEDTIME. MAY TAKE 16-20 UNITS AS DIRECTED   ? magnesium oxide (MAG-OX) 400 mg (241.3 mg magnesium) tablet TAKE 1 TABLET BY MOUTH TWICE A DAY   ? metFORMIN (GLUCOPHAGE-XR) 500 MG 24 hr tablet TAKE 4 TABLETS BY MOUTH DAILY   ? metoprolol succinate (TOPROL-XL) 50 MG 24 hr tablet TAKE 1 TABLET BY MOUTH EVERY DAY   ? oxybutynin (DITROPAN) 5 MG tablet Take 1 tablet (5 mg total) by mouth at bedtime.   ? ranitidine (ZANTAC) 75 MG tablet Take 75 mg by mouth as needed for heartburn.   ? vitamin E 400 UNIT capsule Take 400 Units by mouth daily.     Social History     Social History Narrative    Retired nursing " assistant.        Previous patient of Dr. Ghotra.        Goes to [facility] for exercise.       Subjective:     Comes in for follow up hyponatremia.  She did cut back on her citalopram (Celexa) and we reviewed this.  She is coming in for recheck of her tests.    Also had hyperkalemia.  Cortisol levels have been good.  May have some degree of RTA.  Stopped lisinopril and we will be rechecking this as well.  Also on beta blocker which could contribute.    We reviewed the patient's diabetes and it is doing okay.  No significant hypoglycemia.      Reviewed her chronic back pain and there is still issues with this and her hips.  But she does not want to pursue this any further at this time.  MRI scan from 2015 reviewed.    Her depression is doing well at this time.     Reviewed kidney disease and there has not been any issues with excessive edema.     We reviewed her other issues noted in the assessment but not specifically addressed in the HPI above.     On review of systems, the patient denies any chest pain or shortness of breath.    Objective:     Wt Readings from Last 3 Encounters:   01/31/20 193 lb 6.4 oz (87.7 kg)   05/13/19 192 lb (87.1 kg)   01/14/19 196 lb 3.2 oz (89 kg)     BP Readings from Last 3 Encounters:   02/21/20 136/74   01/31/20 136/70   05/13/19 136/80     /74   Pulse 71    The patient is comfortable, no acute distress.  Mood good.  Insight good.  Eyes are nonicteric.  Neck is supple without mass.  No cervical adenopathy.  No thyromegaly. Heart regular rate and rhythm.  Lungs clear to auscultation bilaterally.  Respiratory effort is good.  Abdomen soft and nontender.  No hepatosplenomegaly.  Extremities no edema.  Gait is slightly unstable.    Diagnostics:     Results for orders placed or performed in visit on 02/21/20   Basic Metabolic Panel   Result Value Ref Range    Sodium 129 (L) 136 - 145 mmol/L    Potassium 4.5 3.5 - 5.0 mmol/L    Chloride 93 (L) 98 - 107 mmol/L    CO2  23 22 - 31 mmol/L    Anion Gap, Calculation 13 5 - 18 mmol/L    Glucose 100 70 - 125 mg/dL    Calcium 9.3 8.5 - 10.5 mg/dL    BUN 14 8 - 28 mg/dL    Creatinine 1.09 0.60 - 1.10 mg/dL    GFR MDRD Af Amer 59 (L) >60 mL/min/1.73m2    GFR MDRD Non Af Amer 49 (L) >60 mL/min/1.73m2   Osmolality   Result Value Ref Range    Osmolality, Blood 268 (L) 270 - 300 mOsm/kg   Cortisol   Result Value Ref Range    Cortisol 11.9 ug/dL   Microalbumin, Random Urine   Result Value Ref Range    Microalbumin, Random Urine 7.20 (H) 0.00 - 1.99 mg/dL    Creatinine, Urine 35.1 mg/dL    Microalbumin/Creatinine Ratio Random Urine 205.1 (H) <=19.9 mg/g   Osmolality, Random, Urine   Result Value Ref Range    Osmolality, Urine 176 (L) 300 - 900 mOsm/kg   Sodium, Random Urine   Result Value Ref Range    Sodium, Urine <20 mmol/L       Assessment:     1. Hyponatremia    2. Type 2 diabetes mellitus with diabetic polyneuropathy, with long-term current use of insulin (H)    3. CKD (chronic kidney disease) stage 3, GFR 30-59 ml/min (H)    4. Drug-induced hyperkalemia    5. Moderate major depression (H)        Quality review:     PHQ-2 Total Score: 5 (5/13/2019 11:00 AM)  Depression Follow-up Plan: patient follow-up to return when and if necessary (5/13/2019 11:00 AM)    PHQ-9 Total Score: 16 (5/13/2019 11:00 AM)    ______________________________________________________________________     BMI Readings from Last 1 Encounters:   01/31/20 30.98 kg/m        Plan:     1. PHQ9 due at next visit.   2. Check blood work and urine tests this visit.  3. Continue off of lisinopril at this time.  4. Further advice based on tests.  5. Continue current medications.         Jonathan Perez MD  General Internal Medicine  [facility]    Personal office fax - [phone]   Voice mail - [phone]  E-mail - [email or web address]     Return in about 6 months (around 8/21/2020), or if symptoms worsen or fail to improve, for visit and blood  work.     No future appointments.      ______________________________________________________________________     Relevant ICD-10 codes and order associations:      ICD-10-CM    1. Hyponatremia E87.1 Basic Metabolic Panel     Osmolality     Cortisol     Osmolality, Random, Urine     Sodium, Random Urine   2. Type 2 diabetes mellitus with diabetic polyneuropathy, with long-term current use of insulin (H) E11.42 Microalbumin, Random Urine    Z79.4    3. CKD (chronic kidney disease) stage 3, GFR 30-59 ml/min (H) N18.3    4. Drug-induced hyperkalemia E87.5     T50.905A    5. Moderate major depression (H) F32.1

## 2021-06-29 NOTE — PROGRESS NOTES
Progress Notes by Jonathan Perez MD at 2020 10:05 AM     Author: Jonathan Perez MD Service: -- Author Type: Physician    Filed: 2020  5:00 PM Encounter Date: 2020 Status: Signed    : Jonathan Perez MD (Physician)              [facility]    Comprehensive and complex medical care - Chronic disease management - Shared decision making - Care coordination - Compassionate care    Patient advocacy - Rational deprescribing - Minimally disruptive medicine - Ethical focus - Customized care          Date of Service: 2020  Primary Provider: Jonathan Perez MD    Patient Care Team:  Jonathan Perez MD as PCP - General (Internal Medicine)  Speedy Torres MD as Physician (Ophthalmology)  Jonathan Perez MD as Assigned PCP     ______________________________________________________________________     Patient's Pharmacy:      [facility] [id number] [facility] [place] [place] [facility] [address] [facility] [address] [place] [address]  Phone: [phone] Fax: [phone]     Patient's Contacts:  Name Home Phone Work Phone Mobile Phone Relationship Lgl Grd   BENNYSTACY [phone] Child    JAY JASMINE   [phone] Child        Patient's Insurance:    Payor/Plan Subscr  Sex Relation Sub. Ins. ID Effective Group Num   1. UCARE - UCARE* RHEA JASMINE 1945 Female  [id number] Not Eff RIESAB                                   [address]   2. UCARE - UCARE* RHEA JASMINE 1945 Female Self [id number] Not Eff RIESMT                                   [address]           Rhea Jasmine is a 75 y.o. female who comes in today for:    Chief Complaint   Patient presents with   ? Diabetes   ? Urinary Incontinence     still has issues with even taking oxybutynin   ? Medication Questions     on oxybutynin but should take mybetiq   ? Medication Questions     is it okay to take a prenatal vitamins has iron in it  "      Active Problem List:  Problem List as of 8/21/2020 Reviewed: 2/23/2020 10:12 AM by Jonathan Perez MD       High    DM type 2 (diabetes mellitus, type 2) - C peptide 2.4 (2018) (H)    DNR (do not resuscitate)    Tobacco abuse       Medium    HTN (hypertension)    Moderate major depression (H)    PN (peripheral neuropathy)       Low    CKD (chronic kidney disease) stage 3, GFR 30-59 ml/min (H)    HLD (hyperlipidemia)    Low back pain radiating to left leg    OA (osteoarthritis)    SHIV (obstructive sleep apnea)       Unprioritized    Mixed incontinence    Tubular adenoma of colon - 2017 - Next colonoscopy 2022           Current Outpatient Medications   Medication Sig   ? acetaminophen (TYLENOL) 500 MG tablet Take 1,000 mg by mouth as needed.   ? aspirin 81 MG EC tablet Take 81 mg by mouth daily.   ? atorvastatin (LIPITOR) 40 MG tablet TAKE 1 TABLET BY MOUTH EVERYDAY AT BEDTIME   ? BD ULTRA-FINE SHORT PEN NEEDLE 31 gauge x 5/16\" Ndle USE DAILY AS DIRECTED   ? blood glucose test strips Use 1 each As Directed 2 (two) times a day. Dispense brand per patient's insurance at pharmacy discretion.   ? cholecalciferol, vitamin D3, 1,000 unit tablet Take 1,000 Units by mouth daily.   ? citalopram (CELEXA) 20 MG tablet Take 1 tablet (20 mg total) by mouth daily.   ? citalopram (CELEXA) 40 MG tablet Take 0.5 tablets (20 mg total) by mouth daily.   ? fluticasone (FLONASE) 50 mcg/actuation nasal spray Apply 2 sprays into each nostril daily.   ? furosemide (LASIX) 40 MG tablet TAKE 1 TABLET BY MOUTH EVERY DAY   ? gabapentin (NEURONTIN) 300 MG capsule TAKE 1 CAPSULE BY MOUTH THREE TIMES A DAY   ? insulin needles, disposable, 31 Ndle Use As Directed. Use 1x/day   ? lancets (TRUEPLUS LANCETS) 28 gauge Misc Use As Directed 3 (three) times a day.   ? LANTUS SOLOSTAR U-100 INSULIN 100 unit/mL (3 mL) pen INJECT 20 UNITS SUBCUTANEOUSLY AT BEDTIME. MAY TAKE 16-20 UNITS AS DIRECTED   ? magnesium oxide (MAG-OX) 400 mg (241.3 mg " magnesium) tablet TAKE 1 TABLET BY MOUTH TWICE A DAY   ? metFORMIN (GLUCOPHAGE-XR) 500 MG 24 hr tablet Take 4 tablets (2,000 mg total) by mouth daily.   ? metoprolol succinate (TOPROL-XL) 50 MG 24 hr tablet TAKE 1 TABLET BY MOUTH EVERY DAY   ? oxybutynin (DITROPAN) 5 MG tablet TAKE 1 TABLET BY MOUTH EVERYDAY AT BEDTIME   ? ranitidine (ZANTAC) 75 MG tablet Take 75 mg by mouth as needed for heartburn.   ? vitamin E 400 UNIT capsule Take 400 Units by mouth daily.       Social History     Social History Narrative    Retired nursing assistant.        Previous patient of Dr. Ghotra.        Goes to [facility] for exercise.         Subjective:     In for follow up multiple issues.    We reviewed the patient's diabetes and it is doing okay.  No significant hypoglycemia.  Having higher sugars in the am.  Taking 20 units of lantus at night.    Low back pain continues to bother her, but she does not want to do any further work-up for this.  No radiation down the legs at this time.  Worse when she stands or is on her feet for prolonged times.    Urinary incontinence (UI) also an issue.  Had questions about mirabegron (Myrbetriq) and we did discuss cost with her and she is not interested.    Osteoarthritis (OA) of the knees and hands continue to be an issue.    Reviewed her hypertension today.  Blood pressure has been in the goal range.  Denies any excessive dizziness from the medication with this.     We reviewed her other issues noted in the assessment but not specifically addressed in the HPI above.     On review of systems, the patient denies any chest pain or shortness of breath.  Chronic fatigue.    Objective:     Wt Readings from Last 3 Encounters:   01/31/20 193 lb 6.4 oz (87.7 kg)   05/13/19 192 lb (87.1 kg)   01/14/19 196 lb 3.2 oz (89 kg)       BP Readings from Last 3 Encounters:   08/21/20 134/78   02/21/20 136/74   01/31/20 136/70       /78   Pulse 74   SpO2 97%    The patient is comfortable,  no acute distress.  Mood good.  Insight good.  Eyes are nonicteric.  Neck is supple without mass.  No cervical adenopathy.  No thyromegaly. Heart regular rate and rhythm.  Lungs clear to auscultation bilaterally.  Respiratory effort is good.  Abdomen soft and nontender.  No hepatosplenomegaly.  Extremities no edema.  Foot exam is normal.      Diagnostics:     Results for orders placed or performed in visit on 08/21/20   Microalbumin, Random Urine   Result Value Ref Range    Microalbumin, Random Urine 8.08 (H) 0.00 - 1.99 mg/dL    Creatinine, Urine 43.2 mg/dL    Microalbumin/Creatinine Ratio Random Urine 187.0 (H) <=19.9 mg/g   Comprehensive Metabolic Panel   Result Value Ref Range    Sodium 133 (L) 136 - 145 mmol/L    Potassium 4.9 3.5 - 5.0 mmol/L    Chloride 99 98 - 107 mmol/L    CO2 23 22 - 31 mmol/L    Anion Gap, Calculation 11 5 - 18 mmol/L    Glucose 175 (H) 70 - 125 mg/dL    BUN 17 8 - 28 mg/dL    Creatinine 1.04 0.60 - 1.10 mg/dL    GFR MDRD Af Amer >60 >60 mL/min/1.73m2    GFR MDRD Non Af Amer 52 (L) >60 mL/min/1.73m2    Bilirubin, Total 0.3 0.0 - 1.0 mg/dL    Calcium 9.4 8.5 - 10.5 mg/dL    Protein, Total 6.8 6.0 - 8.0 g/dL    Albumin 4.0 3.5 - 5.0 g/dL    Alkaline Phosphatase 96 45 - 120 U/L    AST 14 0 - 40 U/L    ALT 15 0 - 45 U/L   Sodium, Random Urine   Result Value Ref Range    Sodium, Urine 22 mmol/L   Lipid Cascade RANDOM   Result Value Ref Range    Cholesterol 172 <=199 mg/dL    Triglycerides 129 <=149 mg/dL    HDL Cholesterol 55 >=50 mg/dL    LDL Calculated 91 <=129 mg/dL    Patient Fasting > 8hrs? Yes    Glycosylated Hemoglobin A1c   Result Value Ref Range    Hemoglobin A1c 7.5 (H) <=5.6 %       Assessment:     1. Type 2 diabetes mellitus with diabetic polyneuropathy, with long-term current use of insulin (H)    2. Hyponatremia    3. Chronic midline low back pain, unspecified whether sciatica present    4. Encounter for completion of form with patient    5. Essential hypertension    6. CKD  (chronic kidney disease) stage 3, GFR 30-59 ml/min (H)    7. Microalbuminuria due to type 2 diabetes mellitus (H)    8. Mixed incontinence        Quality review:     PHQ-2 Total Score: 2 (8/23/2020  4:00 PM)  Depression Follow-up Plan: patient follow-up to return when and if necessary (8/23/2020  4:00 PM)      No data recorded  ______________________________________________________________________     BMI Readings from Last 1 Encounters:   01/31/20 30.98 kg/m          Plan:     1. Check blood work today.  See relevant orders and diagnosis associations at the bottom of this note.   2. Might need to try an angiotensin receptor blocker (ARB) for proteinuria.  Will consider.  3. Diabetes form for driving done today.  4. Consider a follow up MRI scan of her back in the future.  5. Follow up sooner if issues   6. EKG next visit to get a new baseline tracing, diagnosis: hypertension.         Jonathan Perez MD  General Internal Medicine  [facility]    Personal office fax - [phone]   Voice mail - [phone]  E-mail - [email or web address]     Return in about 6 months (around 2/21/2021), or if symptoms worsen or fail to improve, for follow up visit, visit and blood work.     Future Appointments   Date Time Provider Department Center   2/23/2021 10:05 AM Jonathan Perez MD [facility]         ______________________________________________________________________     Relevant ICD-10 codes and order associations:      ICD-10-CM    1. Type 2 diabetes mellitus with diabetic polyneuropathy, with long-term current use of insulin (H)  E11.42 Microalbumin, Random Urine    Z79.4 Comprehensive Metabolic Panel     Lipid Cascade RANDOM     Glycosylated Hemoglobin A1c   2. Hyponatremia  E87.1 Sodium, Random Urine   3. Chronic midline low back pain, unspecified whether sciatica present  M54.5     G89.29    4. Encounter for completion of form with patient  Z02.89    5. Essential hypertension   I10    6. CKD (chronic kidney disease) stage 3, GFR 30-59 ml/min (H)  N18.3    7. Microalbuminuria due to type 2 diabetes mellitus (H)  E11.29     R80.9    8. Mixed incontinence  N39.46

## 2021-06-30 NOTE — PROGRESS NOTES
Progress Notes by Jonathan Perez MD at 2021 10:05 AM     Author: Jonathan Perez MD Service: -- Author Type: Physician    Filed: 2021  9:12 AM Encounter Date: 2021 Status: Signed    : Jonathan Perez MD (Physician)              [facility]    Comprehensive and complex medical care - Chronic disease management - Shared decision making - Care coordination - Compassionate care    Patient advocacy - Rational deprescribing - Minimally disruptive medicine - Ethical focus - Customized care          Date of Service: 2021  Primary Provider: Jonathan Perez MD    Patient Care Team:  Jonathan Perez MD as PCP - General (Internal Medicine)  Speedy Torres MD as Physician (Ophthalmology)  Jonathan Perez MD as Assigned PCP     ______________________________________________________________________     Patient's Pharmacy:    [facility] [id number] [facility] [place] [place] [facility] [address] [facility] [address] [place] [address] [address] [place] [address]  Phone: [phone] Fax: [phone]     Patient's Contacts:  Name Home Phone Work Phone Mobile Phone Relationship Lgl Grd   BENNY,STACY [phone] Child    JAY JASMINE   [phone] Child      Patient's Insurance:    Payor/Plan Subscr  Sex Relation Sub. Ins. ID Effective Group Num   1. UCARE - UCARE* RHEA JASMINE 1945 Female  [id number] Not Eff RIESAB                                   [address]   2. UCARE - UCARE* RHEA JASMINE 1945 Female  [id number] Not Eff RIESMT                                   [address]           Rhea Jasmine is a 75 y.o. female who comes in today for:    Chief Complaint   Patient presents with   ? Diabetes   ? Follow-up     low back pain, osteoartritis, HTN       Active Problem List:  Problem List as of 2021 Reviewed: 2020  4:54 PM by Jonathan Perez MD       High    DM type 2 (diabetes mellitus, type 2) - C peptide 2.4 (2018)  (H)    DNR (do not resuscitate)    Tobacco abuse       Medium    HTN (hypertension)    Moderate major depression (H)    PN (peripheral neuropathy)       Low    CKD (chronic kidney disease) stage 3, GFR 30-59 ml/min    HLD (hyperlipidemia)    Low back pain radiating to left leg    OA (osteoarthritis)    SHIV (obstructive sleep apnea)       Unprioritized    Microalbuminuria due to type 2 diabetes mellitus (H)    Mixed incontinence    Polyneuropathy associated with underlying disease (H)    Tubular adenoma of colon - 2017 - Next colonoscopy 2022           Current Outpatient Medications   Medication Sig   ? acetaminophen (TYLENOL) 500 MG tablet Take 1,000 mg by mouth as needed.   ? aspirin 81 MG EC tablet Take 81 mg by mouth daily.   ? atorvastatin (LIPITOR) 40 MG tablet TAKE 1 TABLET BY MOUTH EVERYDAY AT BEDTIME   ? blood glucose test strips Use 1 each As Directed 2 (two) times a day. Dispense brand per patient's insurance at pharmacy discretion.   ? cholecalciferol, vitamin D3, 1,000 unit tablet Take 1,000 Units by mouth daily.   ? citalopram (CELEXA) 20 MG tablet TAKE 1 TABLET BY MOUTH EVERY DAY   ? fluticasone (FLONASE) 50 mcg/actuation nasal spray Apply 2 sprays into each nostril daily.   ? furosemide (LASIX) 40 MG tablet TAKE 1 TABLET BY MOUTH EVERY DAY   ? gabapentin (NEURONTIN) 300 MG capsule TAKE 1 CAPSULE BY MOUTH THREE TIMES A DAY   ? insulin glargine (LANTUS SOLOSTAR U-100 INSULIN) 100 unit/mL (3 mL) pen INJECT 20 UNITS SUBCUTANEOUSLY AT BEDTIME. MAY TAKE 16-20 UNITS AS DIRECTED   ? insulin needles, disposable, 31 Ndle Use As Directed. Use 1x/day   ? lancets (TRUEPLUS LANCETS) 28 gauge Misc Use As Directed 3 (three) times a day.   ? losartan (COZAAR) 50 MG tablet Take 1 tablet (50 mg total) by mouth daily.   ? magnesium oxide (MAG-OX) 400 mg (241.3 mg magnesium) tablet Take 1 tablet (400 mg total) by mouth 2 (two) times a day.   ? metFORMIN (GLUCOPHAGE-XR) 500 MG 24 hr tablet Take 4 tablets (2,000 mg total)  "by mouth daily.   ? oxybutynin (DITROPAN) 5 MG tablet TAKE 1 TABLET BY MOUTH EVERYDAY AT BEDTIME   ? pen needle, diabetic (BD ULTRA-FINE SHORT PEN NEEDLE) 31 gauge x 5/16\" Ndle USE DAILY AS DIRECTED   ? vitamin E 400 UNIT capsule Take 400 Units by mouth daily.   ? metoprolol succinate (TOPROL-XL) 50 MG 24 hr tablet Take 1 tablet (50 mg total) by mouth daily.       Social History     Social History Narrative    Retired nursing assistant.        Previous patient of Dr. Ghotra.        Goes to [facility] for exercise.       Subjective:     Patient comes in today for follow-up of a number of issues.    We first reviewed her diabetes.  She has lost a little bit of weight.  She is continuing to try to work on this.  She continues to smoke and is not interested in stopping.    Reviewed the coronavirus vaccine and she is a candidate for this.  We gave her the information to schedule this if she wishes.    We reviewed her high blood pressure.  Her blood pressure has been running higher.  She is off of metoprolol when we started the losartan.  I think we will likely restart the metoprolol again to see if this helps her.  It did not help with her fatigue to stop this.    She has had some dental issues recently.    She does sleep a lot and ache all over, but this has not changed significantly over time.    We reviewed her other issues noted in the assessment but not specifically addressed in the HPI above.     Objective:     Wt Readings from Last 3 Encounters:   02/23/21 189 lb 14.4 oz (86.1 kg)   01/31/20 193 lb 6.4 oz (87.7 kg)   05/13/19 192 lb (87.1 kg)     BP Readings from Last 3 Encounters:   02/25/21 136/76   08/21/20 134/78   02/21/20 136/74     /76   Pulse 76   Ht 5' 6\" (1.676 m)   Wt 189 lb 14.4 oz (86.1 kg)   SpO2 98%   BMI 30.65 kg/m     The patient is comfortable, no acute distress.  Mood somewhat flat.  Insight good.  Eyes are nonicteric.  Neck is supple without mass.  No cervical " adenopathy.  No thyromegaly. Heart regular rate and rhythm.  Lungs clear to auscultation bilaterally.  Respiratory effort is good.  Abdomen soft and nontender.  No hepatosplenomegaly.  Extremities no edema.  Foot exam is good.      Diagnostics:     Results for orders placed or performed in visit on 02/23/21   Glycosylated Hemoglobin A1c   Result Value Ref Range    Hemoglobin A1c 6.7 (H) <=5.6 %   Basic Metabolic Panel   Result Value Ref Range    Sodium 132 (L) 136 - 145 mmol/L    Potassium 4.9 3.5 - 5.0 mmol/L    Chloride 95 (L) 98 - 107 mmol/L    CO2 28 22 - 31 mmol/L    Anion Gap, Calculation 9 5 - 18 mmol/L    Glucose 117 70 - 125 mg/dL    Calcium 9.3 8.5 - 10.5 mg/dL    BUN 14 8 - 28 mg/dL    Creatinine 1.03 0.60 - 1.10 mg/dL    GFR MDRD Af Amer >60 >60 mL/min/1.73m2    GFR MDRD Non Af Amer 52 (L) >60 mL/min/1.73m2   Electrocardiogram Perform and Read   Result Value Ref Range    SYSTOLIC BLOOD PRESSURE      DIASTOLIC BLOOD PRESSURE      VENTRICULAR RATE 73 BPM    ATRIAL RATE 73 BPM    P-R INTERVAL 146 ms    QRS DURATION 76 ms    Q-T INTERVAL 388 ms    QTC CALCULATION (BEZET) 427 ms    P Axis 57 degrees    R AXIS 61 degrees    T AXIS 71 degrees    MUSE DIAGNOSIS       Normal sinus rhythm  Normal ECG  When compared with ECG of 08-JUL-2008 18:30,  No significant change was found  Confirmed by DERIC RAMOS, ATUL LOC:WW ([id number]) on 2/23/2021 11:27:51 AM         Quality review:     PHQ-2 Total Score: 2 (8/23/2020  4:00 PM)  Depression Follow-up Plan: patient follow-up to return when and if necessary (8/23/2020  4:00 PM)    No data recorded  ______________________________________________________________________     BMI Readings from Last 1 Encounters:   02/23/21 30.65 kg/m        Assessment and Plan:     1. Essential hypertension  We did a baseline EKG today to have on file.  We are likely going to add back in the metoprolol and follow-up.    - Electrocardiogram Perform and Read  - Basic Metabolic Panel    2. Type 2  diabetes mellitus with diabetic polyneuropathy, with long-term current use of insulin (H)  Currently doing okay at this time.  Follow-up sooner if issues.    - blood glucose test strips; Use 1 each As Directed 2 (two) times a day. Dispense brand per patient's insurance at pharmacy discretion.  Dispense: 200 strip; Refill: 3  - Glycosylated Hemoglobin A1c    3. Lumbar radicular pain  No current issues at this point.    - gabapentin (NEURONTIN) 300 MG capsule; TAKE 1 CAPSULE BY MOUTH THREE TIMES A DAY  Dispense: 270 capsule; Refill: 3    4. Mixed hyperlipidemia    - atorvastatin (LIPITOR) 40 MG tablet; TAKE 1 TABLET BY MOUTH EVERYDAY AT BEDTIME  Dispense: 90 tablet; Refill: 3    5. Polyneuropathy associated with underlying disease (H)  Continue gabapentin for this as well.    6. Moderate major depression (H)  Needs follow-up PHQ-9 at next visit.    7. Stage 3a chronic kidney disease  Stable at this time and monitor.    8. On angiotensin receptor blockers (ARB)  Given due to proteinuria.          Jonathan Perez MD  General Internal Medicine  [facility]      Return in about 6 months (around 8/23/2021), or if symptoms worsen or fail to improve, for visit and blood work.     No future appointments.      HCC issues resolved at this visit.

## 2021-07-03 NOTE — ADDENDUM NOTE
Addendum Note by Mayuri Beltran MD at 1/31/2020 10:55 AM     Author: Mayuri Beltran MD Service: -- Author Type: Physician    Filed: 2/2/2020  8:20 PM Encounter Date: 1/31/2020 Status: Signed    : Mayuri Beltran MD (Physician)    Addended by: MAYURI BELTRAN on: 2/2/2020 08:20 PM        Modules accepted: Orders

## 2021-07-04 NOTE — LETTER
Letter by Jonathan Perez MD at      Author: Jonathan Perez MD Service: -- Author Type: --    Filed:  Encounter Date: 5/31/2021 Status: (Other)         Rhea Jasmine   [address]  [place] [address]         Dear Rhea,    I am sending you this letter to remind you that you are due for a follow up visit in August or September.    Please call to schedule this visit as soon as possible as our schedule fills up quickly.    If you already have an appointment scheduled with me for around this time, please ignore this notification.    I look forward to seeing you soon.      If you have any questions regarding the above, please feel free to contact me at [phone].        Sincerely,       Jonathan Perez MD  General Internal Medicine  [facility]

## 2021-07-13 ENCOUNTER — RECORDS - HEALTHEAST (OUTPATIENT)
Dept: ADMINISTRATIVE | Facility: CLINIC | Age: 76
End: 2021-07-13

## 2021-07-21 ENCOUNTER — RECORDS - HEALTHEAST (OUTPATIENT)
Dept: ADMINISTRATIVE | Facility: CLINIC | Age: 76
End: 2021-07-21

## 2021-07-21 DIAGNOSIS — E11.42 TYPE 2 DIABETES MELLITUS WITH DIABETIC POLYNEUROPATHY, WITH LONG-TERM CURRENT USE OF INSULIN (H): ICD-10-CM

## 2021-07-21 DIAGNOSIS — Z79.4 TYPE 2 DIABETES MELLITUS WITH DIABETIC POLYNEUROPATHY, WITH LONG-TERM CURRENT USE OF INSULIN (H): ICD-10-CM

## 2021-07-21 RX ORDER — METFORMIN HCL 500 MG
TABLET, EXTENDED RELEASE 24 HR ORAL
Qty: 360 TABLET | Refills: 0 | Status: SHIPPED | OUTPATIENT
Start: 2021-07-21 | End: 2021-10-25

## 2021-07-23 NOTE — TELEPHONE ENCOUNTER
Please contact the patient and have her schedule an appointment with me in September.     Jonathan Perez MD   General Internal Medicine   [facility]   7/21/2021, 10:25 PM

## 2021-10-19 PROBLEM — F32.9 MAJOR DEPRESSION: Status: ACTIVE | Noted: 2019-01-14

## 2021-10-24 DIAGNOSIS — E11.42 TYPE 2 DIABETES MELLITUS WITH DIABETIC POLYNEUROPATHY, WITH LONG-TERM CURRENT USE OF INSULIN (H): ICD-10-CM

## 2021-10-24 DIAGNOSIS — Z79.4 TYPE 2 DIABETES MELLITUS WITH DIABETIC POLYNEUROPATHY, WITH LONG-TERM CURRENT USE OF INSULIN (H): ICD-10-CM

## 2021-10-25 RX ORDER — METFORMIN HCL 500 MG
2000 TABLET, EXTENDED RELEASE 24 HR ORAL DAILY
Qty: 180 TABLET | Refills: 0 | Status: SHIPPED | OUTPATIENT
Start: 2021-10-25 | End: 2021-12-05

## 2021-11-29 PROBLEM — F32.9 MAJOR DEPRESSION: Chronic | Status: ACTIVE | Noted: 2019-01-14

## 2021-11-30 ENCOUNTER — OFFICE VISIT (OUTPATIENT)
Dept: INTERNAL MEDICINE | Facility: CLINIC | Age: 76
End: 2021-11-30
Payer: COMMERCIAL

## 2021-11-30 DIAGNOSIS — G47.33 OSA (OBSTRUCTIVE SLEEP APNEA): ICD-10-CM

## 2021-11-30 DIAGNOSIS — I10 PRIMARY HYPERTENSION: ICD-10-CM

## 2021-11-30 DIAGNOSIS — E11.42 TYPE 2 DIABETES MELLITUS WITH DIABETIC POLYNEUROPATHY, WITH LONG-TERM CURRENT USE OF INSULIN (H): Primary | ICD-10-CM

## 2021-11-30 DIAGNOSIS — N18.31 STAGE 3A CHRONIC KIDNEY DISEASE (H): ICD-10-CM

## 2021-11-30 DIAGNOSIS — Z72.0 TOBACCO ABUSE: Chronic | ICD-10-CM

## 2021-11-30 DIAGNOSIS — G63 POLYNEUROPATHY ASSOCIATED WITH UNDERLYING DISEASE (H): ICD-10-CM

## 2021-11-30 DIAGNOSIS — Z79.4 TYPE 2 DIABETES MELLITUS WITH DIABETIC POLYNEUROPATHY, WITH LONG-TERM CURRENT USE OF INSULIN (H): Primary | ICD-10-CM

## 2021-11-30 LAB
ALBUMIN UR-MCNC: ABNORMAL MG/DL
ANION GAP SERPL CALCULATED.3IONS-SCNC: 14 MMOL/L (ref 5–18)
APPEARANCE UR: CLEAR
BACTERIA #/AREA URNS HPF: ABNORMAL /HPF
BASOPHILS # BLD AUTO: 0.1 10E3/UL (ref 0–0.2)
BASOPHILS NFR BLD AUTO: 1 %
BILIRUB UR QL STRIP: NEGATIVE
BUN SERPL-MCNC: 20 MG/DL (ref 8–28)
CALCIUM SERPL-MCNC: 9.6 MG/DL (ref 8.5–10.5)
CHLORIDE BLD-SCNC: 96 MMOL/L (ref 98–107)
CO2 SERPL-SCNC: 22 MMOL/L (ref 22–31)
COLOR UR AUTO: YELLOW
CREAT SERPL-MCNC: 1.07 MG/DL (ref 0.6–1.1)
CREAT UR-MCNC: 40 MG/DL
EOSINOPHIL # BLD AUTO: 0.3 10E3/UL (ref 0–0.7)
EOSINOPHIL NFR BLD AUTO: 3 %
ERYTHROCYTE [DISTWIDTH] IN BLOOD BY AUTOMATED COUNT: 13.3 % (ref 10–15)
GFR SERPL CREATININE-BSD FRML MDRD: 51 ML/MIN/1.73M2
GLUCOSE BLD-MCNC: 85 MG/DL (ref 70–125)
GLUCOSE UR STRIP-MCNC: NEGATIVE MG/DL
HBA1C MFR BLD: 6.2 % (ref 0–5.6)
HCT VFR BLD AUTO: 34.3 % (ref 35–47)
HGB BLD-MCNC: 11.7 G/DL (ref 11.7–15.7)
HGB UR QL STRIP: ABNORMAL
IMM GRANULOCYTES # BLD: 0 10E3/UL
IMM GRANULOCYTES NFR BLD: 0 %
KETONES UR STRIP-MCNC: NEGATIVE MG/DL
LEUKOCYTE ESTERASE UR QL STRIP: NEGATIVE
LYMPHOCYTES # BLD AUTO: 1.8 10E3/UL (ref 0.8–5.3)
LYMPHOCYTES NFR BLD AUTO: 23 %
MCH RBC QN AUTO: 31.5 PG (ref 26.5–33)
MCHC RBC AUTO-ENTMCNC: 34.1 G/DL (ref 31.5–36.5)
MCV RBC AUTO: 93 FL (ref 78–100)
MICROALBUMIN UR-MCNC: 9.56 MG/DL (ref 0–1.99)
MICROALBUMIN/CREAT UR: 239 MG/G CR
MONOCYTES # BLD AUTO: 0.7 10E3/UL (ref 0–1.3)
MONOCYTES NFR BLD AUTO: 9 %
NEUTROPHILS # BLD AUTO: 5.1 10E3/UL (ref 1.6–8.3)
NEUTROPHILS NFR BLD AUTO: 64 %
NITRATE UR QL: NEGATIVE
PH UR STRIP: 6.5 [PH] (ref 5–8)
PLATELET # BLD AUTO: 299 10E3/UL (ref 150–450)
POTASSIUM BLD-SCNC: 5.2 MMOL/L (ref 3.5–5)
RBC # BLD AUTO: 3.71 10E6/UL (ref 3.8–5.2)
RBC #/AREA URNS AUTO: ABNORMAL /HPF
SODIUM SERPL-SCNC: 132 MMOL/L (ref 136–145)
SP GR UR STRIP: 1.01 (ref 1–1.03)
SQUAMOUS #/AREA URNS AUTO: ABNORMAL /LPF
UROBILINOGEN UR STRIP-ACNC: 0.2 E.U./DL
WBC # BLD AUTO: 8 10E3/UL (ref 4–11)
WBC #/AREA URNS AUTO: ABNORMAL /HPF

## 2021-11-30 PROCEDURE — 85025 COMPLETE CBC W/AUTO DIFF WBC: CPT | Performed by: INTERNAL MEDICINE

## 2021-11-30 PROCEDURE — 83036 HEMOGLOBIN GLYCOSYLATED A1C: CPT | Performed by: INTERNAL MEDICINE

## 2021-11-30 PROCEDURE — 99214 OFFICE O/P EST MOD 30 MIN: CPT | Performed by: INTERNAL MEDICINE

## 2021-11-30 PROCEDURE — 36415 COLL VENOUS BLD VENIPUNCTURE: CPT | Performed by: INTERNAL MEDICINE

## 2021-11-30 PROCEDURE — 82043 UR ALBUMIN QUANTITATIVE: CPT | Performed by: INTERNAL MEDICINE

## 2021-11-30 PROCEDURE — 80048 BASIC METABOLIC PNL TOTAL CA: CPT | Performed by: INTERNAL MEDICINE

## 2021-11-30 PROCEDURE — 81001 URINALYSIS AUTO W/SCOPE: CPT | Performed by: INTERNAL MEDICINE

## 2021-11-30 ASSESSMENT — PATIENT HEALTH QUESTIONNAIRE - PHQ9
SUM OF ALL RESPONSES TO PHQ QUESTIONS 1-9: 11
10. IF YOU CHECKED OFF ANY PROBLEMS, HOW DIFFICULT HAVE THESE PROBLEMS MADE IT FOR YOU TO DO YOUR WORK, TAKE CARE OF THINGS AT HOME, OR GET ALONG WITH OTHER PEOPLE: SOMEWHAT DIFFICULT
SUM OF ALL RESPONSES TO PHQ QUESTIONS 1-9: 11

## 2021-11-30 NOTE — PROGRESS NOTES
[facility]    Comprehensive and complex medical care - Chronic disease management - Shared decision making - Care coordination - Compassionate care    Patient advocacy - Rational deprescribing - Minimally disruptive medicine - Ethical focus - Customized care         Date of Service: 11/30/2021  Primary Provider: Jonathan Perez    Patient Care Team:  Jonathan Perez MD as PCP - General  Jonathan Perez MD as Assigned PCP  Speedy Torres MD as Jim Mims Dpm, SINGHM as MD          Patient's Pharmacy:    [facility] [id number] [facility] - [place], [place] - [address]  [address]  [place] [address]  Phone: [phone] Fax: [phone]     Patient's Contacts:  Name Home Phone Work Phone Mobile Phone Relationship Lgl Grd   STACY ZAPATA [phone] Other    JAY ZAPATA   [phone] Other      Patient's Insurance:    Payor: [employer] / Plan: [employer] MEDICARE SUPPLEMENT / Product Type: HMO /           Active Problem List:  Problem List as of 11/30/2021 Never Reviewed       High    Tobacco abuse    DM type 2 (diabetes mellitus, type 2) - C peptide 2.4 (2018) (H)    DNR (do not resuscitate)       Medium    HTN (hypertension)    Moderate major depression (H)    PN (peripheral neuropathy)    SHIV (obstructive sleep apnea)       Low    CKD (chronic kidney disease) stage 3, GFR 30-59 ml/min (H)    HLD (hyperlipidemia)    OA (osteoarthritis)    Low back pain radiating to left leg    Tubular adenoma of colon - 2017 - single 9 mm    Mixed incontinence    Microalbuminuria due to type 2 diabetes mellitus (H)    Polyneuropathy associated with underlying disease (H)    On angiotensin receptor blockers (ARB)           Current Outpatient Medications   Medication Instructions     acetaminophen (TYLENOL) 1,000 mg, PRN     aspirin (ASA) 81 mg, DAILY     atorvastatin (LIPITOR) 40 MG tablet [ATORVASTATIN (LIPITOR) 40 MG TABLET] TAKE 1 TABLET BY  "MOUTH EVERYDAY AT BEDTIME     blood glucose test strips 1 each, Does not apply, 2 TIMES DAILY     cholecalciferol 1,000 Units, DAILY     citalopram (CELEXA) 20 MG tablet [CITALOPRAM (CELEXA) 20 MG TABLET] TAKE 1 TABLET BY MOUTH EVERY DAY     fluticasone (FLONASE) 50 mcg/actuation nasal spray 2 sprays, DAILY     furosemide (LASIX) 40 mg, Oral, DAILY     gabapentin (NEURONTIN) 300 MG capsule [GABAPENTIN (NEURONTIN) 300 MG CAPSULE] TAKE 1 CAPSULE BY MOUTH THREE TIMES A DAY     insulin glargine (LANTUS SOLOSTAR) 20 Units, Subcutaneous, AT BEDTIME, DUE FOR FOLLOW UP IN November.  Schedule appointment now!     insulin needles, disposable, 31 Ndle [INSULIN NEEDLES, DISPOSABLE, 31 NDLE] Use As Directed. Use 1x/day     lancets (TRUEPLUS LANCETS) 28 gauge Misc 3 TIMES DAILY     losartan (COZAAR) 50 MG tablet TAKE 1 TABLET BY MOUTH EVERY DAY     magnesium oxide (MAG-OX) 400 mg (241.3 mg magnesium) tablet 1 tablet, Oral, 2 TIMES DAILY     metFORMIN (GLUCOPHAGE-XR) 2,000 mg, Oral, DAILY, OVERDUE FOR VISIT.  Call as soon as possible for appointment in December.     metoprolol succinate ER (TOPROL-XL) 50 mg, Oral, DAILY     oxybutynin (DITROPAN) 5 MG tablet [OXYBUTYNIN (DITROPAN) 5 MG TABLET] TAKE 1 TABLET BY MOUTH EVERYDAY AT BEDTIME     pen needle, diabetic (BD ULTRA-FINE SHORT PEN NEEDLE) 31 gauge x 5/16\" Ndle [PEN NEEDLE, DIABETIC (BD ULTRA-FINE SHORT PEN NEEDLE) 31 GAUGE X 5/16\" NDLE] USE DAILY AS DIRECTED     vitamin E (TOCOPHEROL) 400 Units, DAILY     Social History     Social History Narrative    Retired nursing assistant.    Previous patient of Dr. Ghotra.    Goes to [facility] [facility] [employer] [facility] for exercise.         Subjective:     Rhea Jasmine is a 76 year old female who comes in today for:    Chief Complaint   Patient presents with     Diabetes     Counseling     cpap was recalled what should she do     Lab Only     would like kidneys checked      Patient comes in today for follow-up of a number of issues.    We " first reviewed her blood pressure.  Her blood pressure is running high.  We reviewed this with her.  She has no other symptoms.  We talked about maybe increasing her blood pressure regimen at this time.    We reviewed her sleep apnea.  Her CPAP was recalled.  She continued to use it, but it gave out at the end of August.  She is having troubles through [employer] getting this new machine.  We have very limited records due to the change/consolidation of the EMR system.  Her previous sleep study from 2013 or around here is not accessible.  She might need to follow-up with sleep medicine related to this and was willing to do this at this point.  She would be okay with doing a phone visit for this.    We reviewed her diabetes and she continues to work on this.    She has chronic fatigue and continues to have issues with this.    Reviewed Covid booster which could be done December 14 or after.  We gave her information for scheduling this.    We reviewed her other issues noted in the assessment but not specifically addressed in the HPI above.     Objective:     Wt Readings from Last 3 Encounters:   11/30/21 84 kg (185 lb 3.2 oz)   02/23/21 86.1 kg (189 lb 14.4 oz)   01/31/20 87.7 kg (193 lb 6.4 oz)     BP Readings from Last 3 Encounters:   12/02/21 (!) 168/70   02/25/21 136/76   08/21/20 134/78     BP Readings from Last 20 Encounters:   12/02/21 (!) 168/70   02/25/21 136/76   08/21/20 134/78   02/21/20 136/74   01/31/20 136/70     BP (!) 168/70   Pulse 76   Wt 84 kg (185 lb 3.2 oz)   SpO2 96%   BMI 29.89 kg/m     The patient is comfortable, no acute distress.  Mood good.  Insight good.  Eyes are nonicteric.  Neck is supple without mass.  No cervical adenopathy.  No thyromegaly. Heart regular rate and rhythm.  Lungs clear to auscultation bilaterally.  Respiratory effort is good.  Abdomen soft and nontender.  No hepatosplenomegaly.  Extremities no edema.      Diagnostics:     Office Visit - [facility] on 02/23/2021    Component Date Value Ref Range Status     Sodium 02/23/2021 132* 136 - 145 mmol/L Final     Potassium 02/23/2021 4.9  3.5 - 5.0 mmol/L Final     Chloride 02/23/2021 95* 98 - 107 mmol/L Final     Carbon Dioxide (CO2) 02/23/2021 28  22 - 31 mmol/L Final     Anion Gap 02/23/2021 9  5 - 18 mmol/L Final     Glucose 02/23/2021 117  70 - 125 mg/dL Final     Calcium 02/23/2021 9.3  8.5 - 10.5 mg/dL Final     Urea Nitrogen 02/23/2021 14  8 - 28 mg/dL Final     Creatinine 02/23/2021 1.03  0.60 - 1.10 mg/dL Final     GFR Estimate If Black 02/23/2021 >60  >60 mL/min/1.73m2 Final     GFR Estimate 02/23/2021 52* >60 mL/min/1.73m2 Final     Ventricular Rate 02/23/2021 73  BPM Final     Atrial Rate 02/23/2021 73  BPM Final     AL Interval 02/23/2021 146  ms Final     QRS Duration 02/23/2021 76  ms Final     QT 02/23/2021 388  ms Final     QTc 02/23/2021 427  ms Final     P Axis 02/23/2021 57  degrees Final     R AXIS 02/23/2021 61  degrees Final     T Axis 02/23/2021 71  degrees Final     Interpretation ECG 02/23/2021    Final                    Value:Normal sinus rhythm  Normal ECG  When compared with ECG of 08-JUL-2008 18:30,  No significant change was found  Confirmed by DERIC RAMOS, ATUL LOC:WW ([id number]) on 2/23/2021 11:27:51 AM       Hemoglobin A1C 02/23/2021 6.7* <=5.6 % Final     ______________________________________________________________________     PHQ-9 score:    PHQ 11/30/2021   PHQ-9 Total Score 11   Q9: Thoughts of better off dead/self-harm past 2 weeks Not at all   ______________________________________________________________________     Assessment:     1. Type 2 diabetes mellitus with diabetic polyneuropathy, with long-term current use of insulin (H)    2. Primary hypertension    3. SHIV (obstructive sleep apnea)    4. Tobacco abuse    5. Polyneuropathy associated with underlying disease (H)    6. Stage 3a chronic kidney disease (H)        Plan:     1. Blood work done today as well as urine tests.  2. Likely  will need to escalate blood pressure management.  3. Could consider changing diuretic to torsemide in the future as this is usually better at controlling blood pressure.  4. Likely will add amlodipine at this point.  5. Referral to sleep medicine.  Perhaps they know how to mine the old sleep study report.  She will likely need new orders for CPAP.  6. Continue current medications otherwise.  7. Follow up sooner if issues.       Jonathan Perez MD  General Internal Medicine  [facility]    Return in about 4 months (around 3/30/2022), or if symptoms worsen or fail to improve, for visit and blood work.     No future appointments.

## 2021-11-30 NOTE — PATIENT INSTRUCTIONS
We are scheduling all people age 5 and older for COVID-19 vaccines (patients age 5-17 can only receive the Pfizer vaccine).    We are offering third doses of the Pfizer and Moderna COVID-19 vaccines to moderately and severely immunocompromised patients.     [facility] is offering booster doses of Covid-19 vaccination to anyone age 18 and up who got the Jose and Jose vaccine 2 or more months ago or Moderna COVID-19 vaccine or Pfizer COVID-19 vaccine 6 months or more ago.    If your initial vaccination was Jose & Jose, we recommend getting a Pfizer or Moderna second dose to maximize immunity.  If you received Moderna or Pfizer, you can schedule either Moderna or Pfizer for your booster dose based on convenience or personal preference.  To schedule any COVID-19 vaccination appointment for Moderna or Pfizer, please log in to [employer] using this using this link to see when and where we have openings.  Jose & Jose is only offered at our retail pharmacies (and they also offer Moderna and Pfizer) - please call your local [facility] to schedule with them.    If you have technical difficulty using [employer], call [phone], option [phone] for assistance.    More information about vaccine effectiveness at reducing spread of disease, hospitalizations, and death as well as vaccine safety and answers to other questions can be found on our website: [email or web address].

## 2021-12-01 ASSESSMENT — PATIENT HEALTH QUESTIONNAIRE - PHQ9: SUM OF ALL RESPONSES TO PHQ QUESTIONS 1-9: 11

## 2021-12-02 ENCOUNTER — TELEPHONE (OUTPATIENT)
Dept: INTERNAL MEDICINE | Facility: CLINIC | Age: 76
End: 2021-12-02
Payer: COMMERCIAL

## 2021-12-02 VITALS
SYSTOLIC BLOOD PRESSURE: 168 MMHG | HEART RATE: 76 BPM | DIASTOLIC BLOOD PRESSURE: 70 MMHG | WEIGHT: 185.2 LBS | BODY MASS INDEX: 29.89 KG/M2 | OXYGEN SATURATION: 96 %

## 2021-12-02 NOTE — TELEPHONE ENCOUNTER
Please call patient -    ______________________________________________________________________     Home phone:  [phone] (home)     Cell phone:   Telephone Information:   Mobile [phone]       Other contacts:  Name Home Phone Work Phone Mobile Phone Relationship Lgl Grd   STACY ZAPATA [phone] Other    JAY ZAPATA   [phone] Other      ______________________________________________________________________     Her blood work shows that her protein in her urine is slightly elevated still.  This is likely in part related to her blood pressure.    Her electrolytes are overall stable.  Her A1c is doing well at 6.2.    She is not anemic and her white count is good.    I would recommend increasing her blood pressure regimen to help protect her kidneys and prevent stroke or heart issues.    I would recommend adding a low dose of an additional medication called amlodipine 2.5 mg a day.    It might be good to have her either come in the clinic in a month to have the blood pressure rechecked.  She would need a nurse only visit if she agrees.    She also could follow her blood pressure if needed.  See if she has a cuff.    I think it would be good to see her at the end of March or early April to follow-up on this.    Note back to me for amlodipine prescription if she agrees.      Jonathan Perez MD  [facility]  12/2/2021, 8:51 AM     ______________________________________________________________________    Recent Results (from the past 240 hour(s))   Basic metabolic panel    Collection Time: 11/30/21  2:24 PM   Result Value Ref Range    Sodium 132 (L) 136 - 145 mmol/L    Potassium 5.2 (H) 3.5 - 5.0 mmol/L    Chloride 96 (L) 98 - 107 mmol/L    Carbon Dioxide (CO2) 22 22 - 31 mmol/L    Anion Gap 14 5 - 18 mmol/L    Urea Nitrogen 20 8 - 28 mg/dL    Creatinine 1.07 0.60 - 1.10 mg/dL    Calcium 9.6 8.5 - 10.5 mg/dL    Glucose 85 70 - 125 mg/dL    GFR Estimate 51 (L) >60  mL/min/1.73m2   Hemoglobin A1c    Collection Time: 11/30/21  2:24 PM   Result Value Ref Range    Hemoglobin A1C 6.2 (H) 0.0 - 5.6 %   CBC with platelets and differential    Collection Time: 11/30/21  2:24 PM   Result Value Ref Range    WBC Count 8.0 4.0 - 11.0 10e3/uL    RBC Count 3.71 (L) 3.80 - 5.20 10e6/uL    Hemoglobin 11.7 11.7 - 15.7 g/dL    Hematocrit 34.3 (L) 35.0 - 47.0 %    MCV 93 78 - 100 fL    MCH 31.5 26.5 - 33.0 pg    MCHC 34.1 31.5 - 36.5 g/dL    RDW 13.3 10.0 - 15.0 %    Platelet Count 299 150 - 450 10e3/uL    % Neutrophils 64 %    % Lymphocytes 23 %    % Monocytes 9 %    % Eosinophils 3 %    % Basophils 1 %    % Immature Granulocytes 0 %    Absolute Neutrophils 5.1 1.6 - 8.3 10e3/uL    Absolute Lymphocytes 1.8 0.8 - 5.3 10e3/uL    Absolute Monocytes 0.7 0.0 - 1.3 10e3/uL    Absolute Eosinophils 0.3 0.0 - 0.7 10e3/uL    Absolute Basophils 0.1 0.0 - 0.2 10e3/uL    Absolute Immature Granulocytes 0.0 <=0.4 10e3/uL   Albumin Random Urine Quantitative with Creat Ratio    Collection Time: 11/30/21  2:50 PM   Result Value Ref Range    Microalbumin Urine mg/dL 9.56 (H) 0.00 - 1.99 mg/dL    Creatinine Urine mg/dL 40 mg/dL    Microalbumin Urine mg/g Cr 239.0 (H) <=19.9 mg/g Cr   UA with Microscopic - lab collect    Collection Time: 11/30/21  2:50 PM   Result Value Ref Range    Color Urine Yellow Colorless, Straw, Light Yellow, Yellow    Appearance Urine Clear Clear    Glucose Urine Negative Negative mg/dL    Bilirubin Urine Negative Negative    Ketones Urine Negative Negative mg/dL    Specific Gravity Urine 1.010 1.005 - 1.030    Blood Urine Trace (A) Negative    pH Urine 6.5 5.0 - 8.0    Protein Albumin Urine Trace (A) Negative mg/dL    Urobilinogen Urine 0.2 0.2, 1.0 E.U./dL    Nitrite Urine Negative Negative    Leukocyte Esterase Urine Negative Negative   Urine Microscopic Exam    Collection Time: 11/30/21  2:50 PM   Result Value Ref Range    Bacteria Urine None Seen None Seen /HPF    RBC Urine 0-2 0-2 /HPF  /HPF    WBC Urine None Seen 0-5 /HPF /HPF    Squamous Epithelials Urine Few (A) None Seen /LPF      ______________________________________________________________________

## 2021-12-02 NOTE — LETTER
Rhea Jasmine  [address] [place] [address]          Rhea Jasmine,    We have been unable to reach you by phone.    Your blood work shows that her protein in her urine is slightly elevated still.  This is likely in part related to your blood pressure.     Your electrolytes are overall stable.  Your A1c is doing well at 6.2.     You are not anemic and your white count is good.     I would recommend increasing your blood pressure regimen to help protect her kidneys and prevent stroke or heart issues.     I would recommend adding a low dose of an additional medication called amlodipine 2.5 mg a day.     It might be good to have you either come in the clinic in a month to have the blood pressure rechecked.  You would need a nurse only visit if you agree.     You also could follow her blood pressure if needed if you have a cuff.     I think it would be good to see you at the end of March or early April to follow-up on this.     Let me know if you agree to the amlodipine prescription.        Jonathan Perez MD  [facility]  12/2/2021, 8:51 AM      ______________________________________________________________________           Recent Results (from the past 240 hour(s))   Basic metabolic panel     Collection Time: 11/30/21  2:24 PM   Result Value Ref Range     Sodium 132 (L) 136 - 145 mmol/L     Potassium 5.2 (H) 3.5 - 5.0 mmol/L     Chloride 96 (L) 98 - 107 mmol/L     Carbon Dioxide (CO2) 22 22 - 31 mmol/L     Anion Gap 14 5 - 18 mmol/L     Urea Nitrogen 20 8 - 28 mg/dL     Creatinine 1.07 0.60 - 1.10 mg/dL     Calcium 9.6 8.5 - 10.5 mg/dL     Glucose 85 70 - 125 mg/dL     GFR Estimate 51 (L) >60 mL/min/1.73m2   Hemoglobin A1c     Collection Time: 11/30/21  2:24 PM   Result Value Ref Range     Hemoglobin A1C 6.2 (H) 0.0 - 5.6 %   CBC with platelets and differential     Collection Time: 11/30/21  2:24 PM   Result Value Ref Range     WBC Count 8.0 4.0 - 11.0 10e3/uL     RBC  Count 3.71 (L) 3.80 - 5.20 10e6/uL     Hemoglobin 11.7 11.7 - 15.7 g/dL     Hematocrit 34.3 (L) 35.0 - 47.0 %     MCV 93 78 - 100 fL     MCH 31.5 26.5 - 33.0 pg     MCHC 34.1 31.5 - 36.5 g/dL     RDW 13.3 10.0 - 15.0 %     Platelet Count 299 150 - 450 10e3/uL     % Neutrophils 64 %     % Lymphocytes 23 %     % Monocytes 9 %     % Eosinophils 3 %     % Basophils 1 %     % Immature Granulocytes 0 %     Absolute Neutrophils 5.1 1.6 - 8.3 10e3/uL     Absolute Lymphocytes 1.8 0.8 - 5.3 10e3/uL     Absolute Monocytes 0.7 0.0 - 1.3 10e3/uL     Absolute Eosinophils 0.3 0.0 - 0.7 10e3/uL     Absolute Basophils 0.1 0.0 - 0.2 10e3/uL     Absolute Immature Granulocytes 0.0 <=0.4 10e3/uL   Albumin Random Urine Quantitative with Creat Ratio     Collection Time: 11/30/21  2:50 PM   Result Value Ref Range     Microalbumin Urine mg/dL 9.56 (H) 0.00 - 1.99 mg/dL     Creatinine Urine mg/dL 40 mg/dL     Microalbumin Urine mg/g Cr 239.0 (H) <=19.9 mg/g Cr   UA with Microscopic - lab collect     Collection Time: 11/30/21  2:50 PM   Result Value Ref Range     Color Urine Yellow Colorless, Straw, Light Yellow, Yellow     Appearance Urine Clear Clear     Glucose Urine Negative Negative mg/dL     Bilirubin Urine Negative Negative     Ketones Urine Negative Negative mg/dL     Specific Gravity Urine 1.010 1.005 - 1.030     Blood Urine Trace (A) Negative     pH Urine 6.5 5.0 - 8.0     Protein Albumin Urine Trace (A) Negative mg/dL     Urobilinogen Urine 0.2 0.2, 1.0 E.U./dL     Nitrite Urine Negative Negative     Leukocyte Esterase Urine Negative Negative   Urine Microscopic Exam     Collection Time: 11/30/21  2:50 PM   Result Value Ref Range     Bacteria Urine None Seen None Seen /HPF     RBC Urine 0-2 0-2 /HPF /HPF     WBC Urine None Seen 0-5 /HPF /HPF     Squamous Epithelials Urine Few (A) None Seen /LPF

## 2021-12-03 NOTE — TELEPHONE ENCOUNTER
Attempted to call pt received message the person you are trying to call is not accepting calls right now please try again later.  Not able to leave message.  If pt calls back please inform of Dr. Perez's message and assist as needed.

## 2021-12-06 NOTE — TELEPHONE ENCOUNTER
Attempted to call pt received message the person you are trying to call is not accepting calls right now please try again later.  Not able to leave message.  If pt calls back please inform of Dr. Perez's message and assist as needed.    Letter mailed.

## 2021-12-08 DIAGNOSIS — I10 PRIMARY HYPERTENSION: Primary | ICD-10-CM

## 2021-12-08 NOTE — TELEPHONE ENCOUNTER
Reason for Call:  Request for results:    Name of test or procedure: Labwork    Date of test of procedure: 11/30/21    Location of the test or procedure: [facility] to leave the result message on voice mail or with a family member? YES    Phone number Patient can be reached at:  Home number on file [phone] (home)    Additional comments: Rhea called to get results from appointment on 11/30/21. Please call and discuss with her. She would also like a copy of all the test results sent to her. [facility]    Call taken on 12/8/2021 at 5:17 PM by Cyndi Priest

## 2021-12-09 RX ORDER — AMLODIPINE BESYLATE 2.5 MG/1
2.5 TABLET ORAL DAILY
Qty: 90 TABLET | Refills: 3 | Status: SHIPPED | OUTPATIENT
Start: 2021-12-09 | End: 2022-09-18

## 2021-12-09 NOTE — TELEPHONE ENCOUNTER
Please see previous phone message.    Jonathan Perez MD  General Internal Medicine  [facility]  12/9/2021, 9:56 AM

## 2021-12-09 NOTE — TELEPHONE ENCOUNTER
Informed pt of Dr. Perez's message.  She states an understanding.  She is getting a BP cuff for at home.    PT would like to try Amlodipine.  Pharmacy verified.     Pt scheduled for 3/28/2022 at 10:00 AM.  She thanked for the call.    Her blood work shows that her protein in her urine is slightly elevated still.  This is likely in part related to her blood pressure.     Her electrolytes are overall stable.  Her A1c is doing well at 6.2.     She is not anemic and her white count is good.     I would recommend increasing her blood pressure regimen to help protect her kidneys and prevent stroke or heart issues.     I would recommend adding a low dose of an additional medication called amlodipine 2.5 mg a day.     It might be good to have her either come in the clinic in a month to have the blood pressure rechecked.  She would need a nurse only visit if she agrees.     She also could follow her blood pressure if needed.  See if she has a cuff.     I think it would be good to see her at the end of March or early April to follow-up on this.     Note back to me for amlodipine prescription if she agrees.        Jonathan Perez MD  [facility]  12/2/2021, 8:51 AM

## 2021-12-16 DIAGNOSIS — F32.1 MODERATE MAJOR DEPRESSION (H): ICD-10-CM

## 2021-12-16 DIAGNOSIS — E11.9 DM TYPE 2 (DIABETES MELLITUS, TYPE 2) (H): ICD-10-CM

## 2021-12-16 RX ORDER — PEN NEEDLE, DIABETIC 31 GX5/16"
NEEDLE, DISPOSABLE MISCELLANEOUS
Qty: 100 EACH | Refills: 3 | Status: SHIPPED | OUTPATIENT
Start: 2021-12-16 | End: 2023-01-12

## 2021-12-16 RX ORDER — CITALOPRAM HYDROBROMIDE 20 MG/1
TABLET ORAL
Qty: 90 TABLET | Refills: 2 | Status: SHIPPED | OUTPATIENT
Start: 2021-12-16 | End: 2022-09-16

## 2022-02-03 DIAGNOSIS — E78.2 MIXED HYPERLIPIDEMIA: ICD-10-CM

## 2022-02-03 DIAGNOSIS — E11.42 TYPE 2 DIABETES MELLITUS WITH DIABETIC POLYNEUROPATHY, WITH LONG-TERM CURRENT USE OF INSULIN (H): ICD-10-CM

## 2022-02-03 DIAGNOSIS — Z79.4 TYPE 2 DIABETES MELLITUS WITH DIABETIC POLYNEUROPATHY, WITH LONG-TERM CURRENT USE OF INSULIN (H): ICD-10-CM

## 2022-02-03 DIAGNOSIS — I10 ESSENTIAL HYPERTENSION: ICD-10-CM

## 2022-02-03 RX ORDER — METFORMIN HCL 500 MG
TABLET, EXTENDED RELEASE 24 HR ORAL
Qty: 360 TABLET | Refills: 1 | Status: SHIPPED | OUTPATIENT
Start: 2022-02-03 | End: 2022-03-28

## 2022-02-03 RX ORDER — ATORVASTATIN CALCIUM 40 MG/1
TABLET, FILM COATED ORAL
Qty: 90 TABLET | Refills: 3 | Status: SHIPPED | OUTPATIENT
Start: 2022-02-03 | End: 2023-02-06

## 2022-02-03 RX ORDER — LOSARTAN POTASSIUM 50 MG/1
TABLET ORAL
Qty: 90 TABLET | Refills: 1 | Status: SHIPPED | OUTPATIENT
Start: 2022-02-03 | End: 2022-07-31

## 2022-02-28 PROBLEM — G63 POLYNEUROPATHY ASSOCIATED WITH UNDERLYING DISEASE (H): Chronic | Status: ACTIVE | Noted: 2021-02-23

## 2022-03-01 ENCOUNTER — VIRTUAL VISIT (OUTPATIENT)
Dept: SLEEP MEDICINE | Facility: CLINIC | Age: 77
End: 2022-03-01
Attending: INTERNAL MEDICINE
Payer: COMMERCIAL

## 2022-03-01 VITALS — BODY MASS INDEX: 29.73 KG/M2 | HEIGHT: 66 IN | WEIGHT: 185 LBS

## 2022-03-01 DIAGNOSIS — G47.33 OSA (OBSTRUCTIVE SLEEP APNEA): Primary | ICD-10-CM

## 2022-03-01 PROCEDURE — 99443 PR PHYSICIAN TELEPHONE EVALUATION 21-30 MIN: CPT | Mod: 95 | Performed by: PHYSICIAN ASSISTANT

## 2022-03-01 ASSESSMENT — SLEEP AND FATIGUE QUESTIONNAIRES
HOW LIKELY ARE YOU TO NOD OFF OR FALL ASLEEP WHILE SITTING QUIETLY AFTER LUNCH WITHOUT ALCOHOL: WOULD NEVER DOZE
HOW LIKELY ARE YOU TO NOD OFF OR FALL ASLEEP IN A CAR, WHILE STOPPED FOR A FEW MINUTES IN TRAFFIC: WOULD NEVER DOZE
HOW LIKELY ARE YOU TO NOD OFF OR FALL ASLEEP WHEN YOU ARE A PASSENGER IN A CAR FOR AN HOUR WITHOUT A BREAK: WOULD NEVER DOZE
HOW LIKELY ARE YOU TO NOD OFF OR FALL ASLEEP WHILE SITTING AND TALKING TO SOMEONE: WOULD NEVER DOZE
HOW LIKELY ARE YOU TO NOD OFF OR FALL ASLEEP WHILE WATCHING TV: HIGH CHANCE OF DOZING
HOW LIKELY ARE YOU TO NOD OFF OR FALL ASLEEP WHILE SITTING INACTIVE IN A PUBLIC PLACE: WOULD NEVER DOZE
HOW LIKELY ARE YOU TO NOD OFF OR FALL ASLEEP WHILE SITTING AND READING: MODERATE CHANCE OF DOZING
HOW LIKELY ARE YOU TO NOD OFF OR FALL ASLEEP WHILE LYING DOWN TO REST IN THE AFTERNOON WHEN CIRCUMSTANCES PERMIT: HIGH CHANCE OF DOZING

## 2022-03-01 ASSESSMENT — PAIN SCALES - GENERAL: PAINLEVEL: SEVERE PAIN (6)

## 2022-03-01 NOTE — PROGRESS NOTES
"Rhea Jasmine is a 76 year old female being evaluated via a billable telephone visit.         \"This telephone visit will be conducted via a call between you and your physician/provider. We have found that certain health care needs can be provided without the need for an in-person visit or physical exam.  This service lets us provide the care you need with a telephone conversation.  If a prescription is necessary we can send it directly to your pharmacy.  If lab work is needed we can place an order for that and you can then stop by our lab to have the test done at a later time.\"    Telephone visits are billed at different rates depending on your insurance coverage.  Please reach out to your insurance provider with any questions.    Patient has given verbal consent for  a Telephone visit? Yes    What telephone number would you like your provider to contact at at:  [phone]    How would you like to obtain your AVS? Mail a copy    Alexa Millschristophe    Telephone Visit Details:     Telephone Visit Start Time: 9:32 AM    Telephone Visit End Time:9:57 AM           Outpatient Sleep Medicine Consultation:  March 1, 2022    Name: Rhea Jasmine MRN# [id number]   Age: 76 year old YOB: 1945     Date of Consultation: March 1, 2022  Consultation is requested by: Jonathan Perez MD  [address] [address] Jonathan Perez  Primary care provider: Jonathan Perez       Reason for Sleep Consult:     Rhea Jasmine is sent by Jonathan Perez for a sleep consultation regarding obstructive sleep apnea.    Patient s Reason for visit  Rhea A Kenroy main reason for visit: CPAP machine stopped working late last year.   Rheaindira Jasmine's goals for this visit: Try to get better sleep.           Assessment and Plan:     Summary Sleep Diagnoses:  1. Moderate obstructive sleep apnea  2. Poor sleep hygiene    Comorbid Diagnoses:  Hypertension, Diabetes type 2      Summary Recommendations:  Patient " "needs a new machine and a comprehensive DME order is placed for a replacement CPAP with auto pressures of 7-12 cm/H20.    Note:  poor sleep hygiene: 6+ cups of coffee/day including in the middle of the night, long naps and TV in bed.    No caffeine 6 hours before bed    Limit long naps.      No TV in bed, these were all reviewed with patient.    Orders Placed This Encounter   Procedures     Comprehensive DME     Medical Decision-making:   Educational materials provided in instructions    Follow up 2 months after obtaining the replacement CPAP.    Total time spent reviewing medical records, history and physical examination, review of previous testing and interpretation as well as documentation on this date:46 minutes         History of Present Illness:     Rhea Jasmine is a 76 year old female with medical history remarkable for diabetes type 2, HTN, CKD stage 3, hyperlipidemia, depression and obstructive sleep apnea.     Sleep study done at [facility] on 11/15/2013 (209#)-AHI 15.3 , RDI 46,  lowest oxygen saturation was 83%, CPAP 7 cm/H20. Presenting concerns of loud snoring and daytime sleepiness.     She has been on CPAP since 2013. Her CPAP quit working in August of 2021. She also received a letter that her CPAP is subject to recall.  She reports using CPAP regularly before her machine stopped working with benefit. She reports that she sleeps better and more alert when she uses CPAP.    Mask: Nasal mask. DME is [employer]    SLEEP-WAKE SCHEDULE:   She is retired  Usually gets into bed at 11:30 PM  Takes patient about 5 minutes to fall asleep  Has trouble falling asleep 0 nights per week  Wakes up in the middle of the night 2 times per night due to bathroom and uncertain reasons.  She has trouble falling back asleep 0 times a week   Patient is usually up at 5-7:30 AM  Uses alarm? No     Sleep Need  Patient gets \"5\" sleep on average   Patient thinks She needs about ---sleep    Rhea Jasmine prefers " to sleep in this position(s):     Patient states they do the following activities in bed:      Naps  Patient takes a purposeful naps 3-4 times a week and naps are usually  in duration  She feels better after a nap: No  She dozes off unintentionally frequently  Patient has had a driving accident or near-miss due to sleepiness/drowsiness? No    Sleep Disruptions:  Breathing/Snoring  Patient snores: Yes  Other people complain about Her snoring: Yes   Patient has been told She stops breathing in Her sleep: Yes    She has issues with any of the following:  She watches TV in bed.     Movement:  Patient gets pain, discomfort, with an urge to move: No, she reports bad knees and bad hips.    It happens when She is resting:    It happens more at night:   Patient has been told she kicks er legs at night: No     Behaviors in Sleep:  Rhea Jasmine has experienced the following behaviors while sleeping:    She denies any bruxism, sleep walking, sleep talking, dream enactment, sleep paralysis, cataplexy and hypnogogic/hypnopompic hallucinations.    Is there anything else you would like your sleep provider to know:      Caffeine, Alcohol and Other Substances:  Number of caffeinated beverages(coffee, tea, soda, energy drinks) that patient consumes per day:  6 or more cups of coffee  Last caffeine use is usually:  Drinks coffee in the middle of the night.   List of any prescribed or over the counter stimulants that patient takes: None  List of any prescribed or over the counter sleep medication patient takes: None  List of previous sleep medications that patients have tried: None   Patient drinks alcohol to help them sleep: No  Patient drinks alcohol near bedtime: No    Family History:  Patient has a family member been diagnosed with a sleep disorder:  Brother and sister        Scales:  [place] Sleepiness Scale   How likely are you to doze off or fall asleep in the following situations, in contrast to just feeling  "tired?    This refers to your usual way of life recently.    Sitting and reading: Moderate chance of dozing  Watching TV: High chance of dozing  Sitting, inactive in a public place (e.g. a theatre or a meeting): Would never doze  As a passenger in a car for an hour without a break: Would never doze  Lying down to rest in the afternoon when circumstances permit: High chance of dozing  Sitting and talking to someone: Would never doze  Sitting quietly after a lunch without alcohol: Would never doze  In a car, while stopped for a few minutes in traffic: Would never doze    Total score - [place]: 8     ADRIANE Dennis  1720-4031 ESS - [place]/English - Final version - 21 Nov 07 - [facility] [place].       INSOMNIA SEVERITY INDEX (BECCA)    The Insomnia Severity Index has seven questions. The seven answers are added up to get a total score. When you have your total score, look at the \"Guidelines for Scoring/Interpretation\" below to see where your sleep difficulty fits.     For each question, please choose the number that best describes your answer.     Please rate the CURRENT (i.e LAST 2 WEEKS) SEVERITY of your insomnia problem(s).     1. Difficulty falling asleep: None  2. Difficulty staying asleep: Moderate  3. Problems waking up too early: Mild  4. How SATISFIED/DISSATISFIED are you with your CURRENT sleep pattern? Dissatisfied  5. How NOTICEABLE to others do you think your sleep problem is in terms of impairing the quality of your life? Very Much Noticeable  6. How WORRIED/DISTRESSED are you about your sleep problem? Somewhat  7. To what extent do you consider your sleep problem to INTERFERE with your daily functioning (e.g. daytime fatigue, mood, ability to functon at work/daily chores, concentration, memory, mood, etc.) CURRENTLY? Very Much Interfering    BECCA Total Score: 16    Guidelines for Scoring/Interpretation:    Total score categories:  0-7 = No clinically significant insomnia   8-14 = Subthreshold insomnia   15-21 " = Clinical insomnia (moderate severity)  22-28 = Clinical insomnia (severe)    Used via courtesy of www.myhealth.[place].gov with permission from Juan Alcantara PhD., [facility]      GAD7  No flowsheet data found.      Allergies:    Allergies   Allergen Reactions     Penicillins Unknown     Percocet [Oxycodone-Acetaminophen] Hives       Medications:    Current Outpatient Medications   Medication Sig Dispense Refill     acetaminophen (TYLENOL) 500 MG tablet [ACETAMINOPHEN (TYLENOL) 500 MG TABLET] Take 1,000 mg by mouth as needed.       amLODIPine (NORVASC) 2.5 MG tablet Take 1 tablet (2.5 mg) by mouth daily 90 tablet 3     aspirin 81 MG EC tablet [ASPIRIN 81 MG EC TABLET] Take 81 mg by mouth daily.       atorvastatin (LIPITOR) 40 MG tablet TAKE 1 TABLET BY MOUTH EVERYDAY AT BEDTIME 90 tablet 3     B-D U/F 31G X 8 MM insulin pen needle USE DAILY AS DIRECTED 100 each 3     cholecalciferol, vitamin D3, 1,000 unit tablet [CHOLECALCIFEROL, VITAMIN D3, 1,000 UNIT TABLET] Take 1,000 Units by mouth daily.       citalopram (CELEXA) 20 MG tablet TAKE 1 TABLET BY MOUTH EVERY DAY 90 tablet 2     fluticasone (FLONASE) 50 mcg/actuation nasal spray [FLUTICASONE (FLONASE) 50 MCG/ACTUATION NASAL SPRAY] Apply 2 sprays into each nostril daily.       furosemide (LASIX) 40 MG tablet TAKE 1 TABLET BY MOUTH EVERY DAY 90 tablet 0     gabapentin (NEURONTIN) 300 MG capsule [GABAPENTIN (NEURONTIN) 300 MG CAPSULE] TAKE 1 CAPSULE BY MOUTH THREE TIMES A  capsule 3     insulin needles, disposable, 31 Ndle [INSULIN NEEDLES, DISPOSABLE, 31 NDLE] Use As Directed. Use 1x/day       lancets (TRUEPLUS LANCETS) 28 gauge Misc [LANCETS (TRUEPLUS LANCETS) 28 GAUGE MISC] Use As Directed 3 (three) times a day.       LANTUS SOLOSTAR 100 UNIT/ML soln INJECT 20 UNITS SUBCUTANEOUS AT BEDTIME DUE FOR FOLLOW UP IN NOVEMBER. SCHEDULE APPOINTMENT NOW! 15 mL 1     losartan (COZAAR) 50 MG tablet TAKE 1 TABLET BY MOUTH EVERY DAY 90 tablet 1     magnesium oxide  (MAG-OX) 400 mg (241.3 mg magnesium) tablet [MAGNESIUM OXIDE (MAG-OX) 400 MG (241.3 MG MAGNESIUM) TABLET] Take 1 tablet (400 mg total) by mouth 2 (two) times a day. 180 tablet 3     metFORMIN (GLUCOPHAGE-XR) 500 MG 24 hr tablet TAKE 4 TABLETS (2,000 MG) BY MOUTH DAILY OVERDUE FOR VISIT. MAKE APPOINTMENT IN DECEMBER. 360 tablet 1     metoprolol succinate (TOPROL-XL) 50 MG 24 hr tablet [METOPROLOL SUCCINATE (TOPROL-XL) 50 MG 24 HR TABLET] Take 1 tablet (50 mg total) by mouth daily. 90 tablet 3     oxybutynin (DITROPAN) 5 MG tablet [OXYBUTYNIN (DITROPAN) 5 MG TABLET] TAKE 1 TABLET BY MOUTH EVERYDAY AT BEDTIME 90 tablet 3     vitamin E 400 UNIT capsule [VITAMIN E 400 UNIT CAPSULE] Take 400 Units by mouth daily.       blood glucose test strips [BLOOD GLUCOSE TEST STRIPS] Use 1 each As Directed 2 (two) times a day. Dispense brand per patient's insurance at pharmacy discretion. 200 strip 3       Problem List:  Patient Active Problem List    Diagnosis Date Noted     Tobacco abuse 05/21/2015     Priority: High     DM type 2 (diabetes mellitus, type 2) - C peptide 2.4 (2018) (H) 05/21/2015     Priority: High     On angiotensin receptor blockers (ARB) 02/25/2021     Priority: Medium     Polyneuropathy associated with underlying disease (H) 02/23/2021     Priority: Medium     Microalbuminuria due to type 2 diabetes mellitus (H) 08/23/2020     Priority: Medium     Moderate major depression (H) 01/14/2019     Priority: Medium     Mixed incontinence 01/14/2019     Priority: Medium     Tubular adenoma of colon - 2017 - single 9 mm      Priority: Medium     DNR (do not resuscitate) 09/10/2017     Priority: Medium     HTN (hypertension) 05/21/2015     Priority: Medium     PN (peripheral neuropathy) 05/21/2015     Priority: Medium     SHIV (obstructive sleep apnea) 05/21/2015     Priority: Medium     Sleep study done at [facility] on 11/15/2013 (209#)-AHI 15.3 , RDI 46,  lowest oxygen saturation was 83%, CPAP -cm/H20          Low back pain radiating to left leg 05/21/2015     Priority: Medium     CKD (chronic kidney disease) stage 3, GFR 30-59 ml/min (H) 05/21/2015     Priority: Low     HLD (hyperlipidemia) 05/21/2015     Priority: Low     OA (osteoarthritis) 05/21/2015     Priority: Low        Past Medical/Surgical History:  Past Medical History:   Diagnosis Date     CKD (chronic kidney disease) stage 3, GFR 30-59 ml/min (H)      DM type 2 (diabetes mellitus, type 2) (H)      Dysthymia      HLD (hyperlipidemia)      HTN (hypertension)      OA (osteoarthritis)      SHIV (obstructive sleep apnea)     On CPAP     PN (peripheral neuropathy)      Tobacco abuse      Tubular adenoma of colon      Past Surgical History:   Procedure Laterality Date     SEPTOPLASTY       SHOULDER SURGERY   2008     TOTAL SHOULDER ARTHROPLASTY Left 2010     TYMPANOSTOMY TUBE PLACEMENT         Social History:  Social History     Socioeconomic History     Marital status:      Spouse name: Not on file     Number of children: 2     Years of education: Not on file     Highest education level: Not on file   Occupational History     Not on file   Tobacco Use     Smoking status: Current Every Day Smoker     Smokeless tobacco: Never Used   Substance and Sexual Activity     Alcohol use: Yes     Drug use: No     Sexual activity: Not on file   Other Topics Concern     Not on file   Social History Narrative    Retired nursing assistant.    Previous patient of Dr. Ghotra.    Goes to [facility] for exercise.       Social Determinants of Health     Financial Resource Strain: Not on file   Food Insecurity: Not on file   Transportation Needs: Not on file   Physical Activity: Not on file   Stress: Not on file   Social Connections: Not on file   Intimate Partner Violence: Not on file   Housing Stability: Not on file       Family History:  Family History   Problem Relation Age of Onset     Heart Disease Brother      Cancer Brother         BLADDER     Diabetes  "Brother      Atrial fibrillation Sister      Obesity Sister      Sarcoidosis Sister      Diabetes Brother      No Known Problems Son      No Known Problems Son      Colon Cancer Mother      Heart Failure Mother      Diabetes Mother      Diabetes Father      Heart Disease Father        Review of Systems:  A complete review of systems reviewed by me is negative with the exeption of what has been mentioned in the history of present illness.       Physical Examination:  Vitals: Ht 1.676 m (5' 6\")   Wt 83.9 kg (185 lb)   BMI 29.86 kg/m    BMI= Body mass index is 29.86 kg/m .                  Data: All pertinent previous laboratory data reviewed     Last Comprehensive Metabolic Panel:  Sodium   Date Value Ref Range Status   11/30/2021 132 (L) 136 - 145 mmol/L Final     Potassium   Date Value Ref Range Status   11/30/2021 5.2 (H) 3.5 - 5.0 mmol/L Final     Chloride   Date Value Ref Range Status   11/30/2021 96 (L) 98 - 107 mmol/L Final     Carbon Dioxide (CO2)   Date Value Ref Range Status   11/30/2021 22 22 - 31 mmol/L Final     Anion Gap   Date Value Ref Range Status   11/30/2021 14 5 - 18 mmol/L Final     Glucose   Date Value Ref Range Status   11/30/2021 85 70 - 125 mg/dL Final     Urea Nitrogen   Date Value Ref Range Status   11/30/2021 20 8 - 28 mg/dL Final     Creatinine   Date Value Ref Range Status   11/30/2021 1.07 0.60 - 1.10 mg/dL Final     GFR Estimate   Date Value Ref Range Status   11/30/2021 51 (L) >60 mL/min/1.73m2 Final     Comment:     As of July 11, 2021, eGFR is calculated by the CKD-EPI creatinine equation, without race adjustment. eGFR can be influenced by muscle mass, exercise, and diet. The reported eGFR is an estimation only and is only applicable if the renal function is stable.   02/23/2021 52 (L) >60 mL/min/1.73m2 Final     Calcium   Date Value Ref Range Status   11/30/2021 9.6 8.5 - 10.5 mg/dL Final       CC: KIERSTEN Rodríguez 3/1/2022         "

## 2022-03-28 ENCOUNTER — OFFICE VISIT (OUTPATIENT)
Dept: INTERNAL MEDICINE | Facility: CLINIC | Age: 77
End: 2022-03-28
Payer: COMMERCIAL

## 2022-03-28 VITALS
HEART RATE: 76 BPM | BODY MASS INDEX: 29.71 KG/M2 | OXYGEN SATURATION: 96 % | SYSTOLIC BLOOD PRESSURE: 178 MMHG | DIASTOLIC BLOOD PRESSURE: 70 MMHG | WEIGHT: 184.1 LBS

## 2022-03-28 DIAGNOSIS — G63 POLYNEUROPATHY ASSOCIATED WITH UNDERLYING DISEASE (H): ICD-10-CM

## 2022-03-28 DIAGNOSIS — Z79.4 TYPE 2 DIABETES MELLITUS WITH DIABETIC POLYNEUROPATHY, WITH LONG-TERM CURRENT USE OF INSULIN (H): ICD-10-CM

## 2022-03-28 DIAGNOSIS — N39.46 MIXED INCONTINENCE: ICD-10-CM

## 2022-03-28 DIAGNOSIS — I10 ESSENTIAL HYPERTENSION: Primary | ICD-10-CM

## 2022-03-28 DIAGNOSIS — N18.31 STAGE 3A CHRONIC KIDNEY DISEASE (H): ICD-10-CM

## 2022-03-28 DIAGNOSIS — D12.6 TUBULAR ADENOMA OF COLON: ICD-10-CM

## 2022-03-28 DIAGNOSIS — G47.33 OSA (OBSTRUCTIVE SLEEP APNEA): Chronic | ICD-10-CM

## 2022-03-28 DIAGNOSIS — E11.42 TYPE 2 DIABETES MELLITUS WITH DIABETIC POLYNEUROPATHY, WITH LONG-TERM CURRENT USE OF INSULIN (H): ICD-10-CM

## 2022-03-28 PROCEDURE — 99214 OFFICE O/P EST MOD 30 MIN: CPT | Performed by: INTERNAL MEDICINE

## 2022-03-28 RX ORDER — OXYBUTYNIN CHLORIDE 5 MG/1
10 TABLET ORAL AT BEDTIME
Start: 2022-03-28 | End: 2022-05-04

## 2022-03-28 RX ORDER — METFORMIN HCL 500 MG
1500 TABLET, EXTENDED RELEASE 24 HR ORAL DAILY
Qty: 360 TABLET | Refills: 1 | Status: SHIPPED
Start: 2022-03-28 | End: 2022-09-01

## 2022-03-28 RX ORDER — TORSEMIDE 20 MG/1
40 TABLET ORAL DAILY
Qty: 180 TABLET | Refills: 1 | Status: SHIPPED | OUTPATIENT
Start: 2022-03-28 | End: 2022-09-30

## 2022-03-28 NOTE — PATIENT INSTRUCTIONS
Future Appointments   Date Time Provider Department Center   6/17/2022 11:00 AM Jonathan Perez MD [facility]     GFR Estimate   Date Value Ref Range Status   11/30/2021 51 (L) >60 mL/min/1.73m2 Final     Comment:     As of July 11, 2021, eGFR is calculated by the CKD-EPI creatinine equation, without race adjustment. eGFR can be influenced by muscle mass, exercise, and diet. The reported eGFR is an estimation only and is only applicable if the renal function is stable.   02/23/2021 52 (L) >60 mL/min/1.73m2 Final   09/18/2020 44 (L) >60 mL/min/1.73m2 Final   08/21/2020 52 (L) >60 mL/min/1.73m2 Final     GFR Estimate If Black   Date Value Ref Range Status   02/23/2021 >60 >60 mL/min/1.73m2 Final   09/18/2020 53 (L) >60 mL/min/1.73m2 Final   08/21/2020 >60 >60 mL/min/1.73m2 Final

## 2022-03-28 NOTE — PROGRESS NOTES
[facility]    Comprehensive and complex medical care - Chronic disease management - Shared decision making - Care coordination - Compassionate care    Patient advocacy - Rational deprescribing - Minimally disruptive medicine - Ethical focus - Customized care         Date of Service: 3/28/2022  Primary Provider: Jonathan Perez    Patient Care Team:  Jonathan Perez MD as PCP - General  Jonathan Perez MD as Assigned PCP  Speedy Torres MD as Jim Mims Dpm, ALKA as MD          Patient's Pharmacy:    [facility] [id number] [facility] - [place], [place] - [address]  [address]  [place] [address]  Phone: [phone] Fax: [phone]     Patient's Contacts:  Name Home Phone Work Phone Mobile Phone Relationship Lgl Grd   STACY ZAPATA [phone] Other    JAY ZAPATA   [phone] Other      Patient's Insurance:    Payor: [facility] / Plan: [employer] MEDICARE / Product Type: HMO /           Active Problem List:  Problem List as of 3/28/2022 Reviewed: 2/28/2022  9:56 AM by Jonn Yu PA       High    Tobacco abuse    DM type 2 (diabetes mellitus, type 2) - C peptide 2.4 (2018) (H)    DNR (do not resuscitate)       Medium    CKD (chronic kidney disease) stage 3, GFR 30-59 ml/min (H)    HTN (hypertension)    SHIV (obstructive sleep apnea)    Moderate major depression (H)    PN (peripheral neuropathy)       Low    Polyneuropathy associated with underlying disease (H)    HLD (hyperlipidemia)    OA (osteoarthritis)    Low back pain radiating to left leg    Tubular adenoma of colon - 2017 - single 9 mm    Mixed incontinence    Microalbuminuria due to type 2 diabetes mellitus (H)    On angiotensin receptor blockers (ARB)           Current Outpatient Medications   Medication Instructions     acetaminophen (TYLENOL) 1,000 mg, PRN     amLODIPine (NORVASC) 2.5 mg, Oral, DAILY     aspirin (ASA) 81 mg, DAILY     atorvastatin (LIPITOR) 40  MG tablet TAKE 1 TABLET BY MOUTH EVERYDAY AT BEDTIME     B-D U/F 31G X 8 MM insulin pen needle USE DAILY AS DIRECTED     blood glucose test strips 1 each, Does not apply, 2 TIMES DAILY     cholecalciferol 1,000 Units, DAILY     citalopram (CELEXA) 20 MG tablet TAKE 1 TABLET BY MOUTH EVERY DAY     fluticasone (FLONASE) 50 mcg/actuation nasal spray 2 sprays, DAILY     gabapentin (NEURONTIN) 300 MG capsule [GABAPENTIN (NEURONTIN) 300 MG CAPSULE] TAKE 1 CAPSULE BY MOUTH THREE TIMES A DAY     insulin needles, disposable, 31 Ndle [INSULIN NEEDLES, DISPOSABLE, 31 NDLE] Use As Directed. Use 1x/day     lancets (TRUEPLUS LANCETS) 28 gauge Misc 3 TIMES DAILY     LANTUS SOLOSTAR 100 UNIT/ML soln INJECT 20 UNITS SUBCUTANEOUS AT BEDTIME DUE FOR FOLLOW UP IN NOVEMBER. SCHEDULE APPOINTMENT NOW!     losartan (COZAAR) 50 MG tablet TAKE 1 TABLET BY MOUTH EVERY DAY     magnesium oxide (MAG-OX) 400 mg (241.3 mg magnesium) tablet 1 tablet, Oral, 2 TIMES DAILY     metFORMIN (GLUCOPHAGE-XR) 500 MG 24 hr tablet TAKE 4 TABLETS (2,000 MG) BY MOUTH DAILY OVERDUE FOR VISIT. MAKE APPOINTMENT IN DECEMBER.     metoprolol succinate ER (TOPROL-XL) 50 mg, Oral, DAILY     oxybutynin (DITROPAN) 5 MG tablet [OXYBUTYNIN (DITROPAN) 5 MG TABLET] TAKE 1 TABLET BY MOUTH EVERYDAY AT BEDTIME     torsemide (DEMADEX) 40 mg, Oral, DAILY     vitamin E (TOCOPHEROL) 400 Units, DAILY     Social History     Social History Narrative    Retired nursing assistant.    Previous patient of Dr. Ghotra.    Goes to [facility] for exercise.         Subjective:     Rhea Jasmine is a 76 year old female who comes in today for:    Chief Complaint   Patient presents with     Diabetes     RECHECK     HTN      Patient comes in today for a number of issues.    Mainly after follow-up of her high blood pressure.  Her blood pressure remains high at this time.  She was started on amlodipine 2.5 mg at the last visit.  She does have a relatively high potassium.  This was  reviewed with her.    She did have follow-up for sleep apnea.  They tried to send a prescription to [facility] for her supplies.  She has had some problems with this and it continues to be an issue for her.  She wants to put this on the back burner for right now.    She does have a colonoscopy coming up for follow-up of her family history of colon cancer.  Her sister had colon cancer as well as her mother.  Her son recently had 12 polyps removed from his colon.    We reviewed her urinary incontinence.  She does want to try increasing the oxybutynin.  She would like to go to 2 pills a day and see if this helps her.    We reviewed her diabetes and her last A1c was excellent.  She could reduce her Metformin if she wishes.    Her lower extremity edema is doing well and she has no shortness of breath.    We reviewed her other issues noted in the assessment but not specifically addressed in the HPI above.     Family History   Problem Relation Age of Onset     Colon Cancer Mother      Heart Failure Mother      Diabetes Mother      Diabetes Father      Heart Disease Father      Atrial fibrillation Sister      Obesity Sister      Sarcoidosis Sister      Heart Disease Brother      Cancer Brother         BLADDER     Diabetes Brother      Diabetes Brother      Colon Polyps Son      Colon Polyps Son       Objective:     Wt Readings from Last 3 Encounters:   03/28/22 83.5 kg (184 lb 1.6 oz)   03/01/22 83.9 kg (185 lb)   11/30/21 84 kg (185 lb 3.2 oz)     BP Readings from Last 3 Encounters:   03/28/22 (!) 178/70   12/02/21 (!) 168/70   02/25/21 136/76     BP (!) 178/70   Pulse 76   Wt 83.5 kg (184 lb 1.6 oz)   SpO2 96%   BMI 29.71 kg/m     The patient is comfortable, no acute distress.  Mood flat.  Insight good.  Eyes are nonicteric.  Neck is supple without mass.  No cervical adenopathy.  No thyromegaly. Heart regular rate and rhythm.  Lungs clear to auscultation bilaterally.  Respiratory effort is good.  Abdomen soft and  nontender.  No hepatosplenomegaly.  Extremities trace to 1+ left ankle edema.      Diagnostics:     Office Visit on 11/30/2021   Component Date Value Ref Range Status     Sodium 11/30/2021 132 (A) 136 - 145 mmol/L Final     Potassium 11/30/2021 5.2 (A) 3.5 - 5.0 mmol/L Final     Chloride 11/30/2021 96 (A) 98 - 107 mmol/L Final     Carbon Dioxide (CO2) 11/30/2021 22  22 - 31 mmol/L Final     Anion Gap 11/30/2021 14  5 - 18 mmol/L Final     Urea Nitrogen 11/30/2021 20  8 - 28 mg/dL Final     Creatinine 11/30/2021 1.07  0.60 - 1.10 mg/dL Final     Calcium 11/30/2021 9.6  8.5 - 10.5 mg/dL Final     Glucose 11/30/2021 85  70 - 125 mg/dL Final     GFR Estimate 11/30/2021 51 (A) >60 mL/min/1.73m2 Final    As of July 11, 2021, eGFR is calculated by the CKD-EPI creatinine equation, without race adjustment. eGFR can be influenced by muscle mass, exercise, and diet. The reported eGFR is an estimation only and is only applicable if the renal function is stable.     Hemoglobin A1C 11/30/2021 6.2 (A) 0.0 - 5.6 % Final    Normal <5.7%   Prediabetes 5.7-6.4%    Diabetes 6.5% or higher     Note: Adopted from ADA consensus guidelines.     WBC Count 11/30/2021 8.0  4.0 - 11.0 10e3/uL Final     RBC Count 11/30/2021 3.71 (A) 3.80 - 5.20 10e6/uL Final     Hemoglobin 11/30/2021 11.7  11.7 - 15.7 g/dL Final     Hematocrit 11/30/2021 34.3 (A) 35.0 - 47.0 % Final     MCV 11/30/2021 93  78 - 100 fL Final     MCH 11/30/2021 31.5  26.5 - 33.0 pg Final     MCHC 11/30/2021 34.1  31.5 - 36.5 g/dL Final     RDW 11/30/2021 13.3  10.0 - 15.0 % Final     Platelet Count 11/30/2021 299  150 - 450 10e3/uL Final     % Neutrophils 11/30/2021 64  % Final     % Lymphocytes 11/30/2021 23  % Final     % Monocytes 11/30/2021 9  % Final     % Eosinophils 11/30/2021 3  % Final     % Basophils 11/30/2021 1  % Final     % Immature Granulocytes 11/30/2021 0  % Final     Absolute Neutrophils 11/30/2021 5.1  1.6 - 8.3 10e3/uL Final     Absolute Lymphocytes 11/30/2021  1.8  0.8 - 5.3 10e3/uL Final     Absolute Monocytes 11/30/2021 0.7  0.0 - 1.3 10e3/uL Final     Absolute Eosinophils 11/30/2021 0.3  0.0 - 0.7 10e3/uL Final     Absolute Basophils 11/30/2021 0.1  0.0 - 0.2 10e3/uL Final     Absolute Immature Granulocytes 11/30/2021 0.0  <=0.4 10e3/uL Final     Microalbumin Urine mg/dL 11/30/2021 9.56 (A) 0.00 - 1.99 mg/dL Final     Creatinine Urine mg/dL 11/30/2021 40  mg/dL Final     Microalbumin Urine mg/g Cr 11/30/2021 239.0 (A) <=19.9 mg/g Cr Final     Color Urine 11/30/2021 Yellow  Colorless, Straw, Light Yellow, Yellow Final     Appearance Urine 11/30/2021 Clear  Clear Final     Glucose Urine 11/30/2021 Negative  Negative mg/dL Final     Bilirubin Urine 11/30/2021 Negative  Negative Final     Ketones Urine 11/30/2021 Negative  Negative mg/dL Final     Specific Gravity Urine 11/30/2021 1.010  1.005 - 1.030 Final     Blood Urine 11/30/2021 Trace (A) Negative Final     pH Urine 11/30/2021 6.5  5.0 - 8.0 Final     Protein Albumin Urine 11/30/2021 Trace (A) Negative mg/dL Final     Urobilinogen Urine 11/30/2021 0.2  0.2, 1.0 E.U./dL Final     Nitrite Urine 11/30/2021 Negative  Negative Final     Leukocyte Esterase Urine 11/30/2021 Negative  Negative Final     Bacteria Urine 11/30/2021 None Seen  None Seen /HPF Final     RBC Urine 11/30/2021 0-2  0-2 /HPF /HPF Final     WBC Urine 11/30/2021 None Seen  0-5 /HPF /HPF Final     Squamous Epithelials Urine 11/30/2021 Few (A) None Seen /LPF Final       No results found for any visits on 03/28/22.    Assessment:     1. Essential hypertension    2. Polyneuropathy associated with underlying disease (H)    3. Type 2 diabetes mellitus with diabetic polyneuropathy, with long-term current use of insulin (H)    4. Stage 3a chronic kidney disease (H)    5. SHIV (obstructive sleep apnea)    6. Mixed incontinence    7. Tubular adenoma of colon - 2017 - single 9 mm        Plan:     1. Stop furosemide and start torsemide 40 mg a day.  This might work  better for her blood pressure.  2. Consider increasing amlodipine and possibly increasing other medication.  3. Blood work to be done next visit including Chem-8.  4. Neuropathy is stable at this time and continue to monitor.  5. Check blood work next time in relationship to diabetes and continue to monitor.  6. Monitor kidney function with blood work at next visit.  7. Sleep apnea notes reviewed.  8. May reduce Metformin to 3 pills a day.  9. May increase oxybutynin to 5 mg 2 pills at nighttime.  10. Continue current medications otherwise.  11. Follow up sooner if issues.       Jonathan Perez MD  General Internal Medicine  [facility]    Return in about 3 months (around 6/17/2022), or if symptoms worsen or fail to improve, for visit and blood work.     Future Appointments   Date Time Provider Department Center   6/17/2022 11:00 AM Jonathan Perez MD [facility]       Wt Readings from Last 20 Encounters:   03/28/22 83.5 kg (184 lb 1.6 oz)   03/01/22 83.9 kg (185 lb)   11/30/21 84 kg (185 lb 3.2 oz)   02/23/21 86.1 kg (189 lb 14.4 oz)   01/31/20 87.7 kg (193 lb 6.4 oz)   05/13/19 87.1 kg (192 lb)   01/14/19 89 kg (196 lb 3.2 oz)   05/21/18 92.7 kg (204 lb 6.4 oz)   09/08/17 94.3 kg (208 lb)   02/24/17 95.3 kg (210 lb 3.2 oz)   08/30/16 96.6 kg (213 lb)   02/18/16 95.3 kg (210 lb)   11/02/15 93.9 kg (207 lb)   07/20/15 95.1 kg (209 lb 9.6 oz)   06/11/15 95.3 kg (210 lb)   05/21/15 95.7 kg (211 lb)   04/13/15 98.4 kg (217 lb)   03/30/15 91.2 kg (201 lb)     BP Readings from Last 20 Encounters:   03/28/22 (!) 178/70   12/02/21 (!) 168/70   02/25/21 136/76   08/21/20 134/78   02/21/20 136/74   01/31/20 136/70      Pulse Readings from Last 20 Encounters:   03/28/22 76   11/30/21 76   02/23/21 76   08/21/20 74   02/21/20 71   01/31/20 79     SpO2 Readings from Last 20 Encounters:   03/28/22 96%   11/30/21 96%   02/23/21 98%   08/21/20 97%   01/31/20 97%

## 2022-04-20 ENCOUNTER — TRANSFERRED RECORDS (OUTPATIENT)
Dept: HEALTH INFORMATION MANAGEMENT | Facility: CLINIC | Age: 77
End: 2022-04-20
Payer: COMMERCIAL

## 2022-04-29 DIAGNOSIS — M54.16 LUMBAR RADICULAR PAIN: ICD-10-CM

## 2022-04-29 RX ORDER — GABAPENTIN 300 MG/1
CAPSULE ORAL
Qty: 270 CAPSULE | Refills: 3 | Status: SHIPPED | OUTPATIENT
Start: 2022-04-29 | End: 2023-02-06

## 2022-05-04 DIAGNOSIS — I10 ESSENTIAL HYPERTENSION: ICD-10-CM

## 2022-05-04 DIAGNOSIS — N39.46 MIXED INCONTINENCE: ICD-10-CM

## 2022-05-04 RX ORDER — METOPROLOL SUCCINATE 50 MG/1
TABLET, EXTENDED RELEASE ORAL
Qty: 90 TABLET | Refills: 3 | Status: SHIPPED | OUTPATIENT
Start: 2022-05-04 | End: 2023-04-26

## 2022-05-04 RX ORDER — OXYBUTYNIN CHLORIDE 5 MG/1
TABLET ORAL
Qty: 90 TABLET | Refills: 3 | Status: SHIPPED | OUTPATIENT
Start: 2022-05-04 | End: 2022-06-19

## 2022-06-09 ENCOUNTER — HOSPITAL ENCOUNTER (EMERGENCY)
Facility: HOSPITAL | Age: 77
Discharge: HOME | End: 2022-06-09
Attending: FAMILY MEDICINE | Admitting: FAMILY MEDICINE
Payer: COMMERCIAL

## 2022-06-09 ENCOUNTER — APPOINTMENT (OUTPATIENT)
Dept: RADIOLOGY | Facility: HOSPITAL | Age: 77
End: 2022-06-09
Attending: EMERGENCY MEDICINE
Payer: COMMERCIAL

## 2022-06-09 ENCOUNTER — APPOINTMENT (OUTPATIENT)
Dept: CT IMAGING | Facility: HOSPITAL | Age: 77
End: 2022-06-09
Attending: EMERGENCY MEDICINE
Payer: COMMERCIAL

## 2022-06-09 VITALS
HEIGHT: 66 IN | DIASTOLIC BLOOD PRESSURE: 80 MMHG | OXYGEN SATURATION: 98 % | WEIGHT: 181 LBS | TEMPERATURE: 98.9 F | SYSTOLIC BLOOD PRESSURE: 188 MMHG | RESPIRATION RATE: 18 BRPM | HEART RATE: 74 BPM | BODY MASS INDEX: 29.09 KG/M2

## 2022-06-09 DIAGNOSIS — S05.11XA PERIORBITAL CONTUSION OF RIGHT EYE, INITIAL ENCOUNTER: ICD-10-CM

## 2022-06-09 DIAGNOSIS — S01.511A LIP LACERATION, INITIAL ENCOUNTER: ICD-10-CM

## 2022-06-09 DIAGNOSIS — S42.034A CLOSED NONDISPLACED FRACTURE OF ACROMIAL END OF RIGHT CLAVICLE, INITIAL ENCOUNTER: ICD-10-CM

## 2022-06-09 PROCEDURE — 99285 EMERGENCY DEPT VISIT HI MDM: CPT | Mod: 25

## 2022-06-09 PROCEDURE — 70450 CT HEAD/BRAIN W/O DYE: CPT

## 2022-06-09 PROCEDURE — 71046 X-RAY EXAM CHEST 2 VIEWS: CPT

## 2022-06-09 PROCEDURE — 73030 X-RAY EXAM OF SHOULDER: CPT | Mod: RT

## 2022-06-09 PROCEDURE — 72125 CT NECK SPINE W/O DYE: CPT

## 2022-06-09 PROCEDURE — 93005 ELECTROCARDIOGRAM TRACING: CPT | Performed by: EMERGENCY MEDICINE

## 2022-06-09 ASSESSMENT — ENCOUNTER SYMPTOMS: LIGHT-HEADEDNESS: 0

## 2022-06-09 NOTE — ED TRIAGE NOTES
Pt arrives with [employer] fire after a fall in the [employer] parking lot. Pt hit the right side of her head. Pt c/o of right shoulder pain and knee pain as well. No thinners or LOC. Pt stood up and was dizzy.

## 2022-06-09 NOTE — ED NOTES
"ED Provider In Triage Note  [facility]  Encounter Date: Jun 9, 2022    Chief Complaint   Patient presents with     Fall       Brief HPI:   Rhea Jasmine is a 77 year old female presenting to the Emergency Department with a chief complaint of injuries sustained in fall.  Arrives via EMS.  No blood thinners reported.  No loc.  Head and facial injury after fall in parking lot.  EMS to scene, when went to assist patient to standing became dizzy and near syncopal.  EMS transport to scene.    Patient currently complains.  Facial pain.  Fall unprotected.  Believes that she tripped ambulating in parking lot.  Blood in mouth.  Pain right shoulder primary complaint.   Pain to palpation AC and distal clavicle.  Scraps to right ankle and knees, nontender to those location.    Brief Physical Exam:  BP (!) 163/74   Pulse 72   Temp 98.9  F (37.2  C) (Temporal)   Resp 16   Ht 1.676 m (5' 6\")   Wt 82.1 kg (181 lb)   SpO2 97%   BMI 29.21 kg/m    General: Non-toxic appearing  HEENT: Atraumatic  Resp: No respiratory distress  Abdomen: Non-peritoneal  Neuro: Alert, oriented, answers questions appropriately  Psych: Behavior appropriate      Plan Initiated in Triage:  Orders Placed This Encounter   Procedures     Head CT w/o contrast     Cervical spine CT w/o contrast     XR Shoulder Right 2 Views     Chest XR,  PA & LAT     ECG 12-LEAD WITH MUSE (LHE)       PIT Dispo:   Return to lobby while awaiting workup and ED bed availability    Camilo Guzman MD on 6/9/2022 at 1:41 PM    Patient was evaluated by the Physician in Triage due to a limitation of available rooms in the Emergency Department. A plan of care was discussed based on the information obtained on the initial evaluation and patient was consuled to return back to the Emergency Department lobby after this initial evalutaiton until results were obtained or a room became available in the Emergency Department. Patient was counseled not to leave " prior to receiving the results of their workup.     Camilo Guzman MD  [facility] EMERGENCY DEPARTMENT  [address] [place] [id number] [phone]       Camilo Guzman MD  06/09/22 [id number]

## 2022-06-09 NOTE — ED PROVIDER NOTES
EMERGENCY DEPARTMENT ENCOUNTER      NAME: Rhea Jasmine  AGE: 77 year old female  YOB: 1945  MRN: [id number]  EVALUATION DATE & TIME: 6/9/2022  4:26 PM    PCP: Jonathan Perez    ED PROVIDER: Josué Whelan M.D.    Chief Complaint   Patient presents with     Fall       FINAL IMPRESSION:  1. Periorbital contusion of right eye, initial encounter    2. Lip laceration, initial encounter    3. Closed nondisplaced fracture of acromial end of right clavicle, initial encounter        ED COURSE & MEDICAL DECISION MAKING:    Pertinent Labs & Imaging studies personally reviewed and interpreted by me. (See chart for details)  5:05 PM Patient seen and examined, prior records reviewed.  Differential diagnosis includes but not limited to intracranial hemorrhage, skull fracture, contusion, scalp laceration, concussion, facial fracture, nasal fracture, mandible fracture, dental fracture.  Patient presents with a trip and fall, she has a periorbital contusion on the right, also a tiny lip laceration of the right upper lip, and pain of the right lateral clavicle.  CT scan of the head, neck negative.  X-ray of the right shoulder demonstrates distal clavicle fracture consistent with patient's symptoms.  Discussed diagnosis and plan, patient stable for discharge.    At the conclusion of the encounter I discussed the results of all of the tests and the disposition. The questions were answered. The patient or family acknowledged understanding and was agreeable with the care plan.     PROCEDURES:   Procedures    MEDICATIONS GIVEN IN THE EMERGENCY:  Medications - No data to display    NEW PRESCRIPTIONS STARTED AT TODAY'S ER VISIT  Discharge Medication List as of 6/9/2022  5:16 PM          =================================================================    HPI    Patient information was obtained from: Patient      Rhea Jasmine is a 77 year old female with a pertinent history of OA, HTN, HLD, diabetes mellitus type  II, CKD who presents to this ED via EMS for evaluation of a fall.    Patient reports she was walking into [email or web address] when she tripped on cement, landing on the right side of her body and hitting her head. Patient endorses right shoulder pain and knee pain. Denies loss of consciousness. States she is not on blood thinners. Denies chest pain, lightheadedness or any other medical complaint at this       REVIEW OF SYSTEMS   Review of Systems   Cardiovascular: Negative for chest pain.   Musculoskeletal:        Positive for right shoulder pain and right knee pain.    Neurological: Negative for light-headedness.   All other systems reviewed and are negative.     All other systems reviewed and negative    PAST MEDICAL HISTORY:  Past Medical History:   Diagnosis Date     CKD (chronic kidney disease) stage 3, GFR 30-59 ml/min (H)      DM type 2 (diabetes mellitus, type 2) (H)      Dysthymia      HLD (hyperlipidemia)      HTN (hypertension)      OA (osteoarthritis)      SHIV (obstructive sleep apnea)     On CPAP     PN (peripheral neuropathy)      Tobacco abuse      Tubular adenoma of colon        PAST SURGICAL HISTORY:  Past Surgical History:   Procedure Laterality Date     SEPTOPLASTY       SHOULDER SURGERY   2008     TOTAL SHOULDER ARTHROPLASTY Left 2010     TYMPANOSTOMY TUBE PLACEMENT         CURRENT MEDICATIONS:    No current facility-administered medications for this encounter.     Current Outpatient Medications   Medication     acetaminophen (TYLENOL) 500 MG tablet     amLODIPine (NORVASC) 2.5 MG tablet     aspirin 81 MG EC tablet     atorvastatin (LIPITOR) 40 MG tablet     B-D U/F 31G X 8 MM insulin pen needle     blood glucose test strips     cholecalciferol, vitamin D3, 1,000 unit tablet     citalopram (CELEXA) 20 MG tablet     fluticasone (FLONASE) 50 mcg/actuation nasal spray     gabapentin (NEURONTIN) 300 MG capsule     insulin needles, disposable, 31 Ndle     lancets (TRUEPLUS LANCETS) 28 gauge Misc     LANTUS  "SOLOSTAR 100 UNIT/ML soln     losartan (COZAAR) 50 MG tablet     magnesium oxide (MAG-OX) 400 mg (241.3 mg magnesium) tablet     metFORMIN (GLUCOPHAGE-XR) 500 MG 24 hr tablet     metoprolol succinate ER (TOPROL-XL) 50 MG 24 hr tablet     oxybutynin (DITROPAN) 5 MG tablet     torsemide (DEMADEX) 20 MG tablet     vitamin E 400 UNIT capsule       ALLERGIES:  Allergies   Allergen Reactions     Penicillins Unknown     Percocet [Oxycodone-Acetaminophen] Hives       FAMILY HISTORY:  Family History   Problem Relation Age of Onset     Colon Cancer Mother      Heart Failure Mother      Diabetes Mother      Diabetes Father      Heart Disease Father      Atrial fibrillation Sister      Obesity Sister      Sarcoidosis Sister      Heart Disease Brother      Cancer Brother         BLADDER     Diabetes Brother      Diabetes Brother      Colon Polyps Son      Colon Polyps Son        SOCIAL HISTORY:   Social History     Socioeconomic History     Marital status:      Number of children: 2   Tobacco Use     Smoking status: Current Every Day Smoker     Smokeless tobacco: Never Used   Substance and Sexual Activity     Alcohol use: Yes     Drug use: No   Social History Narrative    Retired nursing assistant.    Previous patient of Dr. Ghotra.    Goes to [facility] [employer] [facility] for exercise.         VITALS:  BP (!) 188/80   Pulse 74   Temp 98.9  F (37.2  C) (Temporal)   Resp 18   Ht 1.676 m (5' 6\")   Wt 82.1 kg (181 lb)   SpO2 98%   BMI 29.21 kg/m      PHYSICAL EXAM:  Physical Exam  Vitals and nursing note reviewed.   Constitutional:       Appearance: Normal appearance.   HENT:      Head: Normocephalic and atraumatic.      Right Ear: External ear normal.      Left Ear: External ear normal.      Nose: Nose normal.      Mouth/Throat:      Mouth: Mucous membranes are moist.   Eyes:      Extraocular Movements: Extraocular movements intact.      Conjunctiva/sclera: Conjunctivae normal.      Pupils: Pupils are equal, round, and " reactive to light.   Cardiovascular:      Rate and Rhythm: Normal rate and regular rhythm.   Pulmonary:      Effort: Pulmonary effort is normal.      Breath sounds: Normal breath sounds. No wheezing or rales.   Abdominal:      General: Abdomen is flat. There is no distension.      Palpations: Abdomen is soft.      Tenderness: There is no abdominal tenderness. There is no guarding.   Musculoskeletal:         General: Normal range of motion.      Cervical back: Normal range of motion and neck supple.      Right lower leg: No edema.      Left lower leg: No edema.   Lymphadenopathy:      Cervical: No cervical adenopathy.   Skin:     General: Skin is warm and dry.      Comments: Bruising of right periorbital area. 2 mm laceration on right upper lip   Neurological:      General: No focal deficit present.      Mental Status: She is alert and oriented to person, place, and time. Mental status is at baseline.      Comments: No gross focal neurologic deficits   Psychiatric:         Mood and Affect: Mood normal.         Behavior: Behavior normal.         Thought Content: Thought content normal.          LAB:  All pertinent labs reviewed and interpreted.  Results for orders placed or performed during the hospital encounter of 06/09/22   Head CT w/o contrast    Impression    IMPRESSION:  1.  No evidence of acute intracranial hemorrhage.  2.  No evidence of acute calvarial fracture.  3.  Mild atrophy.   Cervical spine CT w/o contrast    Impression     IMPRESSION:  1.  No evidence of acute displaced fracture.  2.  No evidence of traumatic subluxation.  3.  Mild degenerative changes.   XR Shoulder Right 2 Views    Impression    IMPRESSION: Abnormal appearance of the distal clavicle, question nondisplaced fracture of the distal shaft lateral to the coracoclavicular ligament attachment. Dedicated clavicle radiographs may be helpful for better visualization of this area. No other   fracture in the right shoulder. No evidence of joint  malalignment. Severe arthritic changes in the glenohumeral joint.    Chest XR,  PA & LAT    Impression    IMPRESSION: No acute findings. Lungs are clear. Tiny eventration of hemidiaphragm unchanged no significance.       RADIOLOGY:  Reviewed all pertinent imaging. Please see official radiology report.  XR Shoulder Right 2 Views   Final Result   IMPRESSION: Abnormal appearance of the distal clavicle, question nondisplaced fracture of the distal shaft lateral to the coracoclavicular ligament attachment. Dedicated clavicle radiographs may be helpful for better visualization of this area. No other    fracture in the right shoulder. No evidence of joint malalignment. Severe arthritic changes in the glenohumeral joint.       Chest XR,  PA & LAT   Final Result   IMPRESSION: No acute findings. Lungs are clear. Tiny eventration of hemidiaphragm unchanged no significance.      Cervical spine CT w/o contrast   Final Result    IMPRESSION:   1.  No evidence of acute displaced fracture.   2.  No evidence of traumatic subluxation.   3.  Mild degenerative changes.      Head CT w/o contrast   Final Result   IMPRESSION:   1.  No evidence of acute intracranial hemorrhage.   2.  No evidence of acute calvarial fracture.   3.  Mild atrophy.        I, Wood Barney, am serving as a scribe to document services personally performed by Dr. Whelan based on my observation and the provider's statements to me. I, Josué Whelan MD attest that Wood Banrey is acting in a scribe capacity, has observed my performance of the services and has documented them in accordance with my direction.    Josué Whelan M.D.  Emergency Medicine  [facility] EMERGENCY DEPARTMENT  [address] [facility] [address] [phone]  Dept: [phone]       Josué Whelan MD  06/09/22 2364

## 2022-06-10 LAB
ATRIAL RATE - MUSE: 74 BPM
DIASTOLIC BLOOD PRESSURE - MUSE: NORMAL MMHG
INTERPRETATION ECG - MUSE: NORMAL
P AXIS - MUSE: 77 DEGREES
PR INTERVAL - MUSE: 160 MS
QRS DURATION - MUSE: 72 MS
QT - MUSE: 384 MS
QTC - MUSE: 426 MS
R AXIS - MUSE: 57 DEGREES
SYSTOLIC BLOOD PRESSURE - MUSE: NORMAL MMHG
T AXIS - MUSE: 66 DEGREES
VENTRICULAR RATE- MUSE: 74 BPM

## 2022-06-13 ENCOUNTER — TRANSFERRED RECORDS (OUTPATIENT)
Dept: HEALTH INFORMATION MANAGEMENT | Facility: CLINIC | Age: 77
End: 2022-06-13
Payer: COMMERCIAL

## 2022-06-17 ENCOUNTER — OFFICE VISIT (OUTPATIENT)
Dept: INTERNAL MEDICINE | Facility: CLINIC | Age: 77
End: 2022-06-17
Payer: COMMERCIAL

## 2022-06-17 VITALS
BODY MASS INDEX: 28.57 KG/M2 | DIASTOLIC BLOOD PRESSURE: 82 MMHG | HEART RATE: 70 BPM | WEIGHT: 177 LBS | SYSTOLIC BLOOD PRESSURE: 168 MMHG | RESPIRATION RATE: 18 BRPM | OXYGEN SATURATION: 98 %

## 2022-06-17 DIAGNOSIS — S01.511A LIP LACERATION, INITIAL ENCOUNTER: ICD-10-CM

## 2022-06-17 DIAGNOSIS — Z79.4 TYPE 2 DIABETES MELLITUS WITH DIABETIC POLYNEUROPATHY, WITH LONG-TERM CURRENT USE OF INSULIN (H): ICD-10-CM

## 2022-06-17 DIAGNOSIS — N39.46 MIXED INCONTINENCE: ICD-10-CM

## 2022-06-17 DIAGNOSIS — E11.42 TYPE 2 DIABETES MELLITUS WITH DIABETIC POLYNEUROPATHY, WITH LONG-TERM CURRENT USE OF INSULIN (H): ICD-10-CM

## 2022-06-17 DIAGNOSIS — I10 ESSENTIAL HYPERTENSION: ICD-10-CM

## 2022-06-17 DIAGNOSIS — S42.034A CLOSED NONDISPLACED FRACTURE OF ACROMIAL END OF RIGHT CLAVICLE, INITIAL ENCOUNTER: Primary | ICD-10-CM

## 2022-06-17 DIAGNOSIS — M25.561 CHRONIC PAIN OF BOTH KNEES: ICD-10-CM

## 2022-06-17 DIAGNOSIS — G89.29 CHRONIC PAIN OF BOTH KNEES: ICD-10-CM

## 2022-06-17 DIAGNOSIS — M25.562 CHRONIC PAIN OF BOTH KNEES: ICD-10-CM

## 2022-06-17 LAB
ANION GAP SERPL CALCULATED.3IONS-SCNC: 15 MMOL/L (ref 5–18)
BUN SERPL-MCNC: 21 MG/DL (ref 8–28)
CALCIUM SERPL-MCNC: 9.7 MG/DL (ref 8.5–10.5)
CHLORIDE BLD-SCNC: 95 MMOL/L (ref 98–107)
CO2 SERPL-SCNC: 19 MMOL/L (ref 22–31)
CREAT SERPL-MCNC: 1.06 MG/DL (ref 0.6–1.1)
GFR SERPL CREATININE-BSD FRML MDRD: 54 ML/MIN/1.73M2
GLUCOSE BLD-MCNC: 77 MG/DL (ref 70–125)
HBA1C MFR BLD: 6 % (ref 0–5.6)
POTASSIUM BLD-SCNC: 4.5 MMOL/L (ref 3.5–5)
SODIUM SERPL-SCNC: 129 MMOL/L (ref 136–145)

## 2022-06-17 PROCEDURE — 36415 COLL VENOUS BLD VENIPUNCTURE: CPT | Performed by: INTERNAL MEDICINE

## 2022-06-17 PROCEDURE — 80048 BASIC METABOLIC PNL TOTAL CA: CPT | Performed by: INTERNAL MEDICINE

## 2022-06-17 PROCEDURE — 0054A COVID-19,PF,PFIZER (12+ YRS): CPT | Performed by: INTERNAL MEDICINE

## 2022-06-17 PROCEDURE — 99214 OFFICE O/P EST MOD 30 MIN: CPT | Mod: 25 | Performed by: INTERNAL MEDICINE

## 2022-06-17 PROCEDURE — 91305 COVID-19,PF,PFIZER (12+ YRS): CPT | Performed by: INTERNAL MEDICINE

## 2022-06-17 PROCEDURE — 83036 HEMOGLOBIN GLYCOSYLATED A1C: CPT | Performed by: INTERNAL MEDICINE

## 2022-06-17 RX ORDER — OXYBUTYNIN CHLORIDE 10 MG/1
10 TABLET, EXTENDED RELEASE ORAL AT BEDTIME
Qty: 90 TABLET | Refills: 3 | Status: SHIPPED | OUTPATIENT
Start: 2022-06-17 | End: 2023-06-18

## 2022-06-17 RX ORDER — OXYBUTYNIN CHLORIDE 5 MG/1
10 TABLET ORAL AT BEDTIME
Qty: 180 TABLET | Refills: 3 | Status: CANCELLED | OUTPATIENT
Start: 2022-06-17

## 2022-06-17 NOTE — LETTER
6/19/2022    Rhea Jasmine   [address] [place] [address]         Dear Rhea,    It was good to see you in clinic.  I hope your questions were answered at the time of your visit.    The results of your tests from your visit were as follows:    Resulted Orders   HEMOGLOBIN A1C   Result Value Ref Range    Hemoglobin A1C 6.0 (H) 0.0 - 5.6 %      Comment:      Normal <5.7%   Prediabetes 5.7-6.4%    Diabetes 6.5% or higher     Note: Adopted from ADA consensus guidelines.   Basic metabolic panel   Result Value Ref Range    Sodium 129 (L) 136 - 145 mmol/L    Potassium 4.5 3.5 - 5.0 mmol/L    Chloride 95 (L) 98 - 107 mmol/L    Carbon Dioxide (CO2) 19 (L) 22 - 31 mmol/L    Anion Gap 15 5 - 18 mmol/L    Urea Nitrogen 21 8 - 28 mg/dL    Creatinine 1.06 0.60 - 1.10 mg/dL    Calcium 9.7 8.5 - 10.5 mg/dL    Glucose 77 70 - 125 mg/dL    GFR Estimate 54 (L) >60 mL/min/1.73m2      Comment:      Effective December 21, 2021 eGFRcr in adults is calculated using the 2021 CKD-EPI creatinine equation which includes age and gender (Maranda et al., NEJM, DOI: 10.1056/FQYWrd0130246)     Your diabetes continues to do very well.  If you wish to reduce your metformin to 2 pills a day at this time, this would be reasonable.    Your sodium remains a little low but not concerning at this time.  Your kidney tests were normal.     I hope your symptoms get better in relationship to your collarbone fracture.    Please follow up again in 4-6 months, sooner if issues.    If you have any questions regarding these results, please feel free to contact me at [phone].  I wish you the best of health!      Sincerely,       Jonathan Perez MD  General Internal Medicine  [facility]

## 2022-06-20 NOTE — PROGRESS NOTES
[facility]  Dr. Jonathan Perez             Date of Service: 6/17/2022  Primary Provider: Jonathan Perez    Patient Care Team:  Jonathan Perez MD as PCP - General  Jonathan Perez MD as Assigned PCP  Speedy Torres MD as Jim Mims Dpm, DPM (Inactive) as MD           Patient's Pharmacy:    [facility] [id number] [facility] - [place], [place] - [address]  [address]  [place] [address]  Phone: [phone] Fax: [phone]     Patient's Contacts:  Name Home Phone Work Phone Mobile Phone Relationship Lgl GrSTACY Lee [phone] JAY Sauceda   [phone] Son         Patient's Insurance:    Payor: [email or web address] / Plan: [employer] MEDICARE / Product Type: HMO /            Active Problem List:  Problem List as of 6/17/2022 Reviewed: 6/9/2022  4:59 PM by Wood Barney    DNR (do not resuscitate)       Medium    CKD (chronic kidney disease) stage 3, GFR 30-59 ml/min (H)    Moderate major depression (H)       Low    Polyneuropathy associated with underlying disease (H)    Low back pain radiating to left leg    Tubular adenoma of colon - 2017 - single 9 mm    Mixed incontinence    Microalbuminuria due to type 2 diabetes mellitus (H)    On angiotensin receptor blockers (ARB)       Other    SHIV (obstructive sleep apnea)       Other    Tobacco abuse    DM type 2 (diabetes mellitus, type 2) - C peptide 2.4 (2018) (H)       Other    HTN (hypertension)    PN (peripheral neuropathy)       Other    HLD (hyperlipidemia)    OA (osteoarthritis)           Current Outpatient Medications   Medication Instructions     acetaminophen (TYLENOL) 1,000 mg, PRN     amLODIPine (NORVASC) 2.5 mg, Oral, DAILY     aspirin (ASA) 81 mg, DAILY     atorvastatin (LIPITOR) 40 MG tablet TAKE 1 TABLET BY MOUTH EVERYDAY AT BEDTIME     B-D U/F 31G X 8 MM insulin pen needle USE DAILY AS DIRECTED     blood glucose test strips 1 each, Does not apply,  2 TIMES DAILY     cholecalciferol 1,000 Units, DAILY     citalopram (CELEXA) 20 MG tablet TAKE 1 TABLET BY MOUTH EVERY DAY     fluticasone (FLONASE) 50 mcg/actuation nasal spray 2 sprays, DAILY     gabapentin (NEURONTIN) 300 MG capsule TAKE 1 CAPSULE BY MOUTH THREE TIMES A DAY     insulin needles, disposable, 31 Ndle [INSULIN NEEDLES, DISPOSABLE, 31 NDLE] Use As Directed. Use 1x/day     lancets (TRUEPLUS LANCETS) 28 gauge Misc 3 TIMES DAILY     LANTUS SOLOSTAR 100 UNIT/ML soln INJECT 20 UNITS SUBCUTANEOUS AT BEDTIME DUE FOR FOLLOW UP IN NOVEMBER. SCHEDULE APPOINTMENT NOW!     losartan (COZAAR) 50 MG tablet TAKE 1 TABLET BY MOUTH EVERY DAY     magnesium oxide (MAG-OX) 400 mg (241.3 mg magnesium) tablet 1 tablet, Oral, 2 TIMES DAILY     metFORMIN (GLUCOPHAGE XR) 1,500 mg, Oral, DAILY     metoprolol succinate ER (TOPROL-XL) 50 MG 24 hr tablet TAKE 1 TABLET BY MOUTH EVERY DAY     oxybutynin (DITROPAN) 5 MG tablet TAKE 1 TABLET BY MOUTH EVERYDAY AT BEDTIME     oxybutynin ER (DITROPAN XL) 10 mg, Oral, AT BEDTIME     torsemide (DEMADEX) 40 mg, Oral, DAILY     vitamin E (TOCOPHEROL) 400 Units, DAILY      Social History     Social History Narrative    Retired nursing assistant.    Previous patient of Dr. Ghotra.    Goes to [facility] for exercise.         Subjective:     Rhea Jasmine is a 77 year old female who comes in today for:    Chief Complaint   Patient presents with     Hospital F/U     Fall, did see ortho, has numbness in right hand     Diabetes     RECHECK     BP      Patient comes back today to follow-up a number of issues.    She was just in the emergency room with a fall.  She sustained a clavicle fracture, a bruised right orbit, and lip laceration of the right lower lip.  She also fell on her knees and bruised them and has had pain here previous but this is exacerbated by the current fall.    She has had some bleeding still from the lip since her ER stay but this has not been bleeding in the  last 4 days.  She did not have any intervention upon this.    Her clavicle fracture was reviewed.  She is been in and out of a sling for this.  She has seen orthopedics and has follow-up with them related to this.    Her knee pain is not a ton worse but is worse.  She had more bruising but this is improved.    Her eye bruise is doing well.    She is currently taking 3000 mg of Tylenol daily for her pain and this is helping.  She did not take more than 1 Vicodin that she was given in the emergency room.    We are following up on her diabetes and will check up on this.  Her last A1c was good and we recommended she reduce her metformin to 1500 mg a day.    Her blood pressure remains high at this time.  She does not want to do anything about this especially in light of her current pain.  She is currently on torsemide, losartan, metoprolol, and amlodipine.  We will check this again in the future.    The increased dose of oxybutynin seems to be helping with her urination more.  She would like to get the 10 mg dose and go from there.    We reviewed her other issues noted in the assessment but not specifically addressed in the HPI above.     Objective:     Wt Readings from Last 3 Encounters:   06/17/22 80.3 kg (177 lb)   06/09/22 82.1 kg (181 lb)   03/28/22 83.5 kg (184 lb 1.6 oz)     BP Readings from Last 3 Encounters:   06/17/22 (!) 168/82   06/09/22 (!) 188/80   03/28/22 (!) 178/70     BP (!) 168/82   Pulse 70   Resp 18   Wt 80.3 kg (177 lb)   SpO2 98%   BMI 28.57 kg/m     The patient is comfortable, no acute distress.  Mood good.  Insight good.  Eyes are nonicteric.  Neck is supple without mass.  No cervical adenopathy.  No thyromegaly. Heart regular rate and rhythm.  Lungs clear to auscultation bilaterally.  Respiratory effort is good.  Extremities trace edema.  Some residual bruising over her kneecaps.  Her lip laceration looks good at this time with no obvious bleeding or scab at this point.  She has her right  shoulder in the sling and the results from orthopedics were reviewed as well.    Diagnostics:     Results for orders placed or performed in visit on 06/17/22   HEMOGLOBIN A1C     Status: Abnormal   Result Value Ref Range    Hemoglobin A1C 6.0 (H) 0.0 - 5.6 %   Basic metabolic panel     Status: Abnormal   Result Value Ref Range    Sodium 129 (L) 136 - 145 mmol/L    Potassium 4.5 3.5 - 5.0 mmol/L    Chloride 95 (L) 98 - 107 mmol/L    Carbon Dioxide (CO2) 19 (L) 22 - 31 mmol/L    Anion Gap 15 5 - 18 mmol/L    Urea Nitrogen 21 8 - 28 mg/dL    Creatinine 1.06 0.60 - 1.10 mg/dL    Calcium 9.7 8.5 - 10.5 mg/dL    Glucose 77 70 - 125 mg/dL    GFR Estimate 54 (L) >60 mL/min/1.73m2        ______________________________________________________________________    Pertinent radiology for this visit includes the following:    Cervical spine CT w/o contrast  Narrative: EXAM: CT CERVICAL SPINE W/O CONTRAST  LOCATION: [facility]  DATE/TIME: 6/9/2022 2:28 PM    INDICATION: Trauma, pain.  COMPARISON: None.  TECHNIQUE: Routine CT Cervical Spine without IV contrast. Multiplanar reformats. Dose reduction techniques were used.    FINDINGS:  ALIGNMENT: Mild C6-C7, C7-T1 and T1-T2 anterolisthesis.    BONES: There is no evidence of acute displaced fractures. The occipital condyles appear intact. Vertebral body heights are unremarkable. Mild C7-T1 facet arthropathy    DISCS: Mild C5-C6 intervertebral disc height loss with broad-based disc-osteophyte complex formation. No large posterior disc abnormalities are demonstrated. Shallow disc bulging at C4-C5 and C6-C7.    SPINAL CANAL: No high-grade stenosis.    NEURAL FORAMINA: Moderate left C5-C6 neural foraminal stenosis.    SOFT TISSUES: No prevertebral soft tissue thickening. Unremarkable paraspinal soft tissues.    UPPER THORAX: The visualized lung apices are well aerated.  Impression:  IMPRESSION:  1.  No evidence of acute displaced fracture.  2.  No evidence of  traumatic subluxation.  3.  Mild degenerative changes.    Head CT w/o contrast  Narrative: EXAM: CT HEAD W/O CONTRAST  LOCATION: [facility]  DATE/TIME: 6/9/2022 2:28 PM    INDICATION: Trauma, pain.  COMPARISON: None.  TECHNIQUE: Routine CT Head without IV contrast. Multiplanar reformats. Dose reduction techniques were used.    FINDINGS:  INTRACRANIAL CONTENTS: No intracranial hemorrhage, extraaxial collection, or mass effect.  No CT evidence of acute infarct. Unremarkable parenchymal attenuation. Mild generalized volume loss. No hydrocephalus. The sella is unremarkable for technique. The   cerebellar tonsils are appropriately positioned.     VISUALIZED ORBITS/SINUSES/MASTOIDS: No intraorbital abnormality. No paranasal sinus mucosal disease. No middle ear or mastoid effusion.    BONES/SOFT TISSUES: No scalp hematoma. No skull fracture.  Impression: IMPRESSION:  1.  No evidence of acute intracranial hemorrhage.  2.  No evidence of acute calvarial fracture.  3.  Mild atrophy.    Chest XR,  PA & LAT  Narrative: EXAM: XR CHEST 2 VW  LOCATION: [facility]  DATE/TIME: 6/9/2022 2:43 PM    INDICATION: right anterior superior cp after fall  COMPARISON: 05/13/2019  Impression: IMPRESSION: No acute findings. Lungs are clear. Tiny eventration of hemidiaphragm unchanged no significance.    XR Shoulder Right 2 Views  Narrative: EXAM: XR SHOULDER 2 VIEW RIGHT  LOCATION: [facility]  DATE/TIME: 6/9/2022 2:33 PM    INDICATION: Right shoulder pain after trauma.  COMPARISON: Chest radiographs 5/13/2019 .  Impression: IMPRESSION: Abnormal appearance of the distal clavicle, question nondisplaced fracture of the distal shaft lateral to the coracoclavicular ligament attachment. Dedicated clavicle radiographs may be helpful for better visualization of this area. No other   fracture in the right shoulder. No evidence of joint malalignment. Severe arthritic changes in  the glenohumeral joint.       Assessment:     1. Closed nondisplaced fracture of acromial end of right clavicle, initial encounter    2. Mixed incontinence    3. Type 2 diabetes mellitus with diabetic polyneuropathy, with long-term current use of insulin (H)    4. Essential hypertension    5. Lip laceration, initial encounter    6. Chronic pain of both knees        Plan:     1. Follow-up with orthopedics as scheduled.  2. Anticipate full recovery.  3. Blood work done today.  4. COVID booster done.  5. Lip laceration looks good at this time.  6. Follow-up if knee is not improving.  7. Continue current medications otherwise.  8. Follow up sooner if issues.    Orders Placed This Encounter   Procedures     COVID-19,CHAPARRO,PFIZER (12+ YRS)     HEMOGLOBIN A1C     Basic metabolic panel           Jonathan Perez MD  General Internal Medicine  [facility]      Return in about 6 months (around 12/17/2022), or if symptoms worsen or fail to improve, for visit and blood work, annual wellness visit.     No future appointments.

## 2022-06-20 NOTE — PATIENT INSTRUCTIONS
Please follow up if you have any further issues.    You may contact me by phone or MyChart if you are worsening or if things are not improving.    ______________________________________________________________________     Please remember that you can call [phone] to schedule an appointment.     You can schedule appointments 24 hours a day, 7 days a week.  Sometimes the best time to schedule an appointment is after clinic hours when less people are calling in.  Weekends are another option for calling in to schedule appointments.

## 2022-07-11 ENCOUNTER — TRANSFERRED RECORDS (OUTPATIENT)
Dept: HEALTH INFORMATION MANAGEMENT | Facility: CLINIC | Age: 77
End: 2022-07-11

## 2022-07-31 DIAGNOSIS — E11.42 TYPE 2 DIABETES MELLITUS WITH DIABETIC POLYNEUROPATHY, WITH LONG-TERM CURRENT USE OF INSULIN (H): ICD-10-CM

## 2022-07-31 DIAGNOSIS — I10 ESSENTIAL HYPERTENSION: ICD-10-CM

## 2022-07-31 DIAGNOSIS — Z79.4 TYPE 2 DIABETES MELLITUS WITH DIABETIC POLYNEUROPATHY, WITH LONG-TERM CURRENT USE OF INSULIN (H): ICD-10-CM

## 2022-07-31 RX ORDER — BLOOD SUGAR DIAGNOSTIC
STRIP MISCELLANEOUS
Qty: 200 STRIP | Refills: 3 | Status: SHIPPED | OUTPATIENT
Start: 2022-07-31 | End: 2024-01-29

## 2022-07-31 RX ORDER — LOSARTAN POTASSIUM 50 MG/1
TABLET ORAL
Qty: 90 TABLET | Refills: 1 | Status: SHIPPED | OUTPATIENT
Start: 2022-07-31 | End: 2023-01-25

## 2022-08-04 DIAGNOSIS — E83.42 HYPOMAGNESEMIA: ICD-10-CM

## 2022-08-04 RX ORDER — LANOLIN ALCOHOL/MO/W.PET/CERES
CREAM (GRAM) TOPICAL
Qty: 180 TABLET | Refills: 3 | Status: SHIPPED | OUTPATIENT
Start: 2022-08-04 | End: 2023-08-09

## 2022-08-04 NOTE — TELEPHONE ENCOUNTER
Routing refill request to provider for review/approval because:  Drug not on the [facility] refill protocol     Last Written Prescription Date:  1/28/21  Last Fill Quantity: 180,  # refills: 3   Last office visit provider:  6/17/22     Requested Prescriptions   Pending Prescriptions Disp Refills     MAGNESIUM OXIDE 400 (240 Mg) MG tablet [Pharmacy Med Name: MAGNESIUM OXIDE 400 MG TABLET] 180 tablet 3     Sig: TAKE 1 TABLET BY MOUTH TWICE A DAY       There is no refill protocol information for this order          Makenzie Freitas RN 08/04/22 3:54 PM

## 2022-08-08 ENCOUNTER — TRANSFERRED RECORDS (OUTPATIENT)
Dept: HEALTH INFORMATION MANAGEMENT | Facility: CLINIC | Age: 77
End: 2022-08-08

## 2022-08-24 ENCOUNTER — TELEPHONE (OUTPATIENT)
Dept: INTERNAL MEDICINE | Facility: CLINIC | Age: 77
End: 2022-08-24

## 2022-08-24 DIAGNOSIS — Z78.0 POSTMENOPAUSAL STATUS: Primary | ICD-10-CM

## 2022-08-24 NOTE — TELEPHONE ENCOUNTER
Order/Referral Request    Who is requesting: Patient    Orders being requested: DEXA referral/order    Reason service is needed/diagnosis: patient attempted to schedule and did not realize needed order/referral    Okay to leave a detailed message?: Yes at Home number on file [phone] (home)

## 2022-09-01 DIAGNOSIS — Z79.4 TYPE 2 DIABETES MELLITUS WITH DIABETIC POLYNEUROPATHY, WITH LONG-TERM CURRENT USE OF INSULIN (H): ICD-10-CM

## 2022-09-01 DIAGNOSIS — E11.42 TYPE 2 DIABETES MELLITUS WITH DIABETIC POLYNEUROPATHY, WITH LONG-TERM CURRENT USE OF INSULIN (H): ICD-10-CM

## 2022-09-01 RX ORDER — METFORMIN HCL 500 MG
TABLET, EXTENDED RELEASE 24 HR ORAL
Qty: 360 TABLET | Refills: 1 | Status: SHIPPED | OUTPATIENT
Start: 2022-09-01 | End: 2023-05-23

## 2022-09-16 DIAGNOSIS — F32.1 MODERATE MAJOR DEPRESSION (H): ICD-10-CM

## 2022-09-16 RX ORDER — CITALOPRAM HYDROBROMIDE 20 MG/1
TABLET ORAL
Qty: 90 TABLET | Refills: 2 | Status: SHIPPED | OUTPATIENT
Start: 2022-09-16 | End: 2023-06-18

## 2022-09-18 DIAGNOSIS — I10 PRIMARY HYPERTENSION: ICD-10-CM

## 2022-09-18 RX ORDER — AMLODIPINE BESYLATE 2.5 MG/1
TABLET ORAL
Qty: 90 TABLET | Refills: 3 | Status: SHIPPED | OUTPATIENT
Start: 2022-09-18 | End: 2023-08-22

## 2022-10-26 DIAGNOSIS — I10 ESSENTIAL HYPERTENSION: ICD-10-CM

## 2022-10-26 RX ORDER — TORSEMIDE 20 MG/1
TABLET ORAL
Qty: 180 TABLET | Refills: 0 | Status: SHIPPED | OUTPATIENT
Start: 2022-10-26 | End: 2023-02-28

## 2022-11-21 ENCOUNTER — HOSPITAL ENCOUNTER (OUTPATIENT)
Dept: BONE DENSITY | Facility: HOSPITAL | Age: 77
Discharge: HOME | End: 2022-11-21
Attending: NURSE PRACTITIONER | Admitting: NURSE PRACTITIONER
Payer: COMMERCIAL

## 2022-11-21 DIAGNOSIS — Z78.0 POSTMENOPAUSAL STATUS: ICD-10-CM

## 2022-11-21 PROCEDURE — 77080 DXA BONE DENSITY AXIAL: CPT

## 2022-11-21 PROCEDURE — 77081 DXA BONE DENSITY APPENDICULR: CPT

## 2022-12-09 ENCOUNTER — TELEPHONE (OUTPATIENT)
Dept: INTERNAL MEDICINE | Facility: CLINIC | Age: 77
End: 2022-12-09

## 2022-12-09 NOTE — TELEPHONE ENCOUNTER
Please call patient -    ______________________________________________________________________     Home phone:  [phone] (home)     Cell phone:   Telephone Information:   Mobile [phone]       Other contacts:  Name Home Phone Work Phone Mobile Phone Relationship Lgl Sp   STACY ZAPATA [phone] JAY Sauceda   [phone] Son      ______________________________________________________________________     She is scheduled next Friday with me at 11:30 am.  Unfortunately she was scheduled at the same time as another patient.    Please see if the patient can be rescheduled to the same day at 2 pm instead.    Thanks for your understanding in advance!    Jonathan Perez MD  [facility]  12/9/2022, 9:36 AM

## 2023-01-11 ENCOUNTER — TELEPHONE (OUTPATIENT)
Dept: INTERNAL MEDICINE | Facility: CLINIC | Age: 78
End: 2023-01-11
Payer: COMMERCIAL

## 2023-01-11 DIAGNOSIS — E11.9 DM TYPE 2 (DIABETES MELLITUS, TYPE 2) (H): ICD-10-CM

## 2023-01-11 DIAGNOSIS — Z79.4 TYPE 2 DIABETES MELLITUS WITH DIABETIC POLYNEUROPATHY, WITH LONG-TERM CURRENT USE OF INSULIN (H): ICD-10-CM

## 2023-01-11 DIAGNOSIS — E11.42 TYPE 2 DIABETES MELLITUS WITH DIABETIC POLYNEUROPATHY, WITH LONG-TERM CURRENT USE OF INSULIN (H): ICD-10-CM

## 2023-01-12 RX ORDER — INSULIN GLARGINE 100 [IU]/ML
20 INJECTION, SOLUTION SUBCUTANEOUS AT BEDTIME
Qty: 15 ML | Refills: 1 | Status: SHIPPED | OUTPATIENT
Start: 2023-01-12 | End: 2023-06-04

## 2023-01-12 RX ORDER — PEN NEEDLE, DIABETIC 31 GX5/16"
NEEDLE, DISPOSABLE MISCELLANEOUS
Qty: 100 EACH | Refills: 3 | Status: SHIPPED | OUTPATIENT
Start: 2023-01-12 | End: 2024-02-23

## 2023-01-12 NOTE — TELEPHONE ENCOUNTER
"Routing refill request to provider for review/approval because:  Labs not current:  a1c  Patient needs to be seen because:  q6m    Last Written Prescription Date:  5/25/22  Last Fill Quantity: 15,  # refills: 1   Last office visit provider:  6/17/22     Requested Prescriptions   Pending Prescriptions Disp Refills     B-D U/F 31G X 8 MM insulin pen needle [Pharmacy Med Name: BD UF SHORT PEN NEEDLE [id number]] 100 each 3     Sig: USE DAILY AS DIRECTED       Diabetic Supplies Protocol Failed - 1/11/2023  9:07 AM        Failed - Recent (6 mo) or future (30 days) visit within the authorizing provider's specialty     Patient had office visit in the last 6 months or has a visit in the next 30 days with authorizing provider.  See \"Patient Info\" tab in inbasket, or \"Choose Columns\" in Meds & Orders section of the refill encounter.            Passed - Medication is active on med list        Passed - Patient is 18 years of age or older           LANTUS SOLOSTAR 100 UNIT/ML soln [Pharmacy Med Name: LANTUS SOLOSTAR 100 UNIT/ML]  1     Sig: INJECT 20 UNITS SUBCUTANEOUS AT BEDTIME DUE FOR FOLLOW UP IN NOVEMBER. SCHEDULE APPOINTMENT NOW!       Long Acting Insulin Protocol Failed - 1/11/2023  9:07 AM        Failed - HgbA1C in past 3 or 6 months     If HgbA1C is 8 or greater, it needs to be on file within the past 3 months.  If less than 8, must be on file within the past 6 months.     Recent Labs   Lab Test 06/17/22  1146   A1C 6.0*             Failed - Recent (6 mo) or future (30 days) visit within the authorizing provider's specialty     Patient had office visit in the last 6 months or has a visit in the next 30 days with authorizing provider or within the authorizing provider's specialty.  See \"Patient Info\" tab in inbasket, or \"Choose Columns\" in Meds & Orders section of the refill encounter.            Passed - Serum creatinine on file in past 12 months     Recent Labs   Lab Test 06/17/22  1146   CR 1.06       Ok to refill " medication if creatinine is low          Passed - Medication is active on med list        Passed - Patient is age 18 or older             Makenzie Freitas, RN 01/12/23 10:49 AM

## 2023-01-12 NOTE — TELEPHONE ENCOUNTER
Please call patient -    ______________________________________________________________________     Home phone:  [phone] (home)     Cell phone:   Telephone Information:   Mobile [phone]       Other contacts:  Name Home Phone Work Phone Mobile Phone Relationship Lgl Grd   STACY ZAPATA [phone] JAY Sauceda   [phone] Son      ______________________________________________________________________     We did refills for the following:    Orders Placed This Encounter   Medications     B-D U/F 31G X 8 MM insulin pen needle     Sig: USE DAILY AS DIRECTED     Dispense:  100 each     Refill:  3     insulin glargine (LANTUS SOLOSTAR) 100 UNIT/ML pen     Sig: Inject 20 Units Subcutaneous At Bedtime     Dispense:  15 mL     Refill:  1     She is due for follow up and annual wellness visit.  Please help her to schedule for February.    Jonathan Perez MD  [facility]  1/12/2023, 3:53 PM

## 2023-01-23 NOTE — TELEPHONE ENCOUNTER
Called and spoke with patient. Informed patient that her upcoming appt on 2/14 has been changed to a AWV and will also address diabetes follow up at this visit.     Patient was agreeable with this change.

## 2023-01-23 NOTE — TELEPHONE ENCOUNTER
Patient scheduled for an Office Visit on 2/14/23 with Dr. Perez. Change visit type to Annual Wellness visit instead?

## 2023-01-25 DIAGNOSIS — I10 ESSENTIAL HYPERTENSION: ICD-10-CM

## 2023-01-25 RX ORDER — LOSARTAN POTASSIUM 50 MG/1
TABLET ORAL
Qty: 90 TABLET | Refills: 1 | Status: SHIPPED | OUTPATIENT
Start: 2023-01-25 | End: 2023-07-27

## 2023-01-25 NOTE — TELEPHONE ENCOUNTER
"Routing refill request to provider for review/approval because:  bp out of range    Last Written Prescription Date:  7/31/22  Last Fill Quantity: 90,  # refills: 1   Last office visit provider:  6/17/22     Requested Prescriptions   Pending Prescriptions Disp Refills     losartan (COZAAR) 50 MG tablet [Pharmacy Med Name: LOSARTAN POTASSIUM 50 MG TAB] 90 tablet 1     Sig: TAKE 1 TABLET BY MOUTH EVERY DAY       Angiotensin-II Receptors Failed - 1/25/2023 12:35 AM        Failed - Last blood pressure under 140/90 in past 12 months     BP Readings from Last 3 Encounters:   06/17/22 (!) 168/82   06/09/22 (!) 188/80   03/28/22 (!) 178/70                 Passed - Recent (12 mo) or future (30 days) visit within the authorizing provider's specialty     Patient has had an office visit with the authorizing provider or a provider within the authorizing providers department within the previous 12 mos or has a future within next 30 days. See \"Patient Info\" tab in inbasket, or \"Choose Columns\" in Meds & Orders section of the refill encounter.              Passed - Medication is active on med list        Passed - Patient is age 18 or older        Passed - No active pregnancy on record        Passed - Normal serum creatinine on file in past 12 months     Recent Labs   Lab Test 06/17/22  1146   CR 1.06       Ok to refill medication if creatinine is low          Passed - Normal serum potassium on file in past 12 months     Recent Labs   Lab Test 06/17/22  1146   POTASSIUM 4.5                    Passed - No positive pregnancy test in past 12 months             Makenzie Freitas, RN 01/25/23 4:55 PM  "

## 2023-02-06 DIAGNOSIS — M54.16 LUMBAR RADICULAR PAIN: ICD-10-CM

## 2023-02-06 DIAGNOSIS — E78.2 MIXED HYPERLIPIDEMIA: ICD-10-CM

## 2023-02-06 RX ORDER — GABAPENTIN 300 MG/1
CAPSULE ORAL
Qty: 270 CAPSULE | Refills: 3 | Status: SHIPPED | OUTPATIENT
Start: 2023-02-06 | End: 2024-04-19

## 2023-02-06 RX ORDER — ATORVASTATIN CALCIUM 40 MG/1
TABLET, FILM COATED ORAL
Qty: 90 TABLET | Refills: 3 | Status: SHIPPED | OUTPATIENT
Start: 2023-02-06 | End: 2024-01-24

## 2023-02-14 ENCOUNTER — OFFICE VISIT (OUTPATIENT)
Dept: INTERNAL MEDICINE | Facility: CLINIC | Age: 78
End: 2023-02-14
Payer: COMMERCIAL

## 2023-02-14 VITALS
HEART RATE: 70 BPM | HEIGHT: 66 IN | BODY MASS INDEX: 28.99 KG/M2 | DIASTOLIC BLOOD PRESSURE: 64 MMHG | OXYGEN SATURATION: 98 % | WEIGHT: 180.4 LBS | SYSTOLIC BLOOD PRESSURE: 136 MMHG

## 2023-02-14 DIAGNOSIS — E11.42 DIABETIC PERIPHERAL NEUROPATHY (H): ICD-10-CM

## 2023-02-14 DIAGNOSIS — E11.42 TYPE 2 DIABETES MELLITUS WITH DIABETIC POLYNEUROPATHY, WITH LONG-TERM CURRENT USE OF INSULIN (H): ICD-10-CM

## 2023-02-14 DIAGNOSIS — E78.2 MIXED HYPERLIPIDEMIA: ICD-10-CM

## 2023-02-14 DIAGNOSIS — I10 PRIMARY HYPERTENSION: Chronic | ICD-10-CM

## 2023-02-14 DIAGNOSIS — G47.33 OSA (OBSTRUCTIVE SLEEP APNEA): Chronic | ICD-10-CM

## 2023-02-14 DIAGNOSIS — Z00.00 ENCOUNTER FOR MEDICARE ANNUAL WELLNESS EXAM: Primary | ICD-10-CM

## 2023-02-14 DIAGNOSIS — E11.29 MICROALBUMINURIA DUE TO TYPE 2 DIABETES MELLITUS (H): ICD-10-CM

## 2023-02-14 DIAGNOSIS — M79.605 LOW BACK PAIN RADIATING TO LEFT LEG: ICD-10-CM

## 2023-02-14 DIAGNOSIS — Z79.4 TYPE 2 DIABETES MELLITUS WITH DIABETIC POLYNEUROPATHY, WITH LONG-TERM CURRENT USE OF INSULIN (H): ICD-10-CM

## 2023-02-14 DIAGNOSIS — M54.50 LOW BACK PAIN RADIATING TO LEFT LEG: ICD-10-CM

## 2023-02-14 DIAGNOSIS — R80.9 MICROALBUMINURIA DUE TO TYPE 2 DIABETES MELLITUS (H): ICD-10-CM

## 2023-02-14 DIAGNOSIS — M48.062 SPINAL STENOSIS OF LUMBAR REGION WITH NEUROGENIC CLAUDICATION: ICD-10-CM

## 2023-02-14 DIAGNOSIS — F33.41 RECURRENT MAJOR DEPRESSIVE DISORDER, IN PARTIAL REMISSION (H): Chronic | ICD-10-CM

## 2023-02-14 DIAGNOSIS — N18.31 STAGE 3A CHRONIC KIDNEY DISEASE (H): ICD-10-CM

## 2023-02-14 LAB
ALBUMIN SERPL BCG-MCNC: 4.4 G/DL (ref 3.5–5.2)
ALP SERPL-CCNC: 87 U/L (ref 35–104)
ALT SERPL W P-5'-P-CCNC: 14 U/L (ref 10–35)
ANION GAP SERPL CALCULATED.3IONS-SCNC: 13 MMOL/L (ref 7–15)
AST SERPL W P-5'-P-CCNC: 22 U/L (ref 10–35)
BILIRUB SERPL-MCNC: 0.3 MG/DL
BUN SERPL-MCNC: 25.4 MG/DL (ref 8–23)
CALCIUM SERPL-MCNC: 9.9 MG/DL (ref 8.8–10.2)
CHLORIDE SERPL-SCNC: 93 MMOL/L (ref 98–107)
CHOLEST SERPL-MCNC: 161 MG/DL
CREAT SERPL-MCNC: 1.07 MG/DL (ref 0.51–0.95)
DEPRECATED HCO3 PLAS-SCNC: 23 MMOL/L (ref 22–29)
GFR SERPL CREATININE-BSD FRML MDRD: 53 ML/MIN/1.73M2
GLUCOSE SERPL-MCNC: 79 MG/DL (ref 70–99)
HBA1C MFR BLD: 6.3 % (ref 0–5.6)
HDLC SERPL-MCNC: 55 MG/DL
HGB BLD-MCNC: 11.4 G/DL (ref 11.7–15.7)
LDLC SERPL CALC-MCNC: 84 MG/DL
NONHDLC SERPL-MCNC: 106 MG/DL
POTASSIUM SERPL-SCNC: 5.5 MMOL/L (ref 3.4–5.3)
PROT SERPL-MCNC: 7.2 G/DL (ref 6.4–8.3)
SODIUM SERPL-SCNC: 129 MMOL/L (ref 136–145)
TRIGL SERPL-MCNC: 112 MG/DL

## 2023-02-14 PROCEDURE — 0124A COVID-19 VACCINE BIVALENT BOOSTER 12+ (PFIZER): CPT | Performed by: INTERNAL MEDICINE

## 2023-02-14 PROCEDURE — 82043 UR ALBUMIN QUANTITATIVE: CPT | Performed by: INTERNAL MEDICINE

## 2023-02-14 PROCEDURE — 82570 ASSAY OF URINE CREATININE: CPT | Performed by: INTERNAL MEDICINE

## 2023-02-14 PROCEDURE — 99207 PR FOOT EXAM NO CHARGE: CPT | Mod: 25 | Performed by: INTERNAL MEDICINE

## 2023-02-14 PROCEDURE — 90662 IIV NO PRSV INCREASED AG IM: CPT | Performed by: INTERNAL MEDICINE

## 2023-02-14 PROCEDURE — 36415 COLL VENOUS BLD VENIPUNCTURE: CPT | Performed by: INTERNAL MEDICINE

## 2023-02-14 PROCEDURE — 83036 HEMOGLOBIN GLYCOSYLATED A1C: CPT | Performed by: INTERNAL MEDICINE

## 2023-02-14 PROCEDURE — 80053 COMPREHEN METABOLIC PANEL: CPT | Performed by: INTERNAL MEDICINE

## 2023-02-14 PROCEDURE — G0439 PPPS, SUBSEQ VISIT: HCPCS | Performed by: INTERNAL MEDICINE

## 2023-02-14 PROCEDURE — 85018 HEMOGLOBIN: CPT | Performed by: INTERNAL MEDICINE

## 2023-02-14 PROCEDURE — 99214 OFFICE O/P EST MOD 30 MIN: CPT | Mod: 25 | Performed by: INTERNAL MEDICINE

## 2023-02-14 PROCEDURE — 80061 LIPID PANEL: CPT | Performed by: INTERNAL MEDICINE

## 2023-02-14 PROCEDURE — 91312 COVID-19 VACCINE BIVALENT BOOSTER 12+ (PFIZER): CPT | Performed by: INTERNAL MEDICINE

## 2023-02-14 PROCEDURE — G0008 ADMIN INFLUENZA VIRUS VAC: HCPCS | Performed by: INTERNAL MEDICINE

## 2023-02-14 ASSESSMENT — PATIENT HEALTH QUESTIONNAIRE - PHQ9
SUM OF ALL RESPONSES TO PHQ QUESTIONS 1-9: 9
SUM OF ALL RESPONSES TO PHQ QUESTIONS 1-9: 9
10. IF YOU CHECKED OFF ANY PROBLEMS, HOW DIFFICULT HAVE THESE PROBLEMS MADE IT FOR YOU TO DO YOUR WORK, TAKE CARE OF THINGS AT HOME, OR GET ALONG WITH OTHER PEOPLE: SOMEWHAT DIFFICULT

## 2023-02-14 NOTE — PROGRESS NOTES
SUBJECTIVE:   Rhea is a 77 year old who presents for Preventive Visit.    Patient has been advised of split billing requirements and indicates understanding: Yes  Are you in the first 12 months of your Medicare coverage?  No    History of Present Illness       Diabetes:   She presents for follow up of diabetes.  She is checking home blood glucose one time daily. She checks blood glucose before meals.  Blood glucose is never over 200 and sometimes under 70. When her blood glucose is low, the patient is asymptomatic for confusion, blurred vision, lethargy and reports not feeling dizzy, shaky, or weak.  She has no concerns regarding her diabetes at this time.  She is having blurry vision and weight loss. The patient has not had a diabetic eye exam in the last 12 months.          Today's PHQ-9         PHQ-9 Total Score: 9    PHQ-9 Q9 Thoughts of better off dead/self-harm past 2 weeks :   Not at all    How difficult have these problems made it for you to do your work, take care of things at home, or get along with other people: Somewhat difficult      Have you ever done Advance Care Planning? (For example, a Health Directive, POLST, or a discussion with a medical provider or your loved ones about your wishes): Yes, advance care planning is on file.    Fall risk  Fallen 2 or more times in the past year?: No  Any fall with injury in the past year?: No  click delete button to remove this line now  Cognitive Screening   1) Repeat 3 items (Leader, Season, Table)    2) Clock draw: NORMAL  3) 3 item recall: Recalls 3 objects  Results: 3 items recalled: COGNITIVE IMPAIRMENT LESS LIKELY    Mini-CogTM Copyright KEVIN Whitten. Licensed by the author for use in [facility]; reprinted with permission (catherine@.[email or web address]). All rights reserved.

## 2023-02-14 NOTE — LETTER
2/15/2023    Rhea Jasmine   [address] [place] [address]         Dear Rhea,    It was good to see you in clinic.  I hope your questions were answered at the time of your visit.    The results of your tests from your visit were as follows:    Resulted Orders   HEMOGLOBIN A1C   Result Value Ref Range    Hemoglobin A1C 6.3 (H) 0.0 - 5.6 %   Hemoglobin   Result Value Ref Range    Hemoglobin 11.4 (L) 11.7 - 15.7 g/dL   Albumin Random Urine Quantitative with Creat Ratio   Result Value Ref Range    Creatinine Urine mg/dL 44.4 mg/dL    Albumin Urine mg/L 65.2 mg/L    Albumin Urine mg/g Cr 146.85 (H) 0.00 - 25.00 mg/g Cr   Lipid panel reflex to direct LDL Non-fasting   Result Value Ref Range    Cholesterol 161 <200 mg/dL    Triglycerides 112 <150 mg/dL    Direct Measure HDL 55 >=50 mg/dL    LDL Cholesterol Calculated 84 <=100 mg/dL   Comprehensive metabolic panel   Result Value Ref Range    Sodium 129 (L) 136 - 145 mmol/L    Potassium 5.5 (H) 3.4 - 5.3 mmol/L    Chloride 93 (L) 98 - 107 mmol/L    Carbon Dioxide (CO2) 23 22 - 29 mmol/L    Anion Gap 13 7 - 15 mmol/L    Urea Nitrogen 25.4 (H) 8.0 - 23.0 mg/dL    Creatinine 1.07 (H) 0.51 - 0.95 mg/dL    Calcium 9.9 8.8 - 10.2 mg/dL    Glucose 79 70 - 99 mg/dL    Alkaline Phosphatase 87 35 - 104 U/L    AST 22 10 - 35 U/L    ALT 14 10 - 35 U/L    Protein Total 7.2 6.4 - 8.3 g/dL    Albumin 4.4 3.5 - 5.2 g/dL    Bilirubin Total 0.3 <=1.2 mg/dL    GFR Estimate 53 (L) >60 mL/min/1.73m2     Your diabetes is doing very well and your A1c is in goal.    Your sodium and your kidney function are stable.  Your liver tests are good.    Your potassium is higher.  Please decrease your intake of bananas, tomatoes or tomato products, citrus fruits and juices, and potato products.      Your cholesterol is doing well.  Your red blood cell count is stable.    You have some protein in your urine related to your diabetes.  However, this is better than in the past.    Please follow up  again in 6 months, sooner if issues.    If you have any questions regarding these results, please feel free to contact me at [phone].  I wish you the best of health!      Sincerely,       Jonathan Perez MD  General Internal Medicine  [facility]

## 2023-02-14 NOTE — PATIENT INSTRUCTIONS
"No future appointments.     Patient Education   Personalized Prevention Plan  You are due for the preventive services outlined below.  Your care team is available to assist you in scheduling these services.  If you have already completed any of these items, please share that information with your care team to update in your medical record.  Health Maintenance Due   Topic Date Due     ANNUAL REVIEW OF  ORDERS  Never done     LUNG CANCER SCREENING  Never done     Eye Exam  10/27/2021       Depression and Suicide in Older Adults    Nearly 2 million older Americans have some type of depression. Some of them even take their own lives. Yet depression among older adults is often ignored. Learn the warning signs. You may help spare a loved one needless pain. You may also save a life.   What is depression?  Depression is a common and serious illness that affects the way you think and feel. It is not a normal part of aging, nor is it a sign of weakness, a character flaw, or something you can snap out of. Most people with depression need treatment to get better. The most common symptom is a feeling of deep sadness. People who are depressed also may seem tired and listless. And nothing seems to give them pleasure. It s normal to grieve or be sad sometimes. But sadness lessens or passes with time. Depression rarely goes away or improves on its own. A person with clinical depression can't \"snap out of it.\" Other symptoms of depression are:     Sleeping more or less than normal    Eating more or less than normal    Having headaches, stomachaches, or other pains that don t go away    Feeling nervous,  empty,  or worthless    Crying a great deal    Thinking or talking about suicide or death    Loss of interest in activities previously enjoyed    Social isolation    Feeling confused or forgetful  What causes it?  The causes of depression aren t fully known. But it is thought to result from a complex blend of these factors: "     Biochemistry. Certain chemicals in the brain play a role.    Genes. Depression does run in families.    Life stress. Life stresses can also trigger depression in some people. Older adults often face many stressors, such as death of friends or a spouse, health problems, and financial concerns.    Chronic conditions. This includes conditions such as diabetes, heart disease, or cancer. These can cause symptoms of depression. Medicine side effects can cause changes in thoughts and behaviors.  How you can help  Often, depressed people may not want to ask for help. When they do, they may be ignored. Or, they may receive the wrong treatment. You can help by showing parents and older friends love and support. If they seem depressed, don t lecture the person, ignore the symptoms, or discount the symptoms as a  normal  part of aging -which they are not. Get involved, listen, and show interest and support.   Help them understand that depression is a treatable illness. Tell them you can help them find the right treatment. Offer to go to their healthcare provider's appointment with them for support when the symptoms are discussed. With their approval, contact a local mental health center, social service agency, or hospital about services.   You can be an advocate for him or her at healthcare appointments. Many older adults have chronic illnesses that can cause symptoms of depression. Medicine side effects can change thoughts and behaviors. You can help make sure that the healthcare provider looks at all of these factors. He or she should refer your family member or friend to a mental healthcare provider when needed. in some cases, untreated depression can lead to a misdiagnosis. A person may be diagnosed with a brain disorder such as dementia. If the healthcare provider does not take the issue of depression seriously, help your family member or friend to find another provider.   Don't be afraid to ask  If you think an older  person you care about could be suicidal, ask,  Have you thought about suicide?  Most people will tell you the truth. If they say  yes,  they may already have a plan for how and when they will attempt it. Find out as much as you can. The more detailed the plan, and the easier it is to carry out, the more danger the person is in right now. Tell the person you are there for them and do not want them to harm him or herself. Don't wait to get help for the person. Call the person's healthcare provider, local hospital, or emergency services.   To learn more    National Suicide Prevention Lifeline (crisis hotline) 964-666-BLDV (947-500-0146)    National [place] of Mental Ivnqcb804-556-5750uix.[facility].nih.gov    National [place] on Mental Esnvozx174-107-7300zxf.ange.org    Mental Health Eanavik138-954-3804jhn.[facility].org    National Suicide Spfitpc119-MSYRCBC (924-897-3320)    Call 911  Never leave the person alone. A person who is actively suicidal needs psychiatric care right away. They will need constant supervision. Never leave the person out of sight. Call 911 or the national 24-hour suicide crisis hotline at 614-467-RBRH (493-040-7622). You can also take the person to the closest emergency room.   [employer] last reviewed this educational content on 5/1/2020 2000-2021 [employer]. All rights reserved. This information is not intended as a substitute for professional medical care. Always follow your healthcare professional's instructions.

## 2023-02-15 PROBLEM — M48.061 LUMBAR SPINAL STENOSIS: Status: ACTIVE | Noted: 2023-02-15

## 2023-02-15 LAB
CREAT UR-MCNC: 44.4 MG/DL
MICROALBUMIN UR-MCNC: 65.2 MG/L
MICROALBUMIN/CREAT UR: 146.85 MG/G CR (ref 0–25)

## 2023-02-15 NOTE — PROGRESS NOTES
[facility] - Primary [facility] Specialists    Comprehensive and complex medical care - Chronic disease management - Shared decision making - Care coordination - Compassionate care    Patient advocacy - Rational deprescribing - Minimally disruptive medicine - Ethical focus - Customized care         Date of Service: 2/14/2023  Primary Provider: Jonathan Perez    Patient Care Team:  Jonathan Perez MD as PCP - General  Jonathan Perez MD as Assigned PCP  Speedy Torres MD as Jim Mims Dpm, SINGHM (Inactive) as MD          Patient's Pharmacy:    [facility] [id number] [facility] - [place], [place] - [address]  [address]  [place] [address]  Phone: [phone] Fax: [phone]     Patient's Contacts:  Name Home Phone Work Phone Mobile Phone Relationship Lgl STACY Sousa [phone] Son    JAY ZAPATA   [phone] Son      Patient's Insurance:    Payor: [facility] / Plan: [employer] MEDICARE / Product Type: HMO /      Subjective:     History of present illness:    Rhea Zapata is an 77 year old here for an annual wellness visit.    The issues she would like to address at today's visit include the following:    Chief Complaint   Patient presents with     Annual Visit     And  follow up DM       Patient comes in today for annual wellness visit and for other issues.    She has generally been doing well overall for her.  She has recovered from her right clavicle fracture but still has little things that bother her with this at time to time.    She has been able to do some shoveling of the snow.    We reviewed her diabetes.  We will check up on this today.      Her blood pressure was a little elevated when she first came in but was better on recheck today.    She has had problems with her back still.  She has known central and foraminal stenosis from remote MRI.  It might be a bit worse.  It is mainly with walking or standing.  We reviewed options to look  at this further if needed but she is okay with watching at this time.    Her last eye exam was in November 2021    She denies any cardiac symptoms.    We reviewed her depression and she continues to take the loss of family and friends quite significantly.    We reviewed her other issues noted in the assessment but not specifically addressed in the HPI above.           Active Problem List:  Problem List as of 2/14/2023 Reviewed: 6/19/2022  9:31 PM by Jonathan Perez MD       High    DNR (do not resuscitate)       Medium    Moderate major depression (H)    Stage 3a chronic kidney disease (H)       Low    Low back pain radiating to left leg    Tubular adenoma of colon - 2017 - single 9 mm    Mixed incontinence    Microalbuminuria due to type 2 diabetes mellitus (H)    On angiotensin receptor blockers (ARB)    Diabetic peripheral neuropathy (H)       Other    SHIV (obstructive sleep apnea)       Other    Tobacco abuse    DM type 2 (diabetes mellitus, type 2) - C peptide 2.4 (2018) (H)       Other    PN (peripheral neuropathy)    HTN (hypertension)       Other    HLD (hyperlipidemia)    OA (osteoarthritis)        Past Medical History:   Diagnosis Date     CKD (chronic kidney disease) stage 3, GFR 30-59 ml/min (H)      DM type 2 (diabetes mellitus, type 2) (H)      Dysthymia      HLD (hyperlipidemia)      HTN (hypertension)      Lumbar spinal stenosis 2/15/2023     OA (osteoarthritis)      SHIV (obstructive sleep apnea)     On CPAP     PN (peripheral neuropathy)      Tobacco abuse      Tubular adenoma of colon       Past Surgical History:   Procedure Laterality Date     SEPTOPLASTY       SHOULDER SURGERY   2008     TOTAL SHOULDER ARTHROPLASTY Left 2010     TYMPANOSTOMY TUBE PLACEMENT        Family History   Problem Relation Age of Onset     Colon Cancer Mother      Heart Failure Mother      Diabetes Mother      Diabetes Father      Heart Disease Father      Atrial fibrillation Sister      Obesity Sister      Sarcoidosis  Sister      Heart Disease Brother      Cancer Brother         BLADDER     Diabetes Brother      Diabetes Brother      Colon Polyps Son      Colon Polyps Son       Family history is otherwise noncontributory.    Social History     Occupational History     Not on file   Tobacco Use     Smoking status: Every Day     Smokeless tobacco: Never   Vaping Use     Vaping Use: Never used   Substance and Sexual Activity     Alcohol use: Yes     Drug use: No     Sexual activity: Not on file      Social History     Social History Narrative    Retired nursing assistant.    Previous patient of Dr. Ghotra.    Goes to [facility] for exercise.        Current Outpatient Medications   Medication Instructions     acetaminophen (TYLENOL) 1,000 mg, PRN     amLODIPine (NORVASC) 2.5 MG tablet TAKE 1 TABLET BY MOUTH EVERY DAY     aspirin (ASA) 81 mg, DAILY     atorvastatin (LIPITOR) 40 MG tablet TAKE 1 TABLET BY MOUTH EVERYDAY AT BEDTIME     B-D U/F 31G X 8 MM insulin pen needle USE DAILY AS DIRECTED     citalopram (CELEXA) 20 MG tablet TAKE 1 TABLET BY MOUTH EVERY DAY     fluticasone (FLONASE) 50 mcg/actuation nasal spray 2 sprays, DAILY     gabapentin (NEURONTIN) 300 MG capsule TAKE 1 CAPSULE BY MOUTH THREE TIMES A DAY     insulin needles, disposable, 31 Ndle [INSULIN NEEDLES, DISPOSABLE, 31 NDLE] Use As Directed. Use 1x/day     lancets (TRUEPLUS LANCETS) 28 gauge Misc 3 TIMES DAILY     Lantus SoloStar 20 Units, Subcutaneous, AT BEDTIME     losartan (COZAAR) 50 MG tablet TAKE 1 TABLET BY MOUTH EVERY DAY     magnesium oxide (MAG-OX) 400 mg (241.3 mg magnesium) tablet 1 tablet, Oral, 2 TIMES DAILY     MAGNESIUM OXIDE 400 (240 Mg) MG tablet TAKE 1 TABLET BY MOUTH TWICE A DAY     metFORMIN (GLUCOPHAGE XR) 500 MG 24 hr tablet TAKE 4 TABLETS (2,000 MG) BY MOUTH DAILY OVERDUE FOR VISIT. MAKE APPOINTMENT IN DECEMBER.     metoprolol succinate ER (TOPROL-XL) 50 MG 24 hr tablet TAKE 1 TABLET BY MOUTH EVERY DAY     ONETOUCH VERIO IQ  "test strip USE 1 EACH AS DIRECTED 2 (TWO) TIMES A DAY.     oxybutynin ER (DITROPAN XL) 10 mg, Oral, AT BEDTIME     torsemide (DEMADEX) 20 MG tablet TAKE 2 TABLETS BY MOUTH EVERY DAY     Vitamin D3 (CHOLECALCIFEROL) 1,000 Units, DAILY     vitamin E (TOCOPHEROL) 400 Units, DAILY      Allergies: Penicillins and Percocet [oxycodone-acetaminophen]     Immunization History   Administered Date(s) Administered     COVID-19 Vaccine 12+ (Pfizer 2022) 06/17/2022     COVID-19 Vaccine 12+ (Pfizer) 05/24/2021, 06/14/2021, 12/20/2021     COVID-19 Vaccine Bivalent Booster 12+ (Pfizer) 02/14/2023     DT (PEDS <7y) 10/01/1995, 04/27/2005     Flu, Unspecified 10/27/2007, 09/27/2011     Influenza (High Dose) 3 valent vaccine 11/03/2014, 02/24/2017, 09/08/2017, 01/14/2019, 11/21/2019     Influenza (IIV3) PF 10/11/2004, 10/12/2005, 10/18/2006, 10/27/2007, 12/12/2008, 09/27/2011, 09/27/2012     Influenza Vaccine 65+ (Fluzone HD) 09/21/2020, 08/18/2021, 02/14/2023     Influenza Vaccine, 6+MO IM (QUADRIVALENT W/PRESERVATIVES) 12/12/2008, 09/27/2012     Pneumococcal 23 valent 06/14/2006, 09/01/2011, 08/18/2021     Tdap (Adacel,Boostrix) 03/06/2009, 05/14/2019     Zoster vaccine recombinant adjuvanted (SHINGRIX) 05/14/2019, 07/31/2019     Zoster vaccine, live 03/06/2009      Objective:     Wt Readings from Last 3 Encounters:   02/14/23 81.8 kg (180 lb 6.4 oz)   06/17/22 80.3 kg (177 lb)   06/09/22 82.1 kg (181 lb)     BP Readings from Last 3 Encounters:   02/14/23 136/64   06/17/22 (!) 168/82   06/09/22 (!) 188/80       PHYSICAL EXAM  /64   Pulse 70   Ht 1.676 m (5' 6\")   Wt 81.8 kg (180 lb 6.4 oz)   SpO2 98%   BMI 29.12 kg/m     The patient is comfortable, no acute distress.  Mood flat.  Insight is good.  No skin lesions or nodules of concern.  Ears clear.  Eyes are nonicteric.  Pupils equal and reactive.  Throat is clear.  Neck is supple without mass, no thyromegaly. No cervical or epitrochlear adenopathy.  No axillary lymph " nodes. Heart regular rate and rhythm.  Lungs clear to auscultation bilaterally.  Respiratory effort good.  Abdomen soft and nontender.  No hepatosplenomegaly.  Extremities show no edema.  Gait is slow.  Foot exam okay.    Diagnostics:     Office Visit on 06/17/2022   Component Date Value Ref Range Status     Hemoglobin A1C 06/17/2022 6.0 (H)  0.0 - 5.6 % Final    Normal <5.7%   Prediabetes 5.7-6.4%    Diabetes 6.5% or higher     Note: Adopted from ADA consensus guidelines.     Sodium 06/17/2022 129 (L)  136 - 145 mmol/L Final     Potassium 06/17/2022 4.5  3.5 - 5.0 mmol/L Final     Chloride 06/17/2022 95 (L)  98 - 107 mmol/L Final     Carbon Dioxide (CO2) 06/17/2022 19 (L)  22 - 31 mmol/L Final     Anion Gap 06/17/2022 15  5 - 18 mmol/L Final     Urea Nitrogen 06/17/2022 21  8 - 28 mg/dL Final     Creatinine 06/17/2022 1.06  0.60 - 1.10 mg/dL Final     Calcium 06/17/2022 9.7  8.5 - 10.5 mg/dL Final     Glucose 06/17/2022 77  70 - 125 mg/dL Final     GFR Estimate 06/17/2022 54 (L)  >60 mL/min/1.73m2 Final    Effective December 21, 2021 eGFRcr in adults is calculated using the 2021 CKD-EPI creatinine equation which includes age and gender (Maranda teran al., NEJ, DOI: 10.1056/RRKKfv5982220)       Results for orders placed or performed in visit on 02/14/23   HEMOGLOBIN A1C     Status: Abnormal   Result Value Ref Range    Hemoglobin A1C 6.3 (H) 0.0 - 5.6 %   Hemoglobin     Status: Abnormal   Result Value Ref Range    Hemoglobin 11.4 (L) 11.7 - 15.7 g/dL   Albumin Random Urine Quantitative with Creat Ratio     Status: Abnormal   Result Value Ref Range    Creatinine Urine mg/dL 44.4 mg/dL    Albumin Urine mg/L 65.2 mg/L    Albumin Urine mg/g Cr 146.85 (H) 0.00 - 25.00 mg/g Cr   Lipid panel reflex to direct LDL Non-fasting     Status: Normal   Result Value Ref Range    Cholesterol 161 <200 mg/dL    Triglycerides 112 <150 mg/dL    Direct Measure HDL 55 >=50 mg/dL    LDL Cholesterol Calculated 84 <=100 mg/dL    Non HDL  Cholesterol 106 <130 mg/dL    Narrative    Cholesterol  Desirable:  <200 mg/dL    Triglycerides  Normal:  Less than 150 mg/dL  Borderline High:  150-199 mg/dL  High:  200-499 mg/dL  Very High:  Greater than or equal to 500 mg/dL    Direct Measure HDL  Female:  Greater than or equal to 50 mg/dL   Male:  Greater than or equal to 40 mg/dL    LDL Cholesterol  Desirable:  <100mg/dL  Above Desirable:  100-129 mg/dL   Borderline High:  130-159 mg/dL   High:  160-189 mg/dL   Very High:  >= 190 mg/dL    Non HDL Cholesterol  Desirable:  130 mg/dL  Above Desirable:  130-159 mg/dL  Borderline High:  160-189 mg/dL  High:  190-219 mg/dL  Very High:  Greater than or equal to 220 mg/dL   Comprehensive metabolic panel     Status: Abnormal   Result Value Ref Range    Sodium 129 (L) 136 - 145 mmol/L    Potassium 5.5 (H) 3.4 - 5.3 mmol/L    Chloride 93 (L) 98 - 107 mmol/L    Carbon Dioxide (CO2) 23 22 - 29 mmol/L    Anion Gap 13 7 - 15 mmol/L    Urea Nitrogen 25.4 (H) 8.0 - 23.0 mg/dL    Creatinine 1.07 (H) 0.51 - 0.95 mg/dL    Calcium 9.9 8.8 - 10.2 mg/dL    Glucose 79 70 - 99 mg/dL    Alkaline Phosphatase 87 35 - 104 U/L    AST 22 10 - 35 U/L    ALT 14 10 - 35 U/L    Protein Total 7.2 6.4 - 8.3 g/dL    Albumin 4.4 3.5 - 5.2 g/dL    Bilirubin Total 0.3 <=1.2 mg/dL    GFR Estimate 53 (L) >60 mL/min/1.73m2       Assessment:     1. Encounter for Medicare annual wellness exam    2. Microalbuminuria due to type 2 diabetes mellitus (H)   Continue on ARB.  Continue to monitor.   3. Stage 3a chronic kidney disease (H)   No signs of progression.  Continue to monitor.   4. Type 2 diabetes mellitus with diabetic polyneuropathy, with long-term current use of insulin (H)   Continue current treatment plan.  Continue to monitor.   5. Spinal stenosis of lumbar region with neurogenic claudication    6. Recurrent major depressive disorder, in partial remission (H)   Continue current medications   7. Primary hypertension    8. SHIV (obstructive sleep  apnea)    9. Diabetic peripheral neuropathy (H)   Not worsening.  Continue to monitor.   10. Low back pain radiating to left leg    11. Mixed hyperlipidemia         Plan:     1. Blood work and urine test done today.  2. COVID booster done.  Flu shot done.  3. Could consider reducing her diabetes regimen a bit in the future if needed.  Patient is okay with current management plan.  4. Could consider further evaluation of her back in the future especially if it worsens more.  5. Reminded on eye exam.  6. Has declined lung cancer screening.  Continue to address.  7. Consider increasing amlodipine in the future if blood pressure remains high.  8. Continue current medications  9. Follow up sooner if issues.    Orders Placed This Encounter   Procedures     VT FOOT EXAM NO CHARGE     COVID-19 VACCINE BIVALENT BOOSTER 12+ (PFIZER)     INFLUENZA VACCINE 65+ (FLUZONE HD)     HEMOGLOBIN A1C     Hemoglobin     Albumin Random Urine Quantitative with Creat Ratio     Lipid panel reflex to direct LDL Non-fasting     Comprehensive metabolic panel        A personalized health plan based on the identified health risks was provided to the patient on the AVS.       Jonathan Perez MD  General Internal Medicine  [facility]      Return in about 6 months (around 8/14/2023).     No future appointments.      Health Maintenance   Topic Date Due     ANNUAL REVIEW OF  ORDERS  Never done     LUNG CANCER SCREENING  Never done     EYE EXAM  10/27/2021     A1C  08/14/2023     PHQ-9  08/14/2023     NICOTINE/TOBACCO CESSATION COUNSELING Q 1 YR  02/14/2024     MEDICARE ANNUAL WELLNESS VISIT  02/14/2024     BMP  02/14/2024     MICROALBUMIN  02/14/2024     DIABETIC FOOT EXAM  02/14/2024     FALL RISK ASSESSMENT  02/14/2024     HEMOGLOBIN  02/14/2024     LIPID  02/14/2026     ADVANCE CARE PLANNING  02/14/2028     DTAP/TDAP/TD IMMUNIZATION (3 - Td or Tdap) 05/14/2029     DEXA  11/21/2037     INFLUENZA VACCINE  Completed      URINALYSIS  Completed     ZOSTER IMMUNIZATION  Completed     COVID-19 Vaccine  Completed     DEPRESSION ACTION PLAN  Addressed     IPV IMMUNIZATION  Aged Out     MENINGITIS IMMUNIZATION  Aged Out     HEPATITIS C SCREENING  Discontinued     Pneumococcal Vaccine: 65+ Years  Discontinued     COLORECTAL CANCER SCREENING  Discontinued          The patient's PHQ-9 score is consistent with mild depression. She was provided with information regarding depression and was advised to schedule a follow up appointment in 26 weeks to further address this issue.

## 2023-02-28 DIAGNOSIS — I10 ESSENTIAL HYPERTENSION: ICD-10-CM

## 2023-03-01 RX ORDER — TORSEMIDE 20 MG/1
40 TABLET ORAL DAILY
Qty: 180 TABLET | Refills: 1 | Status: SHIPPED | OUTPATIENT
Start: 2023-03-01 | End: 2023-08-29

## 2023-03-01 NOTE — TELEPHONE ENCOUNTER
"Routing refill request to provider for review/approval because:  Labs out of range:  Cr, k, na    Last Written Prescription Date:  10/26/22  Last Fill Quantity: 180,  # refills: 0   Last office visit provider:  2/14/23     Requested Prescriptions   Pending Prescriptions Disp Refills     torsemide (DEMADEX) 20 MG tablet 180 tablet 0     Sig: Take 2 tablets (40 mg) by mouth daily       Diuretics (Including Combos) Protocol Failed - 2/28/2023 12:31 PM        Failed - Normal serum creatinine on file in past 12 months     Recent Labs   Lab Test 02/14/23  1350   CR 1.07*              Failed - Normal serum potassium on file in past 12 months     Recent Labs   Lab Test 02/14/23  1350   POTASSIUM 5.5*                    Failed - Normal serum sodium on file in past 12 months     Recent Labs   Lab Test 02/14/23  1350   *              Passed - Blood pressure under 140/90 in past 12 months     BP Readings from Last 3 Encounters:   02/14/23 136/64   06/17/22 (!) 168/82   06/09/22 (!) 188/80                 Passed - Recent (12 mo) or future (30 days) visit within the authorizing provider's specialty     Patient has had an office visit with the authorizing provider or a provider within the authorizing providers department within the previous 12 mos or has a future within next 30 days. See \"Patient Info\" tab in inbasket, or \"Choose Columns\" in Meds & Orders section of the refill encounter.              Passed - Medication is active on med list        Passed - Patient is age 18 or older        Passed - No active pregancy on record        Passed - No positive pregnancy test in past 12 months             Makenzie Freitas RN 03/01/23 3:03 PM  "

## 2023-04-26 DIAGNOSIS — I10 ESSENTIAL HYPERTENSION: ICD-10-CM

## 2023-04-26 RX ORDER — METOPROLOL SUCCINATE 50 MG/1
TABLET, EXTENDED RELEASE ORAL
Qty: 90 TABLET | Refills: 3 | Status: SHIPPED | OUTPATIENT
Start: 2023-04-26 | End: 2024-04-19

## 2023-05-23 DIAGNOSIS — Z79.4 TYPE 2 DIABETES MELLITUS WITH DIABETIC POLYNEUROPATHY, WITH LONG-TERM CURRENT USE OF INSULIN (H): ICD-10-CM

## 2023-05-23 DIAGNOSIS — E11.42 TYPE 2 DIABETES MELLITUS WITH DIABETIC POLYNEUROPATHY, WITH LONG-TERM CURRENT USE OF INSULIN (H): ICD-10-CM

## 2023-05-23 RX ORDER — METFORMIN HCL 500 MG
TABLET, EXTENDED RELEASE 24 HR ORAL
Qty: 360 TABLET | Refills: 0 | Status: SHIPPED | OUTPATIENT
Start: 2023-05-23 | End: 2023-08-27

## 2023-05-24 NOTE — TELEPHONE ENCOUNTER
"Last Written Prescription Date:  9/1/22  Last Fill Quantity: 360,  # refills: 1   Last office visit provider:  2/14/23     Requested Prescriptions   Pending Prescriptions Disp Refills     metFORMIN (GLUCOPHAGE XR) 500 MG 24 hr tablet [Pharmacy Med Name: METFORMIN HCL  MG TABLET] 360 tablet 1     Sig: TAKE 4 TABLETS (2,000 MG) BY MOUTH DAILY OVERDUE FOR VISIT. MAKE APPOINTMENT IN DECEMBER.       Biguanide Agents Passed - 5/23/2023 12:42 AM        Passed - Patient is age 10 or older        Passed - Patient has documented A1c within the specified period of time.     If HgbA1C is 8 or greater, it needs to be on file within the past 3 months.  If less than 8, must be on file within the past 6 months.     Recent Labs   Lab Test 02/14/23  1350   A1C 6.3*             Passed - Patient's CR is NOT>1.4 OR Patient's EGFR is NOT<45 within past 12 mos.     Recent Labs   Lab Test 02/14/23  1350 11/30/21  1424 02/23/21  1059   GFRESTIMATED 53*   < > 52*   GFRESTBLACK  --   --  >60    < > = values in this interval not displayed.       Recent Labs   Lab Test 02/14/23  1350   CR 1.07*             Passed - Patient does NOT have a diagnosis of CHF.        Passed - Medication is active on med list        Passed - Patient is not pregnant        Passed - Patient has not had a positive pregnancy test within the past 12 mos.         Passed - Recent (6 mo) or future (30 days) visit within the authorizing provider's specialty     Patient had office visit in the last 6 months or has a visit in the next 30 days with authorizing provider or within the authorizing provider's specialty.  See \"Patient Info\" tab in inbasket, or \"Choose Columns\" in Meds & Orders section of the refill encounter.                 Makenzie Freitas RN 05/23/23 7:50 PM  "

## 2023-06-04 DIAGNOSIS — Z79.4 TYPE 2 DIABETES MELLITUS WITH DIABETIC POLYNEUROPATHY, WITH LONG-TERM CURRENT USE OF INSULIN (H): ICD-10-CM

## 2023-06-04 DIAGNOSIS — E11.42 TYPE 2 DIABETES MELLITUS WITH DIABETIC POLYNEUROPATHY, WITH LONG-TERM CURRENT USE OF INSULIN (H): ICD-10-CM

## 2023-06-04 RX ORDER — INSULIN GLARGINE 100 [IU]/ML
20 INJECTION, SOLUTION SUBCUTANEOUS AT BEDTIME
Qty: 15 ML | Refills: 1 | Status: SHIPPED | OUTPATIENT
Start: 2023-06-04 | End: 2023-10-27

## 2023-06-04 NOTE — TELEPHONE ENCOUNTER
"Last Written Prescription Date:  1/12/23  Last Fill Quantity: 15 mL,  # refills: 1   Last office visit provider:  2/14/23     Requested Prescriptions   Pending Prescriptions Disp Refills     LANTUS SOLOSTAR 100 UNIT/ML soln [Pharmacy Med Name: LANTUS SOLOSTAR 100 UNIT/ML]  1     Sig: INJECT 20 UNITS SUBCUTANEOUS AT BEDTIME       Long Acting Insulin Protocol Passed - 6/4/2023  2:30 PM        Passed - Serum creatinine on file in past 12 months     Recent Labs   Lab Test 02/14/23  1350   CR 1.07*       Ok to refill medication if creatinine is low          Passed - HgbA1C in past 3 or 6 months     If HgbA1C is 8 or greater, it needs to be on file within the past 3 months.  If less than 8, must be on file within the past 6 months.     Recent Labs   Lab Test 02/14/23  1350   A1C 6.3*             Passed - Medication is active on med list        Passed - Patient is age 18 or older        Passed - Recent (6 mo) or future (30 days) visit within the authorizing provider's specialty     Patient had office visit in the last 6 months or has a visit in the next 30 days with authorizing provider or within the authorizing provider's specialty.  See \"Patient Info\" tab in inbasket, or \"Choose Columns\" in Meds & Orders section of the refill encounter.                 Elizabeth Page 06/04/23 2:57 PM  "

## 2023-06-17 DIAGNOSIS — F32.1 MODERATE MAJOR DEPRESSION (H): ICD-10-CM

## 2023-06-17 DIAGNOSIS — N39.46 MIXED INCONTINENCE: ICD-10-CM

## 2023-06-17 NOTE — TELEPHONE ENCOUNTER
"Routing refill request to provider for review/approval because:  PHQ-9 score failed    Last Written Prescription Date:  9/16/2022  Last Fill Quantity: 90,  # refills: 2   Last office visit provider:  2/14/2023     Requested Prescriptions   Pending Prescriptions Disp Refills     citalopram (CELEXA) 20 MG tablet [Pharmacy Med Name: CITALOPRAM HBR 20 MG TABLET] 90 tablet 2     Sig: TAKE 1 TABLET BY MOUTH EVERY DAY       SSRIs Protocol Failed - 6/17/2023  2:17 AM        Failed - PHQ-9 score less than 5 in past 6 months     Please review last PHQ-9 score.           Passed - Medication is active on med list        Passed - Patient is age 18 or older        Passed - No active pregnancy on record        Passed - No positive pregnancy test in last 12 months        Passed - Recent (6 mo) or future (30 days) visit within the authorizing provider's specialty     Patient had office visit in the last 6 months or has a visit in the next 30 days with authorizing provider or within the authorizing provider's specialty.  See \"Patient Info\" tab in inbasket, or \"Choose Columns\" in Meds & Orders section of the refill encounter.               oxybutynin ER (DITROPAN XL) 10 MG 24 hr tablet [Pharmacy Med Name: OXYBUTYNIN CL ER 10 MG TABLET] 90 tablet 3     Sig: TAKE 1 TABLET BY MOUTH EVERYDAY AT BEDTIME       Muscarinic Antagonists (Urinary Incontinence Agents) Passed - 6/17/2023  2:17 AM        Passed - Recent (12 mo) or future (30 days) visit within the authorizing provider's specialty     Patient has had an office visit with the authorizing provider or a provider within the authorizing providers department within the previous 12 mos or has a future within next 30 days. See \"Patient Info\" tab in inbasket, or \"Choose Columns\" in Meds & Orders section of the refill encounter.              Passed - Medication is Oxybutynin and patient is 5 years of age or older        Passed - Patient does not have a diagnosis of glaucoma on the problem " list     If glaucoma diagnosis is new, refer refill to physician.          Passed - Medication is active on med list        Passed - Patient is 18 years of age or older             Haven Duckworth RN 06/17/23 3:11 PM

## 2023-06-18 RX ORDER — CITALOPRAM HYDROBROMIDE 20 MG/1
TABLET ORAL
Qty: 90 TABLET | Refills: 2 | Status: SHIPPED | OUTPATIENT
Start: 2023-06-18 | End: 2024-03-13

## 2023-06-18 RX ORDER — OXYBUTYNIN CHLORIDE 10 MG/1
TABLET, EXTENDED RELEASE ORAL
Qty: 90 TABLET | Refills: 3 | Status: SHIPPED | OUTPATIENT
Start: 2023-06-18 | End: 2024-03-04

## 2023-07-27 DIAGNOSIS — I10 ESSENTIAL HYPERTENSION: ICD-10-CM

## 2023-07-27 RX ORDER — LOSARTAN POTASSIUM 50 MG/1
TABLET ORAL
Qty: 90 TABLET | Refills: 1 | Status: SHIPPED | OUTPATIENT
Start: 2023-07-27 | End: 2024-01-24

## 2023-08-09 DIAGNOSIS — E83.42 HYPOMAGNESEMIA: ICD-10-CM

## 2023-08-09 RX ORDER — LANOLIN ALCOHOL/MO/W.PET/CERES
CREAM (GRAM) TOPICAL
Qty: 180 TABLET | Refills: 3 | Status: SHIPPED | OUTPATIENT
Start: 2023-08-09 | End: 2024-09-05

## 2023-08-22 ENCOUNTER — TELEPHONE (OUTPATIENT)
Dept: INTERNAL MEDICINE | Facility: CLINIC | Age: 78
End: 2023-08-22
Payer: COMMERCIAL

## 2023-08-22 DIAGNOSIS — I10 PRIMARY HYPERTENSION: ICD-10-CM

## 2023-08-22 RX ORDER — AMLODIPINE BESYLATE 2.5 MG/1
TABLET ORAL
Qty: 90 TABLET | Refills: 3 | Status: SHIPPED | OUTPATIENT
Start: 2023-08-22 | End: 2024-09-09

## 2023-08-23 NOTE — TELEPHONE ENCOUNTER
Please call patient -    ______________________________________________________________________     Home phone:  [phone] (home)     Cell phone:   No relevant phone numbers on file.       Other contacts:  Name Home Phone Work Phone Mobile Phone Relationship Lgl Grcarine   STACY ZAPATA [phone] JAY Sauceda   [phone] Son      ______________________________________________________________________     Please see if you can help the patient to schedule an appointment with me in October or November to follow up on her health.    We just did a refill of her amlodipine (Norvasc).    Jonathan Perez MD  [facility]  8/22/2023, 8:59 PM

## 2023-08-25 DIAGNOSIS — Z79.4 TYPE 2 DIABETES MELLITUS WITH DIABETIC POLYNEUROPATHY, WITH LONG-TERM CURRENT USE OF INSULIN (H): ICD-10-CM

## 2023-08-25 DIAGNOSIS — E11.42 TYPE 2 DIABETES MELLITUS WITH DIABETIC POLYNEUROPATHY, WITH LONG-TERM CURRENT USE OF INSULIN (H): ICD-10-CM

## 2023-08-25 NOTE — TELEPHONE ENCOUNTER
"Routing refill request to provider for review/approval because:  Labs not current:  A1C  Patient needs to be seen because:  over 6 months since last visit.    Last Written Prescription Date:  5/23/2023  Last Fill Quantity: 360,  # refills: 0   Last office visit provider:  2/14/2023     Requested Prescriptions   Pending Prescriptions Disp Refills    metFORMIN (GLUCOPHAGE XR) 500 MG 24 hr tablet [Pharmacy Med Name: METFORMIN HCL  MG TABLET] 360 tablet 0     Sig: TAKE 4 TABLETS (2,000 MG) BY MOUTH DAILY OVERDUE FOR VISIT. MAKE APPOINTMENT IN DECEMBER.       Biguanide Agents Failed - 8/25/2023  3:47 PM        Failed - Patient has documented A1c within the specified period of time.     If HgbA1C is 8 or greater, it needs to be on file within the past 3 months.  If less than 8, must be on file within the past 6 months.     Recent Labs   Lab Test 02/14/23  1350   A1C 6.3*             Failed - Recent (6 mo) or future (30 days) visit within the authorizing provider's specialty     Patient had office visit in the last 6 months or has a visit in the next 30 days with authorizing provider or within the authorizing provider's specialty.  See \"Patient Info\" tab in inbasket, or \"Choose Columns\" in Meds & Orders section of the refill encounter.            Passed - Patient is age 10 or older        Passed - Patient's CR is NOT>1.4 OR Patient's EGFR is NOT<45 within past 12 mos.     Recent Labs   Lab Test 02/14/23  1350 11/30/21  1424 02/23/21  1059   GFRESTIMATED 53*   < > 52*   GFRESTBLACK  --   --  >60    < > = values in this interval not displayed.       Recent Labs   Lab Test 02/14/23  1350   CR 1.07*             Passed - Patient does NOT have a diagnosis of CHF.        Passed - Medication is active on med list        Passed - Patient is not pregnant        Passed - Patient has not had a positive pregnancy test within the past 12 mos.              Amanda Echols RN 08/25/23 3:47 PM  "

## 2023-08-27 RX ORDER — METFORMIN HCL 500 MG
2000 TABLET, EXTENDED RELEASE 24 HR ORAL DAILY
Qty: 360 TABLET | Refills: 1 | Status: SHIPPED | OUTPATIENT
Start: 2023-08-27 | End: 2024-06-13

## 2023-08-29 DIAGNOSIS — I10 ESSENTIAL HYPERTENSION: ICD-10-CM

## 2023-08-29 RX ORDER — TORSEMIDE 20 MG/1
40 TABLET ORAL DAILY
Qty: 180 TABLET | Refills: 0 | Status: SHIPPED | OUTPATIENT
Start: 2023-08-29 | End: 2023-11-30

## 2023-08-29 NOTE — TELEPHONE ENCOUNTER
"Routing refill request to provider for review/approval because:  Labs out of range:  creatinine, sodium, potassium    Last Written Prescription Date:  3/1/23  Last Fill Quantity: 180,  # refills: 1   Last office visit provider:  2/14/23     Requested Prescriptions   Pending Prescriptions Disp Refills    torsemide (DEMADEX) 20 MG tablet [Pharmacy Med Name: TORSEMIDE 20 MG TABLET] 180 tablet 1     Sig: TAKE 2 TABLETS BY MOUTH EVERY DAY       Diuretics (Including Combos) Protocol Failed - 8/29/2023 12:52 AM        Failed - Normal serum creatinine on file in past 12 months     Recent Labs   Lab Test 02/14/23  1350   CR 1.07*              Failed - Normal serum potassium on file in past 12 months     Recent Labs   Lab Test 02/14/23  1350   POTASSIUM 5.5*                    Failed - Normal serum sodium on file in past 12 months     Recent Labs   Lab Test 02/14/23  1350   *              Passed - Blood pressure under 140/90 in past 12 months     BP Readings from Last 3 Encounters:   02/14/23 136/64   06/17/22 (!) 168/82   06/09/22 (!) 188/80                 Passed - Recent (12 mo) or future (30 days) visit within the authorizing provider's specialty     Patient has had an office visit with the authorizing provider or a provider within the authorizing providers department within the previous 12 mos or has a future within next 30 days. See \"Patient Info\" tab in inbasket, or \"Choose Columns\" in Meds & Orders section of the refill encounter.              Passed - Medication is active on med list        Passed - Patient is age 18 or older        Passed - No active pregancy on record        Passed - No positive pregnancy test in past 12 months             Edwige Funk RN 08/29/23 3:12 PM  "

## 2023-10-02 ENCOUNTER — OFFICE VISIT (OUTPATIENT)
Dept: INTERNAL MEDICINE | Facility: CLINIC | Age: 78
End: 2023-10-02
Payer: COMMERCIAL

## 2023-10-02 VITALS
BODY MASS INDEX: 29.57 KG/M2 | SYSTOLIC BLOOD PRESSURE: 134 MMHG | HEIGHT: 66 IN | TEMPERATURE: 98.6 F | WEIGHT: 184 LBS | RESPIRATION RATE: 22 BRPM | HEART RATE: 72 BPM | OXYGEN SATURATION: 98 % | DIASTOLIC BLOOD PRESSURE: 68 MMHG

## 2023-10-02 DIAGNOSIS — M25.50 MULTIPLE JOINT PAIN: ICD-10-CM

## 2023-10-02 DIAGNOSIS — I10 PRIMARY HYPERTENSION: ICD-10-CM

## 2023-10-02 DIAGNOSIS — M48.062 SPINAL STENOSIS OF LUMBAR REGION WITH NEUROGENIC CLAUDICATION: ICD-10-CM

## 2023-10-02 DIAGNOSIS — F33.41 RECURRENT MAJOR DEPRESSIVE DISORDER, IN PARTIAL REMISSION (H): Chronic | ICD-10-CM

## 2023-10-02 DIAGNOSIS — Z79.4 TYPE 2 DIABETES MELLITUS WITH DIABETIC POLYNEUROPATHY, WITH LONG-TERM CURRENT USE OF INSULIN (H): Primary | ICD-10-CM

## 2023-10-02 DIAGNOSIS — E11.42 TYPE 2 DIABETES MELLITUS WITH DIABETIC POLYNEUROPATHY, WITH LONG-TERM CURRENT USE OF INSULIN (H): Primary | ICD-10-CM

## 2023-10-02 LAB
ANION GAP SERPL CALCULATED.3IONS-SCNC: 14 MMOL/L (ref 7–15)
BUN SERPL-MCNC: 21 MG/DL (ref 8–23)
CALCIUM SERPL-MCNC: 9.6 MG/DL (ref 8.8–10.2)
CHLORIDE SERPL-SCNC: 92 MMOL/L (ref 98–107)
CREAT SERPL-MCNC: 1.27 MG/DL (ref 0.51–0.95)
CRP SERPL-MCNC: <3 MG/L
DEPRECATED HCO3 PLAS-SCNC: 23 MMOL/L (ref 22–29)
EGFRCR SERPLBLD CKD-EPI 2021: 43 ML/MIN/1.73M2
ERYTHROCYTE [DISTWIDTH] IN BLOOD BY AUTOMATED COUNT: 13.6 % (ref 10–15)
ERYTHROCYTE [SEDIMENTATION RATE] IN BLOOD BY WESTERGREN METHOD: 47 MM/HR (ref 0–30)
GLUCOSE SERPL-MCNC: 90 MG/DL (ref 70–99)
HBA1C MFR BLD: 6.5 % (ref 0–5.6)
HCT VFR BLD AUTO: 33.3 % (ref 35–47)
HGB BLD-MCNC: 11.8 G/DL (ref 11.7–15.7)
MCH RBC QN AUTO: 32.2 PG (ref 26.5–33)
MCHC RBC AUTO-ENTMCNC: 35.4 G/DL (ref 31.5–36.5)
MCV RBC AUTO: 91 FL (ref 78–100)
PLATELET # BLD AUTO: 278 10E3/UL (ref 150–450)
POTASSIUM SERPL-SCNC: 5.1 MMOL/L (ref 3.4–5.3)
RBC # BLD AUTO: 3.66 10E6/UL (ref 3.8–5.2)
SODIUM SERPL-SCNC: 129 MMOL/L (ref 135–145)
TSH SERPL DL<=0.005 MIU/L-ACNC: 1.68 UIU/ML (ref 0.3–4.2)
WBC # BLD AUTO: 6.5 10E3/UL (ref 4–11)

## 2023-10-02 PROCEDURE — 80048 BASIC METABOLIC PNL TOTAL CA: CPT | Performed by: INTERNAL MEDICINE

## 2023-10-02 PROCEDURE — 86140 C-REACTIVE PROTEIN: CPT | Performed by: INTERNAL MEDICINE

## 2023-10-02 PROCEDURE — 84443 ASSAY THYROID STIM HORMONE: CPT | Performed by: INTERNAL MEDICINE

## 2023-10-02 PROCEDURE — 99214 OFFICE O/P EST MOD 30 MIN: CPT | Performed by: INTERNAL MEDICINE

## 2023-10-02 PROCEDURE — 83036 HEMOGLOBIN GLYCOSYLATED A1C: CPT | Performed by: INTERNAL MEDICINE

## 2023-10-02 PROCEDURE — 85652 RBC SED RATE AUTOMATED: CPT | Performed by: INTERNAL MEDICINE

## 2023-10-02 PROCEDURE — 36415 COLL VENOUS BLD VENIPUNCTURE: CPT | Performed by: INTERNAL MEDICINE

## 2023-10-02 PROCEDURE — 85027 COMPLETE CBC AUTOMATED: CPT | Performed by: INTERNAL MEDICINE

## 2023-10-02 ASSESSMENT — PATIENT HEALTH QUESTIONNAIRE - PHQ9
10. IF YOU CHECKED OFF ANY PROBLEMS, HOW DIFFICULT HAVE THESE PROBLEMS MADE IT FOR YOU TO DO YOUR WORK, TAKE CARE OF THINGS AT HOME, OR GET ALONG WITH OTHER PEOPLE: NOT DIFFICULT AT ALL
SUM OF ALL RESPONSES TO PHQ QUESTIONS 1-9: 0
SUM OF ALL RESPONSES TO PHQ QUESTIONS 1-9: 0

## 2023-10-02 NOTE — LETTER
10/3/2023    Rhea Jasmine   [address] [place] [address]         Dear Rhea,    It was good to see you in clinic.  I hope your questions were answered at the time of your visit.    The results of your tests from your visit were as follows:    Resulted Orders   Hemoglobin A1c   Result Value Ref Range    Hemoglobin A1C 6.5 (H) 0.0 - 5.6 %   Basic metabolic panel   Result Value Ref Range    Sodium 129 (L) 135 - 145 mmol/L    Potassium 5.1 3.4 - 5.3 mmol/L    Chloride 92 (L) 98 - 107 mmol/L    Carbon Dioxide (CO2) 23 22 - 29 mmol/L    Anion Gap 14 7 - 15 mmol/L    Urea Nitrogen 21.0 8.0 - 23.0 mg/dL    Creatinine 1.27 (H) 0.51 - 0.95 mg/dL    GFR Estimate 43 (L) >60 mL/min/1.73m2    Calcium 9.6 8.8 - 10.2 mg/dL    Glucose 90 70 - 99 mg/dL   CBC with platelets   Result Value Ref Range    WBC Count 6.5 4.0 - 11.0 10e3/uL    RBC Count 3.66 (L) 3.80 - 5.20 10e6/uL    Hemoglobin 11.8 11.7 - 15.7 g/dL    Hematocrit 33.3 (L) 35.0 - 47.0 %    MCV 91 78 - 100 fL    MCH 32.2 26.5 - 33.0 pg    MCHC 35.4 31.5 - 36.5 g/dL    RDW 13.6 10.0 - 15.0 %    Platelet Count 278 150 - 450 10e3/uL   Erythrocyte sedimentation rate auto   Result Value Ref Range    Erythrocyte Sedimentation Rate 47 (H) 0 - 30 mm/hr   CRP inflammation   Result Value Ref Range    CRP Inflammation <3.00 <5.00 mg/L   TSH   Result Value Ref Range    TSH 1.68 0.30 - 4.20 uIU/mL     Your thyroid is normal.    One marker of inflammation was mildly elevated, but the other one was normal.  This is not concerning at this time.    Your blood counts are normal and you are not anemic.     Your diabetes is doing very well.    Your sodium remains slightly low.  Your electrolytes are otherwise good.  Your kidney function remains slightly high.    Please follow up again in 6 months for an annual wellness visit.    If you have any questions regarding these results, please feel free to contact me at [phone].  I wish you the best of health!      Sincerely,        Jonathan Perez MD  General Internal Medicine  [facility]

## 2023-10-02 NOTE — PROGRESS NOTES
{PROVIDER CHARTING PREFERENCE:646144}    Mark Wilkerson is a 78 year old, presenting for the following health issues:  Follow Up        10/2/2023     9:41 AM   Additional Questions   Roomed by Leila       History of Present Illness       Diabetes:   She presents for follow up of diabetes.  She is checking home blood glucose one time daily.   She checks blood glucose before meals.  Blood glucose is never over 200 and sometimes under 70. She is aware of hypoglycemia symptoms including none.    She has no concerns regarding her diabetes at this time.   She is not experiencing numbness or burning in feet, excessive thirst, blurry vision, weight changes or redness, sores or blisters on feet. The patient has not had a diabetic eye exam in the last 12 months.          She eats 0-1 servings of fruits and vegetables daily.She consumes 0 sweetened beverage(s) daily.She exercises with enough effort to increase her heart rate 9 or less minutes per day.  She exercises with enough effort to increase her heart rate 3 or less days per week.   She is taking medications regularly.       {MA/LPN/RN Pre-Provider Visit Orders- hCG/UA/Strep (Optional):517462}  Diabetes Follow-up    How often are you checking your blood sugar? One time daily  What time of day are you checking your blood sugars (select all that apply)?  Before meals  Have you had any blood sugars above 200?  No  Have you had any blood sugars below 70?  Yes   What symptoms do you notice when your blood sugar is low?  None  What concerns do you have today about your diabetes? None   Do you have any of these symptoms? (Select all that apply)  No numbness or tingling in feet.  No redness, sores or blisters on feet.  No complaints of excessive thirst.  No reports of blurry vision.  No significant changes to weight.  Have you had a diabetic eye exam in the last 12 months? No        BP Readings from Last 2 Encounters:   10/02/23 134/68   02/14/23 136/64     Hemoglobin  "A1C (%)   Date Value   02/14/2023 6.3 (H)   06/17/2022 6.0 (H)     LDL Cholesterol Calculated (mg/dL)   Date Value   02/14/2023 84   08/21/2020 91     LDL Cholesterol Direct (mg/dL)   Date Value   11/19/2013 94       {Reference  Diabetes Management Resources  Blood Glucose Log - 3 weeks  Blood Glucose Log with Food and Insulin Record :769004}  {additonal problems for provider to add (Optional):376687}      Review of Systems   {ROS COMP (Optional):923320}      Objective    /68 (BP Location: Right arm, Patient Position: Sitting, Cuff Size: Adult Regular)   Pulse 72   Temp 98.6  F (37  C) (Oral)   Resp 22   Ht 1.676 m (5' 6\")   Wt 83.5 kg (184 lb)   SpO2 98%   BMI 29.70 kg/m    Body mass index is 29.7 kg/m .  Physical Exam   {Exam List (Optional):379548}    {Diagnostic Test Results (Optional):557449}    {AMBULATORY ATTESTATION (Optional):963361}              "

## 2023-10-03 NOTE — PROGRESS NOTES
[facility]    Comprehensive and complex medical care - Chronic disease management - Shared decision making - Care coordination - Compassionate care    Patient advocacy - Rational deprescribing - Minimally disruptive medicine - Ethical focus - Customized care         Date of Service: 10/2/2023  Primary Provider: Jonathan Perez    Patient Care Team:  Jonathan Perez MD as PCP - General  Jonathan Perez MD as Assigned PCP  Speedy Torres MD as Jim Mims Dpm, DPM (Inactive) as MD          Patient's Pharmacy:    [facility] [id number] [facility] - [place], [place] - [address]  [address]  [place] [address]  Phone: [phone] Fax: [phone]     Patient's Contacts:  Name Home Phone Work Phone Mobile Phone Relationship Lgl STACY Sousa [phone] JAY Sauceda   [phone] Son      Patient's Insurance:    Payor: [employer] / Plan: [employer] MEDICARE / Product Type: HMO /           Active Problem List:  Problem List as of 10/2/2023 Reviewed: 10/2/2023  9:23 PM by Jonathan Perez MD         High    Tobacco abuse    Type 2 diabetes mellitus with diabetic polyneuropathy, with long-term current use of insulin (H)    DNR (do not resuscitate)       Medium    Primary hypertension    SHIV (obstructive sleep apnea)    Moderate major depression (H)    Stage 3a chronic kidney disease (H)       Low    Mixed hyperlipidemia    OA (osteoarthritis)    Low back pain radiating to left leg    Tubular adenoma of colon - 2017 - single 9 mm    Mixed incontinence    Microalbuminuria due to type 2 diabetes mellitus (H)    Diabetic peripheral neuropathy (H)    On angiotensin receptor blockers (ARB)    Lumbar spinal stenosis        Current Outpatient Medications   Medication Instructions    acetaminophen (TYLENOL) 1,000 mg, PRN    amLODIPine (NORVASC) 2.5 MG tablet TAKE 1 TABLET BY MOUTH EVERY DAY    aspirin (ASA) 81 mg, DAILY     atorvastatin (LIPITOR) 40 MG tablet TAKE 1 TABLET BY MOUTH EVERYDAY AT BEDTIME    B-D U/F 31G X 8 MM insulin pen needle USE DAILY AS DIRECTED    citalopram (CELEXA) 20 MG tablet TAKE 1 TABLET BY MOUTH EVERY DAY    fluticasone (FLONASE) 50 mcg/actuation nasal spray 2 sprays, DAILY    gabapentin (NEURONTIN) 300 MG capsule TAKE 1 CAPSULE BY MOUTH THREE TIMES A DAY    insulin needles, disposable, 31 Ndle [INSULIN NEEDLES, DISPOSABLE, 31 NDLE] Use As Directed. Use 1x/day    lancets (TRUEPLUS LANCETS) 28 gauge Misc 3 TIMES DAILY    Lantus SoloStar 20 Units, Subcutaneous, AT BEDTIME    losartan (COZAAR) 50 MG tablet TAKE 1 TABLET BY MOUTH EVERY DAY    magnesium oxide (MAG-OX) 400 mg (241.3 mg magnesium) tablet 1 tablet, Oral, 2 TIMES DAILY    MAGNESIUM OXIDE 400 (240 Mg) MG tablet TAKE 1 TABLET BY MOUTH TWICE A DAY    metFORMIN (GLUCOPHAGE XR) 2,000 mg, Oral, DAILY    metoprolol succinate ER (TOPROL XL) 50 MG 24 hr tablet TAKE 1 TABLET BY MOUTH EVERY DAY    ONETOUCH VERIO IQ test strip USE 1 EACH AS DIRECTED 2 (TWO) TIMES A DAY.    oxybutynin ER (DITROPAN XL) 10 MG 24 hr tablet TAKE 1 TABLET BY MOUTH EVERYDAY AT BEDTIME    torsemide (DEMADEX) 40 mg, Oral, DAILY    Vitamin D3 (CHOLECALCIFEROL) 1,000 Units, DAILY    vitamin E (TOCOPHEROL) 400 Units, DAILY     Social History     Social History Narrative    Retired nursing assistant.    Previous patient of Dr. Ghotra.    Goes to [facility] for exercise.         Subjective:     Rhea Jasmine is a 78 year old female who comes in today for:    Chief Complaint   Patient presents with    Follow Up          10/2/2023     9:41 AM   Additional Questions   Roomed by Leila Woodward for follow up multiple issues.    First reviewed her hypertension and this is doing well.    Diabetes reviewed and she has not noted any problems as of late.    Mental health is stable and mood is still flat at times.    Reviewed her bone density scan (DEXA) from earlier this year.  No  "desire to start medications based on our findings.  She does not want medications.    Lots of joint pains in her knees, hips and low back.  Other areas as well.  Has known lumbar spinal stenosis as well.  Discussed further work-up, management or referral and she declines these at this time.    We reviewed her other issues noted in the assessment but not specifically addressed in the HPI above.     Objective:     Wt Readings from Last 3 Encounters:   10/02/23 83.5 kg (184 lb)   02/14/23 81.8 kg (180 lb 6.4 oz)   06/17/22 80.3 kg (177 lb)     BP Readings from Last 3 Encounters:   10/02/23 134/68   02/14/23 136/64   06/17/22 (!) 168/82     /68 (BP Location: Right arm, Patient Position: Sitting, Cuff Size: Adult Regular)   Pulse 72   Temp 98.6  F (37  C) (Oral)   Resp 22   Ht 1.676 m (5' 6\")   Wt 83.5 kg (184 lb)   SpO2 98%   BMI 29.70 kg/m     The patient is comfortable, no acute distress.  Mood good.  Insight good.  Eyes are nonicteric.  Neck is supple without mass.  No cervical adenopathy.  No thyromegaly. Heart regular rate and rhythm.  Lungs clear to auscultation bilaterally.  Respiratory effort is good.  Abdomen soft and nontender.  Extremities no edema.      Diagnostics:     Office Visit on 02/14/2023   Component Date Value Ref Range Status    Hemoglobin A1C 02/14/2023 6.3 (H)  0.0 - 5.6 % Final    Normal <5.7%   Prediabetes 5.7-6.4%    Diabetes 6.5% or higher     Note: Adopted from ADA consensus guidelines.    Hemoglobin 02/14/2023 11.4 (L)  11.7 - 15.7 g/dL Final    Creatinine Urine mg/dL 02/14/2023 44.4  mg/dL Final    The reference ranges have not been established in urine creatinine. The results should be integrated into the clinical context for interpretation.    Albumin Urine mg/L 02/14/2023 65.2  mg/L Final    The reference ranges have not been established in urine albumin. The results should be integrated into the clinical context for interpretation.    Albumin Urine mg/g Cr 02/14/2023 " 146.85 (H)  0.00 - 25.00 mg/g Cr Final    Microalbuminuria is defined as an albumin:creatinine ratio of 17 to 299 for males and 25 to 299 for females. A ratio of albumin:creatinine of 300 or higher is indicative of overt proteinuria.  Due to biologic variability, positive results should be confirmed by a second, first-morning random or 24-hour timed urine specimen. If there is discrepancy, a third specimen is recommended. When 2 out of 3 results are in the microalbuminuria range, this is evidence for incipient nephropathy and warrants increased efforts at glucose control, blood pressure control, and institution of therapy with an angiotensin-converting-enzyme (ACE) inhibitor (if the patient can tolerate it).      Cholesterol 02/14/2023 161  <200 mg/dL Final    Triglycerides 02/14/2023 112  <150 mg/dL Final    Direct Measure HDL 02/14/2023 55  >=50 mg/dL Final    LDL Cholesterol Calculated 02/14/2023 84  <=100 mg/dL Final    Non HDL Cholesterol 02/14/2023 106  <130 mg/dL Final    Sodium 02/14/2023 129 (L)  136 - 145 mmol/L Final    Potassium 02/14/2023 5.5 (H)  3.4 - 5.3 mmol/L Final    Chloride 02/14/2023 93 (L)  98 - 107 mmol/L Final    Carbon Dioxide (CO2) 02/14/2023 23  22 - 29 mmol/L Final    Anion Gap 02/14/2023 13  7 - 15 mmol/L Final    Urea Nitrogen 02/14/2023 25.4 (H)  8.0 - 23.0 mg/dL Final    Creatinine 02/14/2023 1.07 (H)  0.51 - 0.95 mg/dL Final    Calcium 02/14/2023 9.9  8.8 - 10.2 mg/dL Final    Glucose 02/14/2023 79  70 - 99 mg/dL Final    Alkaline Phosphatase 02/14/2023 87  35 - 104 U/L Final    AST 02/14/2023 22  10 - 35 U/L Final    ALT 02/14/2023 14  10 - 35 U/L Final    Protein Total 02/14/2023 7.2  6.4 - 8.3 g/dL Final    Albumin 02/14/2023 4.4  3.5 - 5.2 g/dL Final    Bilirubin Total 02/14/2023 0.3  <=1.2 mg/dL Final    GFR Estimate 02/14/2023 53 (L)  >60 mL/min/1.73m2 Final    eGFR calculated using 2021 CKD-EPI equation.       Results for orders placed or performed in visit on 10/02/23    Hemoglobin A1c     Status: Abnormal   Result Value Ref Range    Hemoglobin A1C 6.5 (H) 0.0 - 5.6 %   Basic metabolic panel     Status: Abnormal   Result Value Ref Range    Sodium 129 (L) 135 - 145 mmol/L    Potassium 5.1 3.4 - 5.3 mmol/L    Chloride 92 (L) 98 - 107 mmol/L    Carbon Dioxide (CO2) 23 22 - 29 mmol/L    Anion Gap 14 7 - 15 mmol/L    Urea Nitrogen 21.0 8.0 - 23.0 mg/dL    Creatinine 1.27 (H) 0.51 - 0.95 mg/dL    GFR Estimate 43 (L) >60 mL/min/1.73m2    Calcium 9.6 8.8 - 10.2 mg/dL    Glucose 90 70 - 99 mg/dL   CBC with platelets     Status: Abnormal   Result Value Ref Range    WBC Count 6.5 4.0 - 11.0 10e3/uL    RBC Count 3.66 (L) 3.80 - 5.20 10e6/uL    Hemoglobin 11.8 11.7 - 15.7 g/dL    Hematocrit 33.3 (L) 35.0 - 47.0 %    MCV 91 78 - 100 fL    MCH 32.2 26.5 - 33.0 pg    MCHC 35.4 31.5 - 36.5 g/dL    RDW 13.6 10.0 - 15.0 %    Platelet Count 278 150 - 450 10e3/uL   Erythrocyte sedimentation rate auto     Status: Abnormal   Result Value Ref Range    Erythrocyte Sedimentation Rate 47 (H) 0 - 30 mm/hr   CRP inflammation     Status: Normal   Result Value Ref Range    CRP Inflammation <3.00 <5.00 mg/L   TSH     Status: Normal   Result Value Ref Range    TSH 1.68 0.30 - 4.20 uIU/mL       Assessment:     1. Type 2 diabetes mellitus with diabetic polyneuropathy, with long-term current use of insulin (H)    2. Primary hypertension    3. Multiple joint pain    4. Spinal stenosis of lumbar region with neurogenic claudication    5. Recurrent major depressive disorder, in partial remission (H24)        Plan:     Check blood work today.     Ongoing joint issues likely degenerative in nature.  Continue current medications otherwise.  Follow up sooner if issues.    Orders Placed This Encounter   Procedures    Hemoglobin A1c    Basic metabolic panel    CBC with platelets    Erythrocyte sedimentation rate auto    CRP inflammation    TSH           Jonathan Perez MD  General Internal Medicine  [facility]  [facility]    Return in about 6 months (around 4/2/2024) for annual wellness visit.     No future appointments.

## 2023-10-03 NOTE — PATIENT INSTRUCTIONS
Please follow up if you have any further issues.    You may contact me by phone or MyChart if you are worsening or if things are not improving.    ______________________________________________________________________     You can call [phone] during weekday hours to get a hold of our team coordinators if you need to get a message to me.    There are 3 people in our building taking phone calls for me.  Be sure to listen to the prompts to get a hold of them.    You first press 1 for English (press another number for a different language) and then press 2 to get the .    They can then take your message and forward it onto me.    ______________________________________________________________________     Please remember that you can call [phone] ANYTIME to schedule an appointment.     You can schedule appointments 24 hours a day, 7 days a week.      Sometimes the best time to schedule an appointment is after clinic hours when less people are calling in.      Weekends are another option for calling in to schedule appointments.     ______________________________________________________________________     Preventative Measures:  Health Maintenance   Topic Date Due    ANNUAL REVIEW OF  ORDERS  Never done    LUNG CANCER SCREENING  Never done    EYE EXAM  10/27/2021    COVID-19 Vaccine (6 - 2023-24 season) 09/01/2023    NICOTINE/TOBACCO CESSATION COUNSELING Q 1 YR  02/14/2024    MEDICARE ANNUAL WELLNESS VISIT  02/14/2024    MICROALBUMIN  02/14/2024    DIABETIC FOOT EXAM  02/14/2024    FALL RISK ASSESSMENT  02/14/2024    A1C  04/02/2024    PHQ-9  04/02/2024    BMP  10/02/2024    HEMOGLOBIN  10/02/2024    LIPID  02/14/2026    ADVANCE CARE PLANNING  02/15/2028    DTAP/TDAP/TD IMMUNIZATION (3 - Td or Tdap) 05/14/2029    DEXA  11/21/2037    INFLUENZA VACCINE  Completed    URINALYSIS  Completed    ZOSTER IMMUNIZATION  Completed    DEPRESSION ACTION PLAN  Addressed    IPV IMMUNIZATION  Aged Out    HPV  IMMUNIZATION  Aged Out    MENINGITIS IMMUNIZATION  Aged Out    HEPATITIS C SCREENING  Discontinued    Pneumococcal Vaccine: 65+ Years  Discontinued    COLORECTAL CANCER SCREENING  Discontinued

## 2023-10-27 DIAGNOSIS — E11.42 TYPE 2 DIABETES MELLITUS WITH DIABETIC POLYNEUROPATHY, WITH LONG-TERM CURRENT USE OF INSULIN (H): ICD-10-CM

## 2023-10-27 DIAGNOSIS — Z79.4 TYPE 2 DIABETES MELLITUS WITH DIABETIC POLYNEUROPATHY, WITH LONG-TERM CURRENT USE OF INSULIN (H): ICD-10-CM

## 2023-10-27 RX ORDER — INSULIN GLARGINE 100 [IU]/ML
20 INJECTION, SOLUTION SUBCUTANEOUS AT BEDTIME
Qty: 15 ML | Refills: 1 | Status: SHIPPED | OUTPATIENT
Start: 2023-10-27 | End: 2024-04-08

## 2023-11-30 DIAGNOSIS — I10 ESSENTIAL HYPERTENSION: ICD-10-CM

## 2023-11-30 RX ORDER — TORSEMIDE 20 MG/1
40 TABLET ORAL DAILY
Qty: 180 TABLET | Refills: 1 | Status: SHIPPED | OUTPATIENT
Start: 2023-11-30 | End: 2024-06-13

## 2023-12-19 ENCOUNTER — TRANSFERRED RECORDS (OUTPATIENT)
Dept: MULTI SPECIALTY CLINIC | Facility: CLINIC | Age: 78
End: 2023-12-19
Payer: COMMERCIAL

## 2023-12-19 LAB — RETINOPATHY: NORMAL

## 2024-01-11 ENCOUNTER — TELEPHONE (OUTPATIENT)
Dept: PHARMACY | Facility: CLINIC | Age: 79
End: 2024-01-11
Payer: COMMERCIAL

## 2024-01-11 NOTE — TELEPHONE ENCOUNTER
MTM referral from: Patient's insurance     MTM referral outreach attempt #1 on 01/11/24       Outcome: patient already met with [facility] for the year- declined another visit.    Rc AuD, [facility] Center[facility]  Medication Therapy Management Provider  Phone: [phone]  [email or web address]

## 2024-01-18 ENCOUNTER — PATIENT OUTREACH (OUTPATIENT)
Dept: CARE COORDINATION | Facility: CLINIC | Age: 79
End: 2024-01-18
Payer: COMMERCIAL

## 2024-01-24 DIAGNOSIS — I10 ESSENTIAL HYPERTENSION: ICD-10-CM

## 2024-01-24 DIAGNOSIS — E78.2 MIXED HYPERLIPIDEMIA: ICD-10-CM

## 2024-01-24 RX ORDER — ATORVASTATIN CALCIUM 40 MG/1
TABLET, FILM COATED ORAL
Qty: 90 TABLET | Refills: 0 | Status: SHIPPED | OUTPATIENT
Start: 2024-01-24 | End: 2024-04-19

## 2024-01-24 RX ORDER — LOSARTAN POTASSIUM 50 MG/1
TABLET ORAL
Qty: 90 TABLET | Refills: 1 | Status: SHIPPED | OUTPATIENT
Start: 2024-01-24 | End: 2024-07-30

## 2024-01-29 DIAGNOSIS — Z79.4 TYPE 2 DIABETES MELLITUS WITH DIABETIC POLYNEUROPATHY, WITH LONG-TERM CURRENT USE OF INSULIN (H): ICD-10-CM

## 2024-01-29 DIAGNOSIS — E11.42 TYPE 2 DIABETES MELLITUS WITH DIABETIC POLYNEUROPATHY, WITH LONG-TERM CURRENT USE OF INSULIN (H): ICD-10-CM

## 2024-01-29 RX ORDER — BLOOD SUGAR DIAGNOSTIC
STRIP MISCELLANEOUS
Qty: 200 STRIP | Refills: 3 | Status: SHIPPED | OUTPATIENT
Start: 2024-01-29

## 2024-02-01 ENCOUNTER — PATIENT OUTREACH (OUTPATIENT)
Dept: CARE COORDINATION | Facility: CLINIC | Age: 79
End: 2024-02-01
Payer: COMMERCIAL

## 2024-02-23 DIAGNOSIS — E11.9 DM TYPE 2 (DIABETES MELLITUS, TYPE 2) (H): ICD-10-CM

## 2024-02-23 RX ORDER — PEN NEEDLE, DIABETIC 31 GX5/16"
NEEDLE, DISPOSABLE MISCELLANEOUS
Qty: 100 EACH | Refills: 0 | Status: SHIPPED | OUTPATIENT
Start: 2024-02-23 | End: 2024-06-07

## 2024-03-04 ENCOUNTER — TELEPHONE (OUTPATIENT)
Dept: INTERNAL MEDICINE | Facility: CLINIC | Age: 79
End: 2024-03-04

## 2024-03-04 ENCOUNTER — OFFICE VISIT (OUTPATIENT)
Dept: INTERNAL MEDICINE | Facility: CLINIC | Age: 79
End: 2024-03-04
Payer: COMMERCIAL

## 2024-03-04 VITALS
BODY MASS INDEX: 30.32 KG/M2 | OXYGEN SATURATION: 100 % | DIASTOLIC BLOOD PRESSURE: 72 MMHG | HEIGHT: 65 IN | RESPIRATION RATE: 18 BRPM | SYSTOLIC BLOOD PRESSURE: 132 MMHG | HEART RATE: 50 BPM | WEIGHT: 182 LBS

## 2024-03-04 DIAGNOSIS — N18.31 STAGE 3A CHRONIC KIDNEY DISEASE (H): ICD-10-CM

## 2024-03-04 DIAGNOSIS — E11.42 TYPE 2 DIABETES MELLITUS WITH DIABETIC POLYNEUROPATHY, WITH LONG-TERM CURRENT USE OF INSULIN (H): ICD-10-CM

## 2024-03-04 DIAGNOSIS — Z79.4 TYPE 2 DIABETES MELLITUS WITH DIABETIC POLYNEUROPATHY, WITH LONG-TERM CURRENT USE OF INSULIN (H): ICD-10-CM

## 2024-03-04 DIAGNOSIS — M15.0 PRIMARY OSTEOARTHRITIS INVOLVING MULTIPLE JOINTS: ICD-10-CM

## 2024-03-04 DIAGNOSIS — M48.062 SPINAL STENOSIS OF LUMBAR REGION WITH NEUROGENIC CLAUDICATION: ICD-10-CM

## 2024-03-04 DIAGNOSIS — N39.46 MIXED INCONTINENCE: ICD-10-CM

## 2024-03-04 DIAGNOSIS — Z00.00 MEDICARE ANNUAL WELLNESS VISIT, SUBSEQUENT: Primary | ICD-10-CM

## 2024-03-04 DIAGNOSIS — F33.41 RECURRENT MAJOR DEPRESSIVE DISORDER, IN PARTIAL REMISSION (H): ICD-10-CM

## 2024-03-04 LAB
ANION GAP SERPL CALCULATED.3IONS-SCNC: 13 MMOL/L (ref 7–15)
BUN SERPL-MCNC: 17.9 MG/DL (ref 8–23)
CALCIUM SERPL-MCNC: 9.5 MG/DL (ref 8.8–10.2)
CHLORIDE SERPL-SCNC: 97 MMOL/L (ref 98–107)
CREAT SERPL-MCNC: 1.31 MG/DL (ref 0.51–0.95)
DEPRECATED HCO3 PLAS-SCNC: 23 MMOL/L (ref 22–29)
EGFRCR SERPLBLD CKD-EPI 2021: 42 ML/MIN/1.73M2
GLUCOSE SERPL-MCNC: 97 MG/DL (ref 70–99)
HBA1C MFR BLD: 6.5 % (ref 0–5.6)
POTASSIUM SERPL-SCNC: 4.8 MMOL/L (ref 3.4–5.3)
SODIUM SERPL-SCNC: 133 MMOL/L (ref 135–145)

## 2024-03-04 PROCEDURE — 83036 HEMOGLOBIN GLYCOSYLATED A1C: CPT | Performed by: INTERNAL MEDICINE

## 2024-03-04 PROCEDURE — G0439 PPPS, SUBSEQ VISIT: HCPCS | Performed by: INTERNAL MEDICINE

## 2024-03-04 PROCEDURE — 80048 BASIC METABOLIC PNL TOTAL CA: CPT | Performed by: INTERNAL MEDICINE

## 2024-03-04 PROCEDURE — 36415 COLL VENOUS BLD VENIPUNCTURE: CPT | Performed by: INTERNAL MEDICINE

## 2024-03-04 PROCEDURE — 99214 OFFICE O/P EST MOD 30 MIN: CPT | Mod: 25 | Performed by: INTERNAL MEDICINE

## 2024-03-04 RX ORDER — OXYBUTYNIN CHLORIDE 15 MG/1
15 TABLET, EXTENDED RELEASE ORAL DAILY
Qty: 90 TABLET | Refills: 3 | Status: SHIPPED | OUTPATIENT
Start: 2024-03-04 | End: 2025-02-27

## 2024-03-04 RX ORDER — RESPIRATORY SYNCYTIAL VIRUS VACCINE 120MCG/0.5
0.5 KIT INTRAMUSCULAR ONCE
Qty: 1 EACH | Refills: 0 | Status: CANCELLED | OUTPATIENT
Start: 2024-03-04 | End: 2024-03-04

## 2024-03-04 SDOH — HEALTH STABILITY: PHYSICAL HEALTH: ON AVERAGE, HOW MANY DAYS PER WEEK DO YOU ENGAGE IN MODERATE TO STRENUOUS EXERCISE (LIKE A BRISK WALK)?: 0 DAYS

## 2024-03-04 SDOH — HEALTH STABILITY: PHYSICAL HEALTH: ON AVERAGE, HOW MANY MINUTES DO YOU ENGAGE IN EXERCISE AT THIS LEVEL?: 0 MIN

## 2024-03-04 ASSESSMENT — PATIENT HEALTH QUESTIONNAIRE - PHQ9
SUM OF ALL RESPONSES TO PHQ QUESTIONS 1-9: 17
SUM OF ALL RESPONSES TO PHQ QUESTIONS 1-9: 17
10. IF YOU CHECKED OFF ANY PROBLEMS, HOW DIFFICULT HAVE THESE PROBLEMS MADE IT FOR YOU TO DO YOUR WORK, TAKE CARE OF THINGS AT HOME, OR GET ALONG WITH OTHER PEOPLE: VERY DIFFICULT

## 2024-03-04 ASSESSMENT — SOCIAL DETERMINANTS OF HEALTH (SDOH): HOW OFTEN DO YOU GET TOGETHER WITH FRIENDS OR RELATIVES?: PATIENT DECLINED

## 2024-03-04 NOTE — LETTER
3/5/2024    Rhea Jasmine   [address] [place] [address]         Dear Rhea,    It was good to see you in clinic.  I hope your questions were answered at the time of your visit.    The results of your tests from your visit were as follows:    Resulted Orders   Basic metabolic panel   Result Value Ref Range    Sodium 133 (L) 135 - 145 mmol/L    Potassium 4.8 3.4 - 5.3 mmol/L    Chloride 97 (L) 98 - 107 mmol/L    Carbon Dioxide (CO2) 23 22 - 29 mmol/L    Anion Gap 13 7 - 15 mmol/L    Urea Nitrogen 17.9 8.0 - 23.0 mg/dL    Creatinine 1.31 (H) 0.51 - 0.95 mg/dL    GFR Estimate 42 (L) >60 mL/min/1.73m2    Calcium 9.5 8.8 - 10.2 mg/dL    Glucose 97 70 - 99 mg/dL   Hemoglobin A1c   Result Value Ref Range    Hemoglobin A1C 6.5 (H) 0.0 - 5.6 %      Comment:      Normal <5.7%   Prediabetes 5.7-6.4%    Diabetes 6.5% or higher     Note: Adopted from ADA consensus guidelines.     Your diabetes is doing very well.    Your electrolytes and your kidney tests are stable.    See you again on:  Future Appointments   Date Time Provider Department Center   9/5/2024 11:30 AM [facility]        If you have any questions regarding these results, please feel free to contact me at [phone].  I wish you the best of health!      Sincerely,     Jonathan Perez MD  General Internal Medicine  [facility]

## 2024-03-04 NOTE — PROGRESS NOTES
[facility] - [facility]ists    Comprehensive and complex medical care - Chronic disease management - Shared decision making - Care coordination - Compassionate care    Patient advocacy - Rational deprescribing - Minimally disruptive medicine - Ethical focus - Customized care         Date of Service: 3/4/2024  Primary Provider: Jonathan Perez    Patient Care Team:  Jonathan Perez MD as PCP - General  Jonathan Perez MD as Assigned PCP  Speedy Torres MD as Jim Mims Dpm, DPM (Inactive) as Bianca Bhakta [facility] as Pharmacist (Pharmacist)          Patient's Pharmacy:    [facility] [id number] [facility] - [place], [place] - [address]  [address]  [place] [address]  Phone: [phone] Fax: [phone]     Patient's Contacts:  Name Home Phone Work Phone Mobile Phone Relationship Lgl STACY Sousa [phone] Son    JAY ZAPATA   [phone] Son      Patient's Insurance:    Payor: [facility] / Plan: [employer] MEDICARE / Product Type: HMO /      Subjective:     History of present illness:    Rhea Zapata is an 78 year old here for an annual wellness visit.    The issues she would like to address at today's visit include the following:    Chief Complaint   Patient presents with    Physical     AWV           3/4/2024    11:54 AM   Additional Questions   Roomed by Chris Na   Accompanied by N/A     Patient comes in today for annual wellness visit and for other issues.    Generally she is doing okay overall.  She is generally lonesome more than anything.  She does not get a lot of visitors.    We reviewed her diabetes.  She has been doing well with this.  There is been no cause for concern.  She does still smoke.    Her insurance has not been covering the insulin needles.  We should do a prior authorization for this.  [facility] has requested this.  Maybe they will let us know what they cover as well.    She been dealing with more back  pain issues.  She did have an MRI of her back in 2015 which showed moderate spinal stenosis.  She notes that when she is standing for period of time like when she does dishes it bothers her.  Both knees bother her and she has known patellofemoral arthritis as well.  She has seen [facility] for her knees.  She has had shots here with limited success.  She does have arthritis in other areas like her hands as well.  Recent x-rays of her shoulder when she had a clavicle fracture also showed glenohumeral arthritis.    We reviewed her blood pressure and this is doing well at this time.    Her depression is overall stable.    We reviewed her other issues noted in the assessment but not specifically addressed in the HPI above.           Active Problem List:  Problem List as of 3/4/2024 Reviewed: 10/2/2023  9:23 PM by Jonathan Perez MD         High    Tobacco abuse    Type 2 diabetes mellitus with diabetic polyneuropathy, with long-term current use of insulin (H)    DNR (do not resuscitate)       Medium    Primary hypertension    SHIV (obstructive sleep apnea)    Moderate major depression (H)    Stage 3a chronic kidney disease (H)       Low    Mixed hyperlipidemia    OA (osteoarthritis)    Low back pain radiating to left leg    Tubular adenoma of colon - 2017 - single 9 mm    Mixed incontinence    Microalbuminuria due to type 2 diabetes mellitus (H)    Diabetic peripheral neuropathy (H)    On angiotensin receptor blockers (ARB)    Lumbar spinal stenosis        Past Medical History:   Diagnosis Date    CKD (chronic kidney disease) stage 3, GFR 30-59 ml/min (H)     DM type 2 (diabetes mellitus, type 2) (H)     Dysthymia     HLD (hyperlipidemia)     HTN (hypertension)     Lumbar spinal stenosis 2/15/2023    OA (osteoarthritis)     SHIV (obstructive sleep apnea)     On CPAP    PN (peripheral neuropathy)     Tobacco abuse     Tubular adenoma of colon       Past Surgical History:   Procedure Laterality Date    SEPTOPLASTY       SHOULDER SURGERY   2008    TOTAL SHOULDER ARTHROPLASTY Left 2010    TYMPANOSTOMY TUBE PLACEMENT        Family History   Problem Relation Age of Onset    Colon Cancer Mother     Heart Failure Mother     Diabetes Mother     Diabetes Father     Heart Disease Father     Atrial fibrillation Sister     Obesity Sister     Sarcoidosis Sister     Heart Disease Brother     Cancer Brother         BLADDER    Diabetes Brother     Diabetes Brother     Colon Polyps Son     Colon Polyps Son       Family history is otherwise noncontributory.    Social History     Occupational History    Not on file   Tobacco Use    Smoking status: Every Day    Smokeless tobacco: Never   Vaping Use    Vaping Use: Never used   Substance and Sexual Activity    Alcohol use: Yes    Drug use: No    Sexual activity: Not on file      Social History     Social History Narrative    Retired nursing assistant.    Previous patient of Dr. Ghotra.    Goes to [facility] for [facility].        Current Outpatient Medications   Medication Instructions    acetaminophen (TYLENOL) 1,000 mg, PRN    amLODIPine (NORVASC) 2.5 MG tablet TAKE 1 TABLET BY MOUTH EVERY DAY    aspirin (ASA) 81 mg, DAILY    atorvastatin (LIPITOR) 40 MG tablet TAKE 1 TABLET BY MOUTH EVERYDAY AT BEDTIME    citalopram (CELEXA) 20 MG tablet TAKE 1 TABLET BY MOUTH EVERY DAY    fluticasone (FLONASE) 50 mcg/actuation nasal spray 2 sprays, DAILY    gabapentin (NEURONTIN) 300 MG capsule TAKE 1 CAPSULE BY MOUTH THREE TIMES A DAY    insulin needles, disposable, 31 Ndle [INSULIN NEEDLES, DISPOSABLE, 31 NDLE] Use As Directed. Use 1x/day    insulin pen needle (B-D U/F) 31G X 8 MM miscellaneous USE DAILY AS DIRECTED    lancets (TRUEPLUS LANCETS) 28 gauge Misc 3 TIMES DAILY    Lantus SoloStar 20 Units, Subcutaneous, AT BEDTIME    losartan (COZAAR) 50 MG tablet TAKE 1 TABLET BY MOUTH EVERY DAY    magnesium oxide (MAG-OX) 400 mg (241.3 mg magnesium) tablet 1 tablet, Oral, 2 TIMES DAILY    MAGNESIUM  "OXIDE 400 (240 Mg) MG tablet TAKE 1 TABLET BY MOUTH TWICE A DAY    metFORMIN (GLUCOPHAGE XR) 2,000 mg, Oral, DAILY    metoprolol succinate ER (TOPROL XL) 50 MG 24 hr tablet TAKE 1 TABLET BY MOUTH EVERY DAY    ONETOUCH VERIO IQ test strip USE 1 EACH AS DIRECTED 2 (TWO) TIMES A DAY.    oxyBUTYnin ER (DITROPAN XL) 15 mg, Oral, DAILY    torsemide (DEMADEX) 40 mg, Oral, DAILY    Vitamin D3 (CHOLECALCIFEROL) 1,000 Units, DAILY    vitamin E (TOCOPHEROL) 400 Units, DAILY      Allergies: Penicillins and Percocet [oxycodone-acetaminophen]     Immunization History   Administered Date(s) Administered    COVID-19 Bivalent 12+ (Pfizer) 02/14/2023    COVID-19 MONOVALENT 12+ (Pfizer) 05/24/2021, 06/14/2021, 12/20/2021    COVID-19 Monovalent 12+ (Pfizer 2022) 06/17/2022    DT (PEDS <7y) 10/01/1995, 04/27/2005    Flu, Unspecified 10/27/2007, 09/27/2011    Influenza (High Dose) 3 valent vaccine 11/03/2014, 02/24/2017, 09/08/2017, 01/14/2019, 11/21/2019    Influenza (IIV3) PF 10/11/2004, 10/12/2005, 10/18/2006, 10/27/2007, 12/12/2008, 09/27/2011, 09/27/2012    Influenza Vaccine 65+ (Fluzone HD) 09/21/2020, 08/18/2021, 02/14/2023, 09/05/2023    Influenza Vaccine, 6+MO IM (QUADRIVALENT W/PRESERVATIVES) 12/12/2008, 09/27/2012    Pneumococcal 23 valent 06/14/2006, 09/01/2011, 08/18/2021    TDAP (Adacel,Boostrix) 03/06/2009, 05/14/2019    Zoster recombinant adjuvanted (SHINGRIX) 05/14/2019, 07/31/2019    Zoster vaccine, live 03/06/2009      Objective:     Wt Readings from Last 3 Encounters:   03/04/24 82.6 kg (182 lb)   10/02/23 83.5 kg (184 lb)   02/14/23 81.8 kg (180 lb 6.4 oz)     BP Readings from Last 3 Encounters:   03/04/24 132/72   10/02/23 134/68   02/14/23 136/64       PHYSICAL EXAM  /72 (BP Location: Right arm, Patient Position: Sitting)   Pulse 50   Resp 18   Ht 1.66 m (5' 5.35\")   Wt 82.6 kg (182 lb)   SpO2 100%   BMI 29.96 kg/m     The patient is comfortable, no acute distress.  Mood good.  Insight is good.  No skin " lesions or nodules of concern.  Ears clear.  Eyes are nonicteric.  Pupils equal and reactive.  Throat is clear.  Neck is supple without mass, no thyromegaly. No cervical or epitrochlear adenopathy.  No axillary lymph nodes. Heart regular rate and rhythm.  Lungs clear to auscultation bilaterally.  Respiratory effort good.  Abdomen soft and nontender.  No hepatosplenomegaly.  Extremities show no edema.    Diagnostics:     Transferred Records on 12/19/2023   Component Date Value Ref Range Status    RETINOPATHY 12/19/2023 UNKNOWN   Final       Results for orders placed or performed in visit on 03/04/24   Hemoglobin A1c     Status: Abnormal   Result Value Ref Range    Hemoglobin A1C 6.5 (H) 0.0 - 5.6 %     Assessment and Plan:     1. Medicare annual wellness visit, subsequent  Stable overall.  Continue to monitor.    2. Stage 3a chronic kidney disease (H)  Continue to keep blood pressure under good control.  Continue to monitor.    3. Recurrent major depressive disorder, in partial remission (H24)  Continue current dose of Celexa and continue to monitor.    4. Type 2 diabetes mellitus with diabetic polyneuropathy, with long-term current use of insulin (H)  Check blood work.  Continue to monitor.    - Basic metabolic panel; Future  - Hemoglobin A1c; Future  - Basic metabolic panel  - Hemoglobin A1c    5. Mixed incontinence  She would like to try increasing her oxybutynin.  This was done today.  Would need to consider an additional medication if needed in the future.    - oxyBUTYnin ER (DITROPAN XL) 15 MG 24 hr tablet; Take 1 tablet (15 mg) by mouth daily for 360 days  Dispense: 90 tablet; Refill: 3    6. Spinal stenosis of lumbar region with neurogenic claudication  Discussed further workup of this or the knees but she does not want to do this at this time.    7. Primary osteoarthritis involving multiple joints         A personalized health plan based on the identified health risks was provided to the patient on the  AVS.       Jonathan Perez MD  General Internal Medicine  [facility]      Return in about 6 months (around 9/5/2024) for visit and blood work.     Future Appointments   Date Time Provider Department Center   9/5/2024 11:30 AM Jonathan Perez MD [facility]         Health Maintenance   Topic Date Due    ANNUAL REVIEW OF HM ORDERS  Never done    LUNG CANCER SCREENING  Never done    COVID-19 Vaccine (6 - 2023-24 season) 09/01/2023    MICROALBUMIN  02/14/2024    DIABETIC FOOT EXAM  02/14/2024    NICOTINE/TOBACCO CESSATION COUNSELING Q 1 YR  02/14/2024    RSV VACCINE (Pregnancy & 60+) (1 - 1-dose 60+ series) 03/04/2025 (Originally 4/22/2005)    A1C  09/04/2024    PHQ-9  09/04/2024    BMP  10/02/2024    HEMOGLOBIN  10/02/2024    EYE EXAM  12/19/2024    MEDICARE ANNUAL WELLNESS VISIT  03/04/2025    FALL RISK ASSESSMENT  03/04/2025    LIPID  02/14/2026    ADVANCE CARE PLANNING  02/15/2028    DTAP/TDAP/TD IMMUNIZATION (3 - Td or Tdap) 05/14/2029    DEXA  11/21/2037    INFLUENZA VACCINE  Completed    URINALYSIS  Completed    ZOSTER IMMUNIZATION  Completed    DEPRESSION ACTION PLAN  Addressed    IPV IMMUNIZATION  Aged Out    HPV IMMUNIZATION  Aged Out    MENINGITIS IMMUNIZATION  Aged Out    RSV MONOCLONAL ANTIBODY  Aged Out    HEPATITIS C SCREENING  Discontinued    Pneumococcal Vaccine: 65+ Years  Discontinued    COLORECTAL CANCER SCREENING  Discontinued        ______________________________________________________________________     Additional required elements:    I have reviewed Opioid Use Disorder and Substance Use Disorder risk factors and made any needed referrals.  This may not apply to this individual patient, but this documentation is required by Medicare.    Memory screen:      3/4/2024    11:57 AM   MINI COG   Clock Draw Score 2 Normal   3 Item Recall 3 objects recalled   Mini Cog Total Score 5        PHQ-2 Score:         11/30/2021     1:23 PM   PHQ-2 ( 1999 Pfizer)   Q1: Little  interest or pleasure in doing things Not at all   Q2: Feeling down, depressed or hopeless Not at all   PHQ-2 Score 0        Advanced care planning reviewed.        3/4/2024   Fall Risk   Fallen 2 or more times in the past year? No    No   Trouble with walking or balance? Yes    Yes   Gait Speed Test (Document in seconds) 4.3   Gait Speed Test Interpretation Less than or equal to 5.00 seconds - PASS          3/4/2024   Substance Use   Alcohol more than 3/day or more than 7/wk No   Do you have a current opioid prescription? No   How severe/bad is pain from 1 to 10? 6/10   Do you use any other substances recreationally? No       Last vision screening (if done):       No data to display

## 2024-03-04 NOTE — COMMUNITY RESOURCES LIST (ENGLISH)
03/04/2024    [facility]  N/A  For questions about this resource list or additional care needs, please contact your primary care clinic or care manager.  Phone: [phone]   Email: N/A   Address: [address] [place] [address]   Hours: N/A        Exercise and Recreation       Gym or workout facility  1  [employer] [facility] [employer] Distance: 1.16 miles      In-Person   [address] [place] [address]  Language: English  Hours: Mon - Fri 5:00 AM - 9:00 PM , Sat - Sun 7:00 AM - 5:00 PM  Fees: Free, Self Pay, Sliding Fee   Phone: [phone]) [phone] Email: info@[employer].org Website: [email or web address]_[email or web address]     2  [employer] [facility] [place] [address] Distance: 2.08 miles      In-Person   [address] [place] [address]  Language: English  Hours: Mon - Sun Open 24 Hours  Fees: Insurance, Self Pay, Sliding Fee   Phone: (922) [phone] Email: [email or web address] Website: [email or web address]          Important Numbers & Websites       Emergency Services   911  [facility]   311  Poison Control   (252) 655-1621  Suicide Prevention Lifeline   (282) 417-7517 (TALK)  Child Abuse Hotline   (619) 749-5705 (4-A-Child)  Sexual Assault Hotline   (446) 986-9135 (HOPE)  National Runaway Safeline   (954) 595-5486 (RUNAWAY)  All-Options Talkline   (559) 168-7880  Substance Abuse Referral   (465) 697-4171 (HELP)

## 2024-03-04 NOTE — PATIENT INSTRUCTIONS
Preventing Falls: Care Instructions  Injuries and health problems such as trouble walking or poor eyesight can increase your risk of falling. So can some medicines. But there are things you can do to help prevent falls. You can exercise to get stronger. You can also arrange your home to make it safer.    Talk to your doctor about the medicines you take. Ask if any of them increase the risk of falls and whether they can be changed or stopped.   Try to exercise regularly. It can help improve your strength and balance. This can help lower your risk of falling.     Practice fall safety and prevention.    Wear low-heeled shoes that fit well and give your feet good support. Talk to your doctor if you have foot problems that make this hard.  Carry a cellphone or wear a medical alert device that you can use to call for help.  Use stepladders instead of chairs to reach high objects. Don't climb if you're at risk for falls. Ask for help, if needed.  Wear the correct eyeglasses, if you need them.    Make your home safer.    Remove rugs, cords, clutter, and furniture from walkways.  Keep your house well lit. Use night-lights in hallways and bathrooms.  Install and use sturdy handrails on stairways.  Wear nonskid footwear, even inside. Don't walk barefoot or in socks without shoes.    Be safe outside.    Use handrails, curb cuts, and ramps whenever possible.  Keep your hands free by using a shoulder bag or backpack.  Try to walk in well-lit areas. Watch out for uneven ground, changes in pavement, and debris.  Be careful in the winter. Walk on the grass or gravel when sidewalks are slippery. Use de-icer on steps and walkways. Add non-slip devices to shoes.    Put grab bars and nonskid mats in your shower or tub and near the toilet. Try to use a shower chair or bath bench when bathing.   Get into a tub or shower by putting in your weaker leg first. Get out with your strong side first. Have a phone or medical alert device in the  "bathroom with you.   Where can you learn more?  Go to https://www.[employer].net/patiented  Enter G117 in the search box to learn more about \"Preventing Falls: Care Instructions.\"  Current as of: July 18, 2023               Content Version: 13.8    3103-8414 [employer].   Care instructions adapted under license by your healthcare professional. If you have questions about a medical condition or this instruction, always ask your healthcare professional. [employer] disclaims any warranty or liability for your use of this information.      Hearing Loss: Care Instructions  Overview     Hearing loss is a sudden or slow decrease in how well you hear. It can range from slight to profound. Permanent hearing loss can occur with aging. It also can happen when you are exposed long-term to loud noise. Examples include listening to loud music, riding motorcycles, or being around other loud machines.  Hearing loss can affect your work and home life. It can make you feel lonely or depressed. You may feel that you have lost your independence. But hearing aids and other devices can help you hear better and feel connected to others.  Follow-up care is a key part of your treatment and safety. Be sure to make and go to all appointments, and call your doctor if you are having problems. It's also a good idea to know your test results and keep a list of the medicines you take.  How can you care for yourself at home?  Avoid loud noises whenever possible. This helps keep your hearing from getting worse.  Always wear hearing protection around loud noises.  Wear a hearing aid as directed.  A professional can help you pick a hearing aid that will work best for you.  You can also get hearing aids over the counter for mild to moderate hearing loss.  Have hearing tests as your doctor suggests. They can show whether your hearing has changed. Your hearing aid may need to be adjusted.  Use other devices as needed. These " "may include:  Telephone amplifiers and hearing aids that can connect to a television, stereo, radio, or microphone.  Devices that use lights or vibrations. These alert you to the doorbell, a ringing telephone, or a baby monitor.  Television closed-captioning. This shows the words at the bottom of the screen. Most new TVs can do this.  TTY (text telephone). This lets you type messages back and forth on the telephone instead of talking or listening. These devices are also called TDD. When messages are typed on the keyboard, they are sent over the phone line to a receiving TTY. The message is shown on a monitor.  Use text messaging, social media, and email if it is hard for you to communicate by telephone.  Try to learn a listening technique called speechreading. It is not lipreading. You pay attention to people's gestures, expressions, posture, and tone of voice. These clues can help you understand what a person is saying. Face the person you are talking to, and have them face you. Make sure the lighting is good. You need to see the other person's face clearly.  Think about counseling if you need help to adjust to your hearing loss.  When should you call for help?  Watch closely for changes in your health, and be sure to contact your doctor if:    You think your hearing is getting worse.     You have new symptoms, such as dizziness or nausea.   Where can you learn more?  Go to https://www.[employer].net/patiented  Enter R798 in the search box to learn more about \"Hearing Loss: Care Instructions.\"  Current as of: February 28, 2023               Content Version: 13.8    5591-5431 [employer].   Care instructions adapted under license by your healthcare professional. If you have questions about a medical condition or this instruction, always ask your healthcare professional. [employer] disclaims any warranty or liability for your use of this information.      Learning About Stress  What is " stress?     Stress is your body's response to a hard situation. Your body can have a physical, emotional, or mental response. Stress is a fact of life for most people, and it affects everyone differently. What causes stress for you may not be stressful for someone else.  A lot of things can cause stress. You may feel stress when you go on a job interview, take a test, or run a race. This kind of short-term stress is normal and even useful. It can help you if you need to work hard or react quickly. For example, stress can help you finish an important job on time.  Long-term stress is caused by ongoing stressful situations or events. Examples of long-term stress include long-term health problems, ongoing problems at work, or conflicts in your family. Long-term stress can harm your health.  How does stress affect your health?  When you are stressed, your body responds as though you are in danger. It makes hormones that speed up your heart, make you breathe faster, and give you a burst of energy. This is called the fight-or-flight stress response. If the stress is over quickly, your body goes back to normal and no harm is done.  But if stress happens too often or lasts too long, it can have bad effects. Long-term stress can make you more likely to get sick, and it can make symptoms of some diseases worse. If you tense up when you are stressed, you may develop neck, shoulder, or low back pain. Stress is linked to high blood pressure and heart disease.  Stress also harms your emotional health. It can make you danielson, tense, or depressed. Your relationships may suffer, and you may not do well at work or school.  What can you do to manage stress?  You can try these things to help manage stress:   Do something active. Exercise or activity can help reduce stress. Walking is a great way to get started. Even everyday activities such as housecleaning or yard work can help.  Try yoga or jeremy chi. These techniques combine exercise  and meditation. You may need some training at first to learn them.  Do something you enjoy. For example, listen to music or go to a movie. Practice your hobby or do volunteer work.  Meditate. This can help you relax, because you are not worrying about what happened before or what may happen in the future.  Do guided imagery. Imagine yourself in any setting that helps you feel calm. You can use online videos, books, or a teacher to guide you.  Do breathing exercises. For example:  From a standing position, bend forward from the waist with your knees slightly bent. Let your arms dangle close to the floor.  Breathe in slowly and deeply as you return to a standing position. Roll up slowly and lift your head last.  Hold your breath for just a few seconds in the standing position.  Breathe out slowly and bend forward from the waist.  Let your feelings out. Talk, laugh, cry, and express anger when you need to. Talking with supportive friends or family, a counselor, or a radha leader about your feelings is a healthy way to relieve stress. Avoid discussing your feelings with people who make you feel worse.  Write. It may help to write about things that are bothering you. This helps you find out how much stress you feel and what is causing it. When you know this, you can find better ways to cope.  What can you do to prevent stress?  You might try some of these things to help prevent stress:  Manage your time. This helps you find time to do the things you want and need to do.  Get enough sleep. Your body recovers from the stresses of the day while you are sleeping.  Get support. Your family, friends, and community can make a difference in how you experience stress.  Limit your news feed. Avoid or limit time on social media or news that may make you feel stressed.  Do something active. Exercise or activity can help reduce stress. Walking is a great way to get started.  Where can you learn more?  Go to  "https://www.[employer].net/patiented  Enter N032 in the search box to learn more about \"Learning About Stress.\"  Current as of: February 26, 2023               Content Version: 13.8 2006-2023 [email or web address].   Care instructions adapted under license by your healthcare professional. If you have questions about a medical condition or this instruction, always ask your healthcare professional. [email or web address] disclaims any warranty or liability for your use of this information.      Bladder Training: Care Instructions  Your Care Instructions     Bladder training is used to treat urge incontinence and stress incontinence. Urge incontinence means that the need to urinate comes on so fast that you can't get to a toilet in time. Stress incontinence means that you leak urine because of pressure on your bladder. For example, it may happen when you laugh, cough, or lift something heavy.  Bladder training can increase how long you can wait before you have to urinate. It can also help your bladder hold more urine. And it can give you better control over the urge to urinate.  It is important to remember that bladder training takes a few weeks to a few months to make a difference. You may not see results right away, but don't give up.  Follow-up care is a key part of your treatment and safety. Be sure to make and go to all appointments, and call your doctor if you are having problems. It's also a good idea to know your test results and keep a list of the medicines you take.  How can you care for yourself at home?  Work with your doctor to come up with a bladder training program that is right for you. You may use one or more of the following methods.  Delayed urination  In the beginning, try to keep from urinating for 5 minutes after you first feel the need to go.  While you wait, take deep, slow breaths to relax. Kegel exercises can also help you delay the need to go to the bathroom.  After some practice, " "when you can easily wait 5 minutes to urinate, try to wait 10 minutes before you urinate.  Slowly increase the waiting period until you are able to control when you have to urinate.  Scheduled urination  Empty your bladder when you first wake up in the morning.  Schedule times throughout the day when you will urinate.  Start by going to the bathroom every hour, even if you don't need to go.  Slowly increase the time between trips to the bathroom.  When you have found a schedule that works well for you, keep doing it.  If you wake up during the night and have to urinate, do it. Apply your schedule to waking hours only.  Kegel exercises  These tighten and strengthen pelvic muscles, which can help you control the flow of urine. (If doing these exercises causes pain, stop doing them and talk with your doctor.) To do Kegel exercises:  Squeeze your muscles as if you were trying not to pass gas. Or squeeze your muscles as if you were stopping the flow of urine. Your belly, legs, and buttocks shouldn't move.  Hold the squeeze for 3 seconds, then relax for 5 to 10 seconds.  Start with 3 seconds, then add 1 second each week until you are able to squeeze for 10 seconds.  Repeat the exercise 10 times a session. Do 3 to 8 sessions a day.  When should you call for help?  Watch closely for changes in your health, and be sure to contact your doctor if:    Your incontinence is getting worse.     You do not get better as expected.   Where can you learn more?  Go to https://www."[employer].net/patiented  Enter V684 in the search box to learn more about \"Bladder Training: Care Instructions.\"  Current as of: February 28, 2023               Content Version: 13.8    8811-7406 [employer].   Care instructions adapted under license by your healthcare professional. If you have questions about a medical condition or this instruction, always ask your healthcare professional. [employer] disclaims any warranty or " liability for your use of this information.      Learning About Depression Screening  What is depression screening?  Depression screening is a way to see if you have depression symptoms. It may be done by a doctor or counselor. It's often part of a routine checkup. That's because your mental health is just as important as your physical health.  Depression is a mental health condition that affects how you feel, think, and act. You may:  Have less energy.  Lose interest in your daily activities.  Feel sad and grouchy for a long time.  Depression is very common. It affects people of all ages.  Many things can lead to depression. Some people become depressed after they have a stroke or find out they have a major illness like cancer or heart disease. The death of a loved one or a breakup may lead to depression. It can run in families. Most experts believe that a combination of inherited genes and stressful life events can cause it.  What happens during screening?  You may be asked to fill out a form about your depression symptoms. You and the doctor will discuss your answers. The doctor may ask you more questions to learn more about how you think, act, and feel.  What happens after screening?  If you have symptoms of depression, your doctor will talk to you about your options.  Doctors usually treat depression with medicines or counseling. Often, combining the two works best. Many people don't get help because they think that they'll get over the depression on their own. But people with depression may not get better unless they get treatment.  The cause of depression is not well understood. There may be many factors involved. But if you have depression, it's not your fault.  A serious symptom of depression is thinking about death or suicide. If you or someone you care about talks about this or about feeling hopeless, get help right away.  It's important to know that depression can be treated. Medicine, counseling, and  "self-care may help.  Where can you learn more?  Go to https://www.[employer].net/patiented  Enter T185 in the search box to learn more about \"Learning About Depression Screening.\"  Current as of: June 25, 2023               Content Version: 13.8    8297-9708 [employer].   Care instructions adapted under license by your healthcare professional. If you have questions about a medical condition or this instruction, always ask your healthcare professional. [employer] disclaims any warranty or liability for your use of this information.  Future Appointments   Date Time Provider Department Center   9/5/2024 11:30 AM Jonathan Perez MD [facility]        "

## 2024-03-04 NOTE — TELEPHONE ENCOUNTER
Aly has asked us to do a prior authorization for insulin needles.  She needs these needles for her diabetes and for administration of insulin.    Thank you,    Jonathan Perez MD  General Internal Medicine  [facility]  3/4/2024, 2:55 PM

## 2024-03-13 DIAGNOSIS — F32.1 MODERATE MAJOR DEPRESSION (H): ICD-10-CM

## 2024-03-13 RX ORDER — CITALOPRAM HYDROBROMIDE 20 MG/1
TABLET ORAL
Qty: 90 TABLET | Refills: 3 | Status: SHIPPED | OUTPATIENT
Start: 2024-03-13

## 2024-03-15 NOTE — TELEPHONE ENCOUNTER
PA Initiation    Medication: BD PEN NEEDLE SHORT U/F 31G X 8 MM MISC  Insurance Company: [employer] - Phone [phone] Fax [phone]  Pharmacy Filling the Rx: [id number] [place] [facility] [place], [place] [address]  Filling Pharmacy Phone: [phone]  Filling Pharmacy Fax: [phone]  Start Date: 3/15/2024        Note: Due to record-high volumes, our turn-around time is taking longer than usual . We are currently 10 business days behind in the pools.   We are working diligently to submit all requests in a timely manner and in the order they are received. Please only flag TRUE URGENT requests as high priority to the pool at this time.   If you have questions - please send a note/message in the active PA encounter and send back to the RPPA (Retail Pharmacy Prior Authorization) team [[id number]].    If you have more specific questions about our process please reach out to our supervisor Alexa Cortez.   Thank you!

## 2024-03-18 NOTE — TELEPHONE ENCOUNTER
Prior Authorization Approval    Medication: BD PEN NEEDLE SHORT U/F 31G X 8 MM MISC  Authorization Effective Date: 3/16/2024  Authorization Expiration Date: 3/15/2025  Approved Dose/Quantity:         Reference #:     Insurance Company: [employer] - Phone [phone] Fax [phone]  Expected CoPay: $    CoPay Card Available:      Financial Assistance Needed:   Which Pharmacy is filling the prescription: [facility] [id number] [place] [facility] - [place], [place] - [address]  Pharmacy Notified: Y  Patient Notified: Pharmacy will notify patient once order is ready.

## 2024-04-06 DIAGNOSIS — Z79.4 TYPE 2 DIABETES MELLITUS WITH DIABETIC POLYNEUROPATHY, WITH LONG-TERM CURRENT USE OF INSULIN (H): ICD-10-CM

## 2024-04-06 DIAGNOSIS — E11.42 TYPE 2 DIABETES MELLITUS WITH DIABETIC POLYNEUROPATHY, WITH LONG-TERM CURRENT USE OF INSULIN (H): ICD-10-CM

## 2024-04-08 RX ORDER — INSULIN GLARGINE 100 [IU]/ML
20 INJECTION, SOLUTION SUBCUTANEOUS AT BEDTIME
Qty: 18 ML | Refills: 3 | Status: SHIPPED | OUTPATIENT
Start: 2024-04-08

## 2024-04-19 DIAGNOSIS — M54.16 LUMBAR RADICULAR PAIN: ICD-10-CM

## 2024-04-19 DIAGNOSIS — E78.2 MIXED HYPERLIPIDEMIA: ICD-10-CM

## 2024-04-19 DIAGNOSIS — I10 ESSENTIAL HYPERTENSION: ICD-10-CM

## 2024-04-19 RX ORDER — ATORVASTATIN CALCIUM 40 MG/1
TABLET, FILM COATED ORAL
Qty: 90 TABLET | Refills: 3 | Status: SHIPPED | OUTPATIENT
Start: 2024-04-19

## 2024-04-19 RX ORDER — GABAPENTIN 300 MG/1
CAPSULE ORAL
Qty: 270 CAPSULE | Refills: 3 | Status: SHIPPED | OUTPATIENT
Start: 2024-04-19

## 2024-04-19 RX ORDER — METOPROLOL SUCCINATE 50 MG/1
50 TABLET, EXTENDED RELEASE ORAL DAILY
Qty: 90 TABLET | Refills: 3 | Status: SHIPPED | OUTPATIENT
Start: 2024-04-19

## 2024-04-19 RX ORDER — METOPROLOL SUCCINATE 50 MG/1
TABLET, EXTENDED RELEASE ORAL
Qty: 90 TABLET | Refills: 0 | Status: SHIPPED | OUTPATIENT
Start: 2024-04-19 | End: 2024-04-19

## 2024-06-06 DIAGNOSIS — E11.9 DM TYPE 2 (DIABETES MELLITUS, TYPE 2) (H): ICD-10-CM

## 2024-06-07 RX ORDER — PEN NEEDLE, DIABETIC 31 GX5/16"
NEEDLE, DISPOSABLE MISCELLANEOUS
Qty: 100 EACH | Refills: 3 | Status: SHIPPED | OUTPATIENT
Start: 2024-06-07

## 2024-06-13 DIAGNOSIS — Z79.4 TYPE 2 DIABETES MELLITUS WITH DIABETIC POLYNEUROPATHY, WITH LONG-TERM CURRENT USE OF INSULIN (H): ICD-10-CM

## 2024-06-13 DIAGNOSIS — I10 ESSENTIAL HYPERTENSION: ICD-10-CM

## 2024-06-13 DIAGNOSIS — E11.42 TYPE 2 DIABETES MELLITUS WITH DIABETIC POLYNEUROPATHY, WITH LONG-TERM CURRENT USE OF INSULIN (H): ICD-10-CM

## 2024-06-13 RX ORDER — TORSEMIDE 20 MG/1
40 TABLET ORAL DAILY
Qty: 180 TABLET | Refills: 1 | Status: SHIPPED | OUTPATIENT
Start: 2024-06-13

## 2024-06-13 RX ORDER — METFORMIN HCL 500 MG
2000 TABLET, EXTENDED RELEASE 24 HR ORAL DAILY
Qty: 360 TABLET | Refills: 1 | Status: SHIPPED | OUTPATIENT
Start: 2024-06-13 | End: 2024-09-09

## 2024-07-30 DIAGNOSIS — I10 ESSENTIAL HYPERTENSION: ICD-10-CM

## 2024-07-30 RX ORDER — LOSARTAN POTASSIUM 50 MG/1
TABLET ORAL
Qty: 90 TABLET | Refills: 3 | Status: SHIPPED | OUTPATIENT
Start: 2024-07-30

## 2024-09-05 ENCOUNTER — OFFICE VISIT (OUTPATIENT)
Dept: INTERNAL MEDICINE | Facility: CLINIC | Age: 79
End: 2024-09-05
Payer: COMMERCIAL

## 2024-09-05 DIAGNOSIS — N18.31 STAGE 3A CHRONIC KIDNEY DISEASE (H): Primary | ICD-10-CM

## 2024-09-05 DIAGNOSIS — E83.42 HYPOMAGNESEMIA: ICD-10-CM

## 2024-09-05 DIAGNOSIS — Z79.4 TYPE 2 DIABETES MELLITUS WITH DIABETIC POLYNEUROPATHY, WITH LONG-TERM CURRENT USE OF INSULIN (H): Chronic | ICD-10-CM

## 2024-09-05 DIAGNOSIS — E11.42 DIABETIC PERIPHERAL NEUROPATHY (H): ICD-10-CM

## 2024-09-05 DIAGNOSIS — I10 PRIMARY HYPERTENSION: Chronic | ICD-10-CM

## 2024-09-05 DIAGNOSIS — E11.42 TYPE 2 DIABETES MELLITUS WITH DIABETIC POLYNEUROPATHY, WITH LONG-TERM CURRENT USE OF INSULIN (H): Chronic | ICD-10-CM

## 2024-09-05 LAB
ANION GAP SERPL CALCULATED.3IONS-SCNC: 13 MMOL/L (ref 7–15)
BUN SERPL-MCNC: 20.6 MG/DL (ref 8–23)
CALCIUM SERPL-MCNC: 9.4 MG/DL (ref 8.8–10.4)
CHLORIDE SERPL-SCNC: 103 MMOL/L (ref 98–107)
CREAT SERPL-MCNC: 1.6 MG/DL (ref 0.51–0.95)
CREAT SERPL-MCNC: 1.6 MG/DL (ref 0.51–0.95)
EGFRCR SERPLBLD CKD-EPI 2021: 32 ML/MIN/1.73M2
GLUCOSE SERPL-MCNC: 78 MG/DL (ref 70–99)
HBA1C MFR BLD: 6.2 % (ref 0–5.6)
HCO3 SERPL-SCNC: 23 MMOL/L (ref 22–29)
MAGNESIUM SERPL-MCNC: 2.1 MG/DL (ref 1.7–2.3)
POTASSIUM SERPL-SCNC: 4.8 MMOL/L (ref 3.4–5.3)
SODIUM SERPL-SCNC: 139 MMOL/L (ref 135–145)
SODIUM SERPL-SCNC: 139 MMOL/L (ref 135–145)

## 2024-09-05 PROCEDURE — 83036 HEMOGLOBIN GLYCOSYLATED A1C: CPT | Performed by: INTERNAL MEDICINE

## 2024-09-05 PROCEDURE — 90662 IIV NO PRSV INCREASED AG IM: CPT | Performed by: INTERNAL MEDICINE

## 2024-09-05 PROCEDURE — 99214 OFFICE O/P EST MOD 30 MIN: CPT | Performed by: INTERNAL MEDICINE

## 2024-09-05 PROCEDURE — 84295 ASSAY OF SERUM SODIUM: CPT | Performed by: INTERNAL MEDICINE

## 2024-09-05 PROCEDURE — 36415 COLL VENOUS BLD VENIPUNCTURE: CPT | Performed by: INTERNAL MEDICINE

## 2024-09-05 PROCEDURE — 80048 BASIC METABOLIC PNL TOTAL CA: CPT | Performed by: INTERNAL MEDICINE

## 2024-09-05 PROCEDURE — G0008 ADMIN INFLUENZA VIRUS VAC: HCPCS | Performed by: INTERNAL MEDICINE

## 2024-09-05 PROCEDURE — 83735 ASSAY OF MAGNESIUM: CPT | Performed by: INTERNAL MEDICINE

## 2024-09-05 RX ORDER — SENNOSIDES 8.6 MG
1300 CAPSULE ORAL 2 TIMES DAILY
COMMUNITY

## 2024-09-05 RX ORDER — MAGNESIUM OXIDE 400 MG/1
400 TABLET ORAL 2 TIMES DAILY
Qty: 180 TABLET | Refills: 3 | Status: SHIPPED | OUTPATIENT
Start: 2024-09-05

## 2024-09-05 ASSESSMENT — PATIENT HEALTH QUESTIONNAIRE - PHQ9
10. IF YOU CHECKED OFF ANY PROBLEMS, HOW DIFFICULT HAVE THESE PROBLEMS MADE IT FOR YOU TO DO YOUR WORK, TAKE CARE OF THINGS AT HOME, OR GET ALONG WITH OTHER PEOPLE: EXTREMELY DIFFICULT
SUM OF ALL RESPONSES TO PHQ QUESTIONS 1-9: 18
SUM OF ALL RESPONSES TO PHQ QUESTIONS 1-9: 18

## 2024-09-05 ASSESSMENT — PAIN SCALES - GENERAL: PAINLEVEL: EXTREME PAIN (8)

## 2024-09-05 NOTE — PROGRESS NOTES
Wt Readings from Last 20 Encounters:   09/05/24 83.5 kg (184 lb 1.6 oz)   03/04/24 82.6 kg (182 lb)   10/02/23 83.5 kg (184 lb)   02/14/23 81.8 kg (180 lb 6.4 oz)   06/17/22 80.3 kg (177 lb)   06/09/22 82.1 kg (181 lb)   03/28/22 83.5 kg (184 lb 1.6 oz)   03/01/22 83.9 kg (185 lb)   11/30/21 84 kg (185 lb 3.2 oz)   02/23/21 86.1 kg (189 lb 14.4 oz)   01/31/20 87.7 kg (193 lb 6.4 oz)   05/13/19 87.1 kg (192 lb)   01/14/19 89 kg (196 lb 3.2 oz)   05/21/18 92.7 kg (204 lb 6.4 oz)   09/08/17 94.3 kg (208 lb)   02/24/17 95.3 kg (210 lb 3.2 oz)   08/30/16 96.6 kg (213 lb)   02/18/16 95.3 kg (210 lb)   11/02/15 93.9 kg (207 lb)   07/20/15 95.1 kg (209 lb 9.6 oz)     BP Readings from Last 20 Encounters:   09/05/24 (!) 153/73   03/04/24 132/72   10/02/23 134/68   02/14/23 136/64   06/17/22 (!) 168/82   06/09/22 (!) 188/80   03/28/22 (!) 178/70   12/02/21 (!) 168/70   02/25/21 136/76   08/21/20 134/78   02/21/20 136/74   01/31/20 136/70      Pulse Readings from Last 20 Encounters:   09/05/24 71   03/04/24 50   10/02/23 72   02/14/23 70   06/17/22 70   06/09/22 74   03/28/22 76   11/30/21 76   02/23/21 76   08/21/20 74   02/21/20 71   01/31/20 79     SpO2 Readings from Last 20 Encounters:   09/05/24 99%   03/04/24 100%   10/02/23 98%   02/14/23 98%   06/17/22 98%   06/09/22 98%   03/28/22 96%   11/30/21 96%   02/23/21 98%   08/21/20 97%   01/31/20 97%

## 2024-09-05 NOTE — LETTER
9/9/2024    Rhea Jasmine   [address] [place] [address]         Dear Rhea,    It was good to see you in clinic.  I hope your questions were answered at the time of your visit.    The results of your tests from your visit were as follows:    Resulted Orders   HEMOGLOBIN A1C   Result Value Ref Range    Hemoglobin A1C 6.2 (H) 0.0 - 5.6 %   Basic metabolic panel   Result Value Ref Range    Sodium 139 135 - 145 mmol/L    Potassium 4.8 3.4 - 5.3 mmol/L    Chloride 103 98 - 107 mmol/L    Carbon Dioxide (CO2) 23 22 - 29 mmol/L    Anion Gap 13 7 - 15 mmol/L    Urea Nitrogen 20.6 8.0 - 23.0 mg/dL    Creatinine 1.60 (H) 0.51 - 0.95 mg/dL    GFR Estimate 32 (L) >60 mL/min/1.73m2    Calcium 9.4 8.8 - 10.4 mg/dL    Glucose 78 70 - 99 mg/dL   Albumin Random Urine Quantitative with Creat Ratio   Result Value Ref Range    Albumin Urine mg/g Cr 31.44 (H) 0.00 - 25.00 mg/g Cr   Magnesium   Result Value Ref Range    Magnesium 2.1 1.7 - 2.3 mg/dL     Your electrolytes were normal.  Your kidney tests remain slightly elevated but overall stable over the years.    You have a very small amount of protein in your urine at this time and this is not concerning.    .nl    Please follow up again in ***    If you have any questions regarding these results, please feel free to contact me at [phone].  I wish you the best of health!      Sincerely,       Jonathan Perez MD  General Internal Medicine  [facility]

## 2024-09-06 VITALS
TEMPERATURE: 98.3 F | OXYGEN SATURATION: 99 % | SYSTOLIC BLOOD PRESSURE: 138 MMHG | RESPIRATION RATE: 20 BRPM | BODY MASS INDEX: 30.67 KG/M2 | HEART RATE: 71 BPM | HEIGHT: 65 IN | WEIGHT: 184.1 LBS | DIASTOLIC BLOOD PRESSURE: 78 MMHG

## 2024-09-06 LAB
CREAT UR-MCNC: 66.8 MG/DL
MICROALBUMIN UR-MCNC: 21 MG/L
MICROALBUMIN/CREAT UR: 31.44 MG/G CR (ref 0–25)

## 2024-09-06 PROCEDURE — 82570 ASSAY OF URINE CREATININE: CPT | Performed by: INTERNAL MEDICINE

## 2024-09-06 PROCEDURE — 82043 UR ALBUMIN QUANTITATIVE: CPT | Performed by: INTERNAL MEDICINE

## 2024-09-06 NOTE — PATIENT INSTRUCTIONS
Future Appointments   Date Time Provider Department Center   3/6/2025 10:30 AM Jonathan Perez MD [facility]

## 2024-09-06 NOTE — PROGRESS NOTES
[facility]    Comprehensive and complex medical care - Chronic disease management - Shared decision making - Care coordination - Compassionate care    Patient advocacy - Rational deprescribing - Minimally disruptive medicine - Ethical focus - Customized care         Date of Service: 9/5/2024  Primary Provider: Jonathan Perez    Patient Care Team:  Jonathan Perez MD as PCP - General  Jonathan Perez MD as Assigned PCP  Speedy Torres MD as Jim Mims Dpm, DPM (Inactive) as Bianca Bhakta [facility] as Pharmacist (Pharmacist)          Patient's Pharmacy:    [facility] [id number] [facility] - [place], [place] - [address]  [address]  [place] [address]  Phone: [phone] Fax: [phone]     Patient's Contacts:  Name Home Phone Work Phone Mobile Phone Relationship Lgl Sp   BENNYSTACY [phone] Kimani ZAPATAJAY   [phone] Son      Patient's Insurance:    Payor: [facility] / Plan: [employer] MEDICARE / Product Type: HMO /           Active Problem List:  Problem List as of 9/5/2024 Reviewed: 10/2/2023  9:23 PM by Jonathan Perez MD         High    Tobacco abuse    Type 2 diabetes mellitus with diabetic polyneuropathy, with long-term current use of insulin (H)    DNR (do not resuscitate)       Medium    Primary hypertension    SHIV (obstructive sleep apnea)    Moderate major depression (H)    Stage 3a chronic kidney disease (H)       Low    Mixed hyperlipidemia    OA (osteoarthritis)    Low back pain radiating to left leg    Tubular adenoma of colon - 2017 - single 9 mm    Mixed incontinence    Microalbuminuria due to type 2 diabetes mellitus (H)    Diabetic peripheral neuropathy (H)    On angiotensin receptor blockers (ARB)    Lumbar spinal stenosis        Current Outpatient Medications   Medication Instructions    acetaminophen (TYLENOL) 1,300 mg, Oral, 2 TIMES DAILY    amLODIPine (NORVASC) 2.5 MG tablet TAKE 1  TABLET BY MOUTH EVERY DAY    aspirin (ASA) 81 mg, DAILY    atorvastatin (LIPITOR) 40 MG tablet TAKE 1 TABLET BY MOUTH EVERYDAY AT BEDTIME    citalopram (CELEXA) 20 MG tablet TAKE 1 TABLET BY MOUTH EVERY DAY    fluticasone (FLONASE) 50 mcg/actuation nasal spray 2 sprays, DAILY    gabapentin (NEURONTIN) 300 MG capsule TAKE 1 CAPSULE BY MOUTH THREE TIMES A DAY    insulin needles, disposable, 31 Ndle [INSULIN NEEDLES, DISPOSABLE, 31 NDLE] Use As Directed. Use 1x/day    insulin pen needle (B-D U/F) 31G X 8 MM miscellaneous USE DAILY AS DIRECTED    lancets (TRUEPLUS LANCETS) 28 gauge Misc 3 TIMES DAILY    Lantus SoloStar 20 Units, Subcutaneous, AT BEDTIME    losartan (COZAAR) 50 MG tablet TAKE 1 TABLET BY MOUTH EVERY DAY    magnesium oxide (MAG-OX) 400 mg (241.3 mg magnesium) tablet 1 tablet, Oral, 2 TIMES DAILY    magnesium oxide (MAG-OX) 400 mg, Oral, 2 TIMES DAILY    metFORMIN (GLUCOPHAGE XR) 2,000 mg, Oral, DAILY    metoprolol succinate ER (TOPROL XL) 50 mg, Oral, DAILY    ONETOUCH VERIO IQ test strip USE 1 EACH AS DIRECTED 2 (TWO) TIMES A DAY.    oxyBUTYnin ER (DITROPAN XL) 15 mg, Oral, DAILY    torsemide (DEMADEX) 40 mg, Oral, DAILY    Vitamin D3 (CHOLECALCIFEROL) 1,000 Units, DAILY    vitamin E (TOCOPHEROL) 400 Units, DAILY     Social History     Social History Narrative    Retired nursing assistant.    Previous patient of Dr. Ghotra.    Goes to [facility] for exercise.         Subjective:     Rhea Jasmine is a 79 year old female who comes in today for:    Chief Complaint   Patient presents with    Follow Up          9/5/2024    11:20 AM   Additional Questions   Roomed by MARIOLA Hunt   Accompanied by LARRY     In for follow up multiple issues.    First reviewed her diabetes and her management of this.  No concerns.    Next reviewed her blood pressure and initially her blood pressure was high today but better on recheck.  No new symptoms.    Wonders about reducing gabapentin (Neurontin) now or in  "the future if able.    Will follow up on her kidneys as well.    Discussed tobacco abuse as well.    We reviewed her other issues noted in the assessment but not specifically addressed in the HPI above.     Objective:     Wt Readings from Last 3 Encounters:   09/05/24 83.5 kg (184 lb 1.6 oz)   03/04/24 82.6 kg (182 lb)   10/02/23 83.5 kg (184 lb)     BP Readings from Last 3 Encounters:   09/06/24 138/78   03/04/24 132/72   10/02/23 134/68     /78   Pulse 71   Temp 98.3  F (36.8  C) (Oral)   Resp 20   Ht 1.66 m (5' 5.35\")   Wt 83.5 kg (184 lb 1.6 oz)   LMP  (LMP Unknown)   SpO2 99%   BMI 30.31 kg/m     The patient is comfortable, no acute distress.  Mood slightly flat to good.  Insight good.  Eyes are nonicteric.  Neck is supple without mass.  No cervical adenopathy.  No thyromegaly. Heart regular rate and rhythm.  Lungs clear to auscultation bilaterally.  Respiratory effort is good.  Abdomen soft and nontender.  No hepatosplenomegaly.  Extremities no edema.      Diagnostics:     Office Visit on 03/04/2024   Component Date Value Ref Range Status    Sodium 03/04/2024 133 (L)  135 - 145 mmol/L Final    Reference intervals for this test were updated on 09/26/2023 to more accurately reflect our healthy population. There may be differences in the flagging of prior results with similar values performed with this method. Interpretation of those prior results can be made in the context of the updated reference intervals.     Potassium 03/04/2024 4.8  3.4 - 5.3 mmol/L Final    Chloride 03/04/2024 97 (L)  98 - 107 mmol/L Final    Carbon Dioxide (CO2) 03/04/2024 23  22 - 29 mmol/L Final    Anion Gap 03/04/2024 13  7 - 15 mmol/L Final    Urea Nitrogen 03/04/2024 17.9  8.0 - 23.0 mg/dL Final    Creatinine 03/04/2024 1.31 (H)  0.51 - 0.95 mg/dL Final    GFR Estimate 03/04/2024 42 (L)  >60 mL/min/1.73m2 Final    Calcium 03/04/2024 9.5  8.8 - 10.2 mg/dL Final    Glucose 03/04/2024 97  70 - 99 mg/dL Final    Hemoglobin " A1C 03/04/2024 6.5 (H)  0.0 - 5.6 % Final    Normal <5.7%   Prediabetes 5.7-6.4%    Diabetes 6.5% or higher     Note: Adopted from ADA consensus guidelines.       Results for orders placed or performed in visit on 09/05/24   HEMOGLOBIN A1C     Status: Abnormal   Result Value Ref Range    Hemoglobin A1C 6.2 (H) 0.0 - 5.6 %   Basic metabolic panel     Status: Abnormal   Result Value Ref Range    Sodium 139 135 - 145 mmol/L    Potassium 4.8 3.4 - 5.3 mmol/L    Chloride 103 98 - 107 mmol/L    Carbon Dioxide (CO2) 23 22 - 29 mmol/L    Anion Gap 13 7 - 15 mmol/L    Urea Nitrogen 20.6 8.0 - 23.0 mg/dL    Creatinine 1.60 (H) 0.51 - 0.95 mg/dL    GFR Estimate 32 (L) >60 mL/min/1.73m2    Calcium 9.4 8.8 - 10.4 mg/dL    Glucose 78 70 - 99 mg/dL   Albumin Random Urine Quantitative with Creat Ratio     Status: Abnormal   Result Value Ref Range    Creatinine Urine mg/dL 66.8 mg/dL    Albumin Urine mg/L 21.0 mg/L    Albumin Urine mg/g Cr 31.44 (H) 0.00 - 25.00 mg/g Cr   Magnesium     Status: Normal   Result Value Ref Range    Magnesium 2.1 1.7 - 2.3 mg/dL   Sodium     Status: Normal   Result Value Ref Range    Sodium 139 135 - 145 mmol/L   Creatinine, serum     Status: Abnormal   Result Value Ref Range    Creatinine 1.60 (H) 0.51 - 0.95 mg/dL   Fractional excretion of sodium     Status: Abnormal (In process)    Narrative    The following orders were created for panel order Fractional excretion of sodium.  Procedure                               Abnormality         Status                     ---------                               -----------         ------                     Fractional Excretion of ...[[id number]]                      In process                 Sodium[[id number]]                       Normal              Final result               Creatinine, serum[[id number]]            Abnormal            Final result                 Please view results for these tests on the individual orders.       Assessment:     1. Stage 3a  chronic kidney disease (H)    2. Hypomagnesemia    3. Diabetic peripheral neuropathy (H)    4. Type 2 diabetes mellitus with diabetic polyneuropathy, with long-term current use of insulin (H)    5. Primary hypertension        Plan:     Check blood work and urine tests.  Adjust medications as needed.  Consider whether any further kidney evaluation is needed.  Continue current medications otherwise.  Follow up sooner if issues.    Orders Placed This Encounter   Procedures    INFLUENZA HIGH DOSE, TRIVALENT, PF (FLUZONE)    HEMOGLOBIN A1C    Basic metabolic panel    Albumin Random Urine Quantitative with Creat Ratio    Magnesium    Fractional Excretion of Sodium    Sodium    Creatinine, serum    Fractional excretion of sodium           Jonathan Perez MD  General Internal Medicine  [facility]    The longitudinal plan of care for the diagnoses and conditions as documented were addressed during this visit. Due to the added complexity in care, I will continue to support Rhea in the subsequent management and with ongoing continuity of care.     Return in about 26 weeks (around 3/6/2025) for annual wellness visit, visit and blood work.     Future Appointments   Date Time Provider Department Center   3/6/2025 10:30 AM [facility]

## 2024-09-08 DIAGNOSIS — I10 PRIMARY HYPERTENSION: ICD-10-CM

## 2024-09-09 ENCOUNTER — TELEPHONE (OUTPATIENT)
Dept: INTERNAL MEDICINE | Facility: CLINIC | Age: 79
End: 2024-09-09
Payer: COMMERCIAL

## 2024-09-09 DIAGNOSIS — E11.42 TYPE 2 DIABETES MELLITUS WITH DIABETIC POLYNEUROPATHY, WITH LONG-TERM CURRENT USE OF INSULIN (H): ICD-10-CM

## 2024-09-09 DIAGNOSIS — Z79.4 TYPE 2 DIABETES MELLITUS WITH DIABETIC POLYNEUROPATHY, WITH LONG-TERM CURRENT USE OF INSULIN (H): ICD-10-CM

## 2024-09-09 RX ORDER — AMLODIPINE BESYLATE 2.5 MG/1
TABLET ORAL
Qty: 90 TABLET | Refills: 3 | Status: SHIPPED | OUTPATIENT
Start: 2024-09-09

## 2024-09-09 RX ORDER — METFORMIN HCL 500 MG
1000 TABLET, EXTENDED RELEASE 24 HR ORAL DAILY
Qty: 180 TABLET | Refills: 3 | Status: SHIPPED | OUTPATIENT
Start: 2024-09-09 | End: 2025-09-04

## 2024-09-09 NOTE — LETTER
September 10, 2024      Rhea Jasmine  [address] [place] [address]        Dear ,    We are writing to inform you of your test results.    Your electrolytes were normal.  Your kidney tests remain slightly elevated but overall stable over the years.     You have a very small amount of protein in your urine at this time and this is not concerning.     There is no signs of concerning protein in the urine.      Her A1C is lower at this time and I think it would be good for her to reduce her medication at this time.     I would recommend reducing the metformin to 1000 mg (2 pills instead of 4) a day.    Resulted Orders   HEMOGLOBIN A1C   Result Value Ref Range    Hemoglobin A1C 6.2 (H) 0.0 - 5.6 %      Comment:      Normal <5.7%   Prediabetes 5.7-6.4%    Diabetes 6.5% or higher     Note: Adopted from ADA consensus guidelines.   Basic metabolic panel   Result Value Ref Range    Sodium 139 135 - 145 mmol/L    Potassium 4.8 3.4 - 5.3 mmol/L    Chloride 103 98 - 107 mmol/L    Carbon Dioxide (CO2) 23 22 - 29 mmol/L    Anion Gap 13 7 - 15 mmol/L    Urea Nitrogen 20.6 8.0 - 23.0 mg/dL    Creatinine 1.60 (H) 0.51 - 0.95 mg/dL    GFR Estimate 32 (L) >60 mL/min/1.73m2      Comment:      eGFR calculated using 2021 CKD-EPI equation.    Calcium 9.4 8.8 - 10.4 mg/dL      Comment:      Reference intervals for this test were updated on 7/16/2024 to reflect our healthy population more accurately. There may be differences in the flagging of prior results with similar values performed with this method. Those prior results can be interpreted in the context of the updated reference intervals.    Glucose 78 70 - 99 mg/dL   Albumin Random Urine Quantitative with Creat Ratio   Result Value Ref Range    Creatinine Urine mg/dL 66.8 mg/dL      Comment:      The reference ranges have not been established in urine creatinine. The results should be integrated into the clinical context for interpretation.    Albumin Urine mg/L  21.0 mg/L      Comment:      The reference ranges have not been established in urine albumin. The results should be integrated into the clinical context for interpretation.    Albumin Urine mg/g Cr 31.44 (H) 0.00 - 25.00 mg/g Cr      Comment:      Microalbuminuria is defined as an albumin:creatinine ratio of 17 to 299 for males and 25 to 299 for females. A ratio of albumin:creatinine of 300 or higher is indicative of overt proteinuria.  Due to biologic variability, positive results should be confirmed by a second, first-morning random or 24-hour timed urine specimen. If there is discrepancy, a third specimen is recommended. When 2 out of 3 results are in the microalbuminuria range, this is evidence for incipient nephropathy and warrants increased efforts at glucose control, blood pressure control, and institution of therapy with an angiotensin-converting-enzyme (ACE) inhibitor (if the patient can tolerate it).     Magnesium   Result Value Ref Range    Magnesium 2.1 1.7 - 2.3 mg/dL   Sodium   Result Value Ref Range    Sodium 139 135 - 145 mmol/L   Creatinine, serum   Result Value Ref Range    Creatinine 1.60 (H) 0.51 - 0.95 mg/dL       If you have any questions or concerns, please call the clinic at the number listed above.       Sincerely,      Dr. Perez's office

## 2024-09-10 NOTE — TELEPHONE ENCOUNTER
Pt requesting 9/5 lab results mailed to home. Can you please put this in outbox? Thank you.    PETE Tovar.

## 2024-09-10 NOTE — TELEPHONE ENCOUNTER
Please call patient -    ______________________________________________________________________     Home phone:  [phone] (home)     Cell phone:   No relevant phone numbers on file.       Other contacts:  Name Home Phone Work Phone Mobile Phone Relationship Lgl Grd   STACY ZAPATA [phone] JAY Sauceda   [phone] Son      ______________________________________________________________________     Your electrolytes were normal.  Your kidney tests remain slightly elevated but overall stable over the years.    You have a very small amount of protein in your urine at this time and this is not concerning.    There is no signs of concerning protein in the urine.     Her A1C is lower at this time and I think it would be good for her to reduce her medication at this time.    I would recommend reducing the metformin to 1000 mg (2 pills instead of 4) a day.    See you again on:  Future Appointments   Date Time Provider Department Center   3/6/2025 10:30 AM Jonathan Perez MD [facility]        Jonathan Perez MD  [facility]  9/9/2024, 11:41 PM     ______________________________________________________________________    Recent Results (from the past 240 hour(s))   HEMOGLOBIN A1C    Collection Time: 09/05/24 12:17 PM   Result Value Ref Range    Hemoglobin A1C 6.2 (H) 0.0 - 5.6 %   Basic metabolic panel    Collection Time: 09/05/24 12:17 PM   Result Value Ref Range    Sodium 139 135 - 145 mmol/L    Potassium 4.8 3.4 - 5.3 mmol/L    Chloride 103 98 - 107 mmol/L    Carbon Dioxide (CO2) 23 22 - 29 mmol/L    Anion Gap 13 7 - 15 mmol/L    Urea Nitrogen 20.6 8.0 - 23.0 mg/dL    Creatinine 1.60 (H) 0.51 - 0.95 mg/dL    GFR Estimate 32 (L) >60 mL/min/1.73m2    Calcium 9.4 8.8 - 10.4 mg/dL    Glucose 78 70 - 99 mg/dL   Magnesium    Collection Time: 09/05/24 12:17 PM   Result Value Ref Range    Magnesium 2.1 1.7 - 2.3 mg/dL   Sodium    Collection Time: 09/05/24 12:17 PM    Result Value Ref Range    Sodium 139 135 - 145 mmol/L   Creatinine, serum    Collection Time: 09/05/24 12:17 PM   Result Value Ref Range    Creatinine 1.60 (H) 0.51 - 0.95 mg/dL   Albumin Random Urine Quantitative with Creat Ratio    Collection Time: 09/06/24  5:00 AM   Result Value Ref Range    Creatinine Urine mg/dL 66.8 mg/dL    Albumin Urine mg/L 21.0 mg/L    Albumin Urine mg/g Cr 31.44 (H) 0.00 - 25.00 mg/g Cr      ______________________________________________________________________

## 2024-09-10 NOTE — TELEPHONE ENCOUNTER
"Called and spoke with pt regarding lab results. The following message was relayed to pt.    \"Your electrolytes were normal.  Your kidney tests remain slightly elevated but overall stable over the years.     You have a very small amount of protein in your urine at this time and this is not concerning.     There is no signs of concerning protein in the urine.      Her A1C is lower at this time and I think it would be good for her to reduce her medication at this time.     I would recommend reducing the metformin to 1000 mg (2 pills instead of 4) a day.\"    No questions.    PETE Tovar.  "

## 2024-12-31 ENCOUNTER — TELEPHONE (OUTPATIENT)
Dept: INTERNAL MEDICINE | Facility: CLINIC | Age: 79
End: 2024-12-31
Payer: COMMERCIAL

## 2024-12-31 DIAGNOSIS — E11.42 TYPE 2 DIABETES MELLITUS WITH DIABETIC POLYNEUROPATHY, WITH LONG-TERM CURRENT USE OF INSULIN (H): Primary | ICD-10-CM

## 2024-12-31 DIAGNOSIS — Z79.4 TYPE 2 DIABETES MELLITUS WITH DIABETIC POLYNEUROPATHY, WITH LONG-TERM CURRENT USE OF INSULIN (H): Primary | ICD-10-CM

## 2024-12-31 DIAGNOSIS — E11.9 DM TYPE 2 (DIABETES MELLITUS, TYPE 2) (H): ICD-10-CM

## 2024-12-31 NOTE — TELEPHONE ENCOUNTER
New Medication Request      What medication are you requesting?: Ulticare pen needles    Patient was on:     B-D U/F 31G X 8 MM insulin pen needle (Discontinued) 100 each 3 12/16/2021 1/12/2023 No  Sig: USE DAILY AS DIRECTED  Sent to pharmacy as: BD Pen Needle Short U/F 31G X 8 MM (insulin pen needle)  Class: E-Prescribe  Order: [id number]  E-Prescribing Status: Receipt confirmed by pharmacy (12/16/2021  8:38 AM CST)      Reason for medication request:     Cheaper for the patient       Controlled Substance Agreement on file:   CSA -- Patient Level:    CSA: None found at the patient level.       Preferred Pharmacy:  [facility] [id number] [facility] - [place], [place] - [address] [place] [address]  Phone: [phone] Fax: [phone]      Okay to leave a detailed message?: Yes at Home number on file [phone] ([place])

## 2025-01-02 RX ORDER — PEN NEEDLE, DIABETIC 31 GX5/16"
NEEDLE, DISPOSABLE MISCELLANEOUS DAILY
Qty: 100 EACH | Refills: 3 | Status: SHIPPED | OUTPATIENT
Start: 2025-01-02

## 2025-01-02 NOTE — TELEPHONE ENCOUNTER
Done.    Jonathan Perez MD  General Internal Medicine  [facility]  1/2/2025, 1:39 PM

## 2025-01-12 DIAGNOSIS — I10 ESSENTIAL HYPERTENSION: ICD-10-CM

## 2025-01-13 RX ORDER — TORSEMIDE 20 MG/1
40 TABLET ORAL DAILY
Qty: 180 TABLET | Refills: 1 | Status: SHIPPED | OUTPATIENT
Start: 2025-01-13

## 2025-03-06 ENCOUNTER — OFFICE VISIT (OUTPATIENT)
Dept: INTERNAL MEDICINE | Facility: CLINIC | Age: 80
End: 2025-03-06
Payer: COMMERCIAL

## 2025-03-06 ENCOUNTER — TELEPHONE (OUTPATIENT)
Dept: INTERNAL MEDICINE | Facility: CLINIC | Age: 80
End: 2025-03-06

## 2025-03-06 VITALS
HEART RATE: 78 BPM | OXYGEN SATURATION: 99 % | SYSTOLIC BLOOD PRESSURE: 128 MMHG | WEIGHT: 187 LBS | HEIGHT: 65 IN | DIASTOLIC BLOOD PRESSURE: 58 MMHG | BODY MASS INDEX: 31.16 KG/M2 | RESPIRATION RATE: 18 BRPM | TEMPERATURE: 97.6 F

## 2025-03-06 DIAGNOSIS — F33.1 MODERATE RECURRENT MAJOR DEPRESSION (H): ICD-10-CM

## 2025-03-06 DIAGNOSIS — I10 ESSENTIAL HYPERTENSION: ICD-10-CM

## 2025-03-06 DIAGNOSIS — L30.4 INTERTRIGO: ICD-10-CM

## 2025-03-06 DIAGNOSIS — E11.42 DIABETIC PERIPHERAL NEUROPATHY (H): ICD-10-CM

## 2025-03-06 DIAGNOSIS — F33.41 RECURRENT MAJOR DEPRESSIVE DISORDER, IN PARTIAL REMISSION: Chronic | ICD-10-CM

## 2025-03-06 DIAGNOSIS — Z00.00 MEDICARE ANNUAL WELLNESS VISIT, SUBSEQUENT: Primary | ICD-10-CM

## 2025-03-06 DIAGNOSIS — N18.31 STAGE 3A CHRONIC KIDNEY DISEASE (H): ICD-10-CM

## 2025-03-06 LAB
ANION GAP SERPL CALCULATED.3IONS-SCNC: 16 MMOL/L (ref 7–15)
BUN SERPL-MCNC: 32.7 MG/DL (ref 8–23)
CALCIUM SERPL-MCNC: 9.7 MG/DL (ref 8.8–10.4)
CHLORIDE SERPL-SCNC: 99 MMOL/L (ref 98–107)
CREAT SERPL-MCNC: 1.64 MG/DL (ref 0.51–0.95)
EGFRCR SERPLBLD CKD-EPI 2021: 31 ML/MIN/1.73M2
EST. AVERAGE GLUCOSE BLD GHB EST-MCNC: 146 MG/DL
GLUCOSE SERPL-MCNC: 139 MG/DL (ref 70–99)
HBA1C MFR BLD: 6.7 % (ref 0–5.6)
HCO3 SERPL-SCNC: 22 MMOL/L (ref 22–29)
HGB BLD-MCNC: 12 G/DL (ref 11.7–15.7)
POTASSIUM SERPL-SCNC: 4.9 MMOL/L (ref 3.4–5.3)
SODIUM SERPL-SCNC: 137 MMOL/L (ref 135–145)

## 2025-03-06 RX ORDER — NYSTATIN AND TRIAMCINOLONE ACETONIDE 100000; 1 [USP'U]/G; MG/G
CREAM TOPICAL 2 TIMES DAILY
Qty: 60 G | Refills: 5 | Status: SHIPPED | OUTPATIENT
Start: 2025-03-06

## 2025-03-06 SDOH — HEALTH STABILITY: PHYSICAL HEALTH: ON AVERAGE, HOW MANY MINUTES DO YOU ENGAGE IN EXERCISE AT THIS LEVEL?: 0 MIN

## 2025-03-06 SDOH — HEALTH STABILITY: PHYSICAL HEALTH: ON AVERAGE, HOW MANY DAYS PER WEEK DO YOU ENGAGE IN MODERATE TO STRENUOUS EXERCISE (LIKE A BRISK WALK)?: 0 DAYS

## 2025-03-06 ASSESSMENT — PATIENT HEALTH QUESTIONNAIRE - PHQ9: SUM OF ALL RESPONSES TO PHQ QUESTIONS 1-9: 7

## 2025-03-06 ASSESSMENT — PAIN SCALES - GENERAL: PAINLEVEL_OUTOF10: MODERATE PAIN (6)

## 2025-03-06 ASSESSMENT — SOCIAL DETERMINANTS OF HEALTH (SDOH): HOW OFTEN DO YOU GET TOGETHER WITH FRIENDS OR RELATIVES?: NEVER

## 2025-03-06 NOTE — PROGRESS NOTES
[facility]    Comprehensive and complex medical care - Chronic disease management - Shared decision making - Care coordination - Compassionate care    Patient advocacy - Rational deprescribing - Minimally disruptive medicine - Ethical focus - Customized care         Date of Service: 3/6/2025  Primary Provider: Jonathan Perez    Patient Care Team:  Jonathan Perez MD as PCP - General  Jonathan Perez MD as Assigned PCP  Speedy Torres MD as Jim Mims Dpm, DPM (Inactive) as Bianca Bhakta [facility] as Pharmacist (Pharmacist)          Patient's Pharmacy:    [facility] [id number] [facility] [employer] - [place], [place] [address]  [place] [address]  Phone: [phone] Fax: [phone]     Patient's Contacts:  Name Home Phone Work Phone Mobile Phone Relationship Lgl STACY Sousa [phone] JAY Sauceda   [phone] Son      Patient's Insurance:    Payor: [facility] / Plan: [employer] MEDICARE / Product Type: HMO /      Subjective:     History of present illness:    Rhea Jasmine is an 79 year old here for an annual wellness visit.    The issues she would like to address at today's visit include the following:    Chief Complaint   Patient presents with    Wellness Visit     Ulcer on left ankle that patient noticed 2-3 nights ago.            3/6/2025    10:12 AM   Additional Questions   Roomed by Shawn STEEN MA     History of Present Illness    Rhea Jasmine, age: 79 years    Diabetes Management  Rhea reports that her diabetes is generally well-managed. She mentioned consuming a salad with avocado ranch, carrots, and kale, and sucking on peppermint candies, with her blood sugar reading at 116 mg/dL the following morning. She has not experienced low blood sugar episodes recently. Her last A1c was 6.2, which she notes as one of her best results. She continues to take insulin and metformin.    Ulcer on  Left Ankle  Rhea has noticed a small ulcer on the inside of her left ankle, which she discovered the previous night. She reports itching all over but states that the ulcer does not hurt. She applied a topical treatment to the area, which appears irritated but not infected.    Yeast Infection  Rhea reports a yeast infection on both sides of her body, with the right side being worse. She applies cortisone cream, which provides relief. She mentions not wearing a bra at home, as she is often alone, and notes that the infection occurs in skin folds, including the groin and under the breasts.    Back Pain  Rhea continues to experience back pain, which she manages with Tylenol Arthritis 660 mg twice daily, in the morning and at night. She mentions having pinched nerves in her back.    Vision Issues  Rhea reports difficulty reading small print, even with bifocals. She has a cataract in her right eye, which she believes is worsening. She has not had any cataract surgery and last saw an eye specialist in December 2023.    Hearing  Rhea does not report any significant changes in her hearing and does not have a wax buildup in her ears.    Bladder Issues  Rhea is taking oxybutynin for bladder issues but feels it is not effective, as she experiences immediate fullness upon standing in the morning. She is considering discontinuing the medication.    Family Health Concerns  Rhea mentions her sister, who has a pacemaker and recently had an episode requiring hospital care. Her brother-in-law has had throat and lip cancer, with recent pain in the affected area. She expresses concern about her family, noting that her sister is her only remaining close family member.        We reviewed her other issues noted in the assessment but not specifically addressed in the HPI above.           Active Problem List:  Problem List as of 3/6/2025 Reviewed: 10/2/2023  9:23 PM by Jonathan Perez MD         High    Tobacco abuse    Type 2  diabetes mellitus with diabetic polyneuropathy, with long-term current use of insulin (H)    DNR (do not resuscitate)       Medium    Primary hypertension    SHIV (obstructive sleep apnea)    Moderate major depression (H)    Stage 3a chronic kidney disease (H)       Low    Mixed hyperlipidemia    OA (osteoarthritis)    Low back pain radiating to left leg    Tubular adenoma of colon - 2017 - single 9 mm    Mixed incontinence    Microalbuminuria due to type 2 diabetes mellitus (H)    Diabetic peripheral neuropathy (H)    On angiotensin receptor blockers (ARB)    Lumbar spinal stenosis        Past Medical History:   Diagnosis Date    CKD (chronic kidney disease) stage 3, GFR 30-59 ml/min (H)     DM type 2 (diabetes mellitus, type 2) (H)     Dysthymia     HLD (hyperlipidemia)     HTN (hypertension)     Lumbar spinal stenosis 2/15/2023    OA (osteoarthritis)     SHIV (obstructive sleep apnea)     On CPAP    PN (peripheral neuropathy)     Tobacco abuse     Tubular adenoma of colon       Past Surgical History:   Procedure Laterality Date    SEPTOPLASTY      SHOULDER SURGERY   2008    TOTAL SHOULDER ARTHROPLASTY Left 2010    TYMPANOSTOMY TUBE PLACEMENT        Family History   Problem Relation Age of Onset    Colon Cancer Mother     Heart Failure Mother     Diabetes Mother     Diabetes Father     Heart Disease Father     Atrial fibrillation Sister     Obesity Sister     Sarcoidosis Sister     Heart Disease Brother     Cancer Brother         BLADDER    Diabetes Brother     Diabetes Brother     Colon Polyps Son     Colon Polyps Son       Family history is otherwise noncontributory.    Social History     Occupational History    Not on file   Tobacco Use    Smoking status: Every Day    Smokeless tobacco: Never   Vaping Use    Vaping status: Never Used   Substance and Sexual Activity    Alcohol use: Yes    Drug use: No    Sexual activity: Not on file      Social History     Social History Narrative    Retired nursing assistant.     Previous patient of Dr. Ghotra.    Goes to [facility] for exercise.        Current Outpatient Medications   Medication Instructions    acetaminophen (TYLENOL) 1,300 mg, 2 TIMES DAILY    amLODIPine (NORVASC) 2.5 MG tablet TAKE 1 TABLET BY MOUTH EVERY DAY    aspirin (ASA) 81 mg, DAILY    atorvastatin (LIPITOR) 40 MG tablet TAKE 1 TABLET BY MOUTH EVERYDAY AT BEDTIME    citalopram (CELEXA) 20 MG tablet TAKE 1 TABLET BY MOUTH EVERY DAY    fluticasone (FLONASE) 50 mcg/actuation nasal spray 2 sprays, DAILY    gabapentin (NEURONTIN) 300 MG capsule TAKE 1 CAPSULE BY MOUTH THREE TIMES A DAY    insulin pen needle (B-D U/F) 31G X 8 MM miscellaneous As instructed, DAILY    lancets (TRUEPLUS LANCETS) 28 gauge Misc 3 TIMES DAILY    Lantus SoloStar 20 Units, Subcutaneous, AT BEDTIME    losartan (COZAAR) 50 MG tablet TAKE 1 TABLET BY MOUTH EVERY DAY    magnesium oxide (MAG-OX) 400 mg (241.3 mg magnesium) tablet 1 tablet, Oral, 2 TIMES DAILY    magnesium oxide (MAG-OX) 400 mg, Oral, 2 TIMES DAILY    metFORMIN (GLUCOPHAGE XR) 1,000 mg, Oral, DAILY, DOSE CHANGE.    metoprolol succinate ER (TOPROL XL) 50 mg, Oral, DAILY    nystatin-triamcinolone (MYCOLOG II) 951171-2.1 UNIT/GM-% external cream Topical, 2 TIMES DAILY    ONETOUCH VERIO IQ test strip USE 1 EACH AS DIRECTED 2 (TWO) TIMES A DAY.    torsemide (DEMADEX) 40 mg, Oral, DAILY    Vitamin D3 (CHOLECALCIFEROL) 1,000 Units, DAILY    vitamin E (TOCOPHEROL) 400 Units, DAILY      Allergies: Penicillins and Percocet [oxycodone-acetaminophen]     Immunization History   Administered Date(s) Administered    COVID-19 Bivalent 12+ (Pfizer) 02/14/2023    COVID-19 MONOVALENT 12+ (Pfizer) 05/24/2021, 06/14/2021, 12/20/2021    COVID-19 Monovalent 12+ (Pfizer 2022) 06/17/2022    DT (PEDS <7y) 10/01/1995, 04/27/2005    Flu, Unspecified 10/27/2007, 09/27/2011    Influenza (High Dose) Trivalent,PF (Fluzone) 11/03/2014, 02/24/2017, 09/08/2017, 01/14/2019, 11/21/2019, 09/05/2024     "Influenza (IIV3) PF 10/11/2004, 10/12/2005, 10/18/2006, 10/27/2007, 12/12/2008, 09/27/2011, 09/27/2012    Influenza Vaccine 65+ (Fluzone HD) 09/21/2020, 08/18/2021, 02/14/2023, 09/05/2023    Influenza Vaccine, 6+MO IM (QUADRIVALENT W/PRESERVATIVES) 12/12/2008, 09/27/2012    Pneumococcal 23 valent 06/14/2006, 09/01/2011, 08/18/2021    TDAP (Adacel,Boostrix) 03/06/2009, 05/14/2019    Zoster recombinant adjuvanted (SHINGRIX) 05/14/2019, 07/31/2019    Zoster vaccine, live 03/06/2009      Objective:     Wt Readings from Last 3 Encounters:   03/06/25 84.8 kg (187 lb)   09/05/24 83.5 kg (184 lb 1.6 oz)   03/04/24 82.6 kg (182 lb)     BP Readings from Last 3 Encounters:   03/06/25 128/58   09/06/24 138/78   03/04/24 132/72       PHYSICAL EXAM  /58   Pulse 78   Temp 97.6  F (36.4  C) (Oral)   Resp 18   Ht 1.656 m (5' 5.2\")   Wt 84.8 kg (187 lb)   LMP  (LMP Unknown)   SpO2 99%   BMI 30.93 kg/m     The patient is comfortable, no acute distress.  Mood good.  Insight is good.  No skin lesions or nodules of concern.  Ears clear.  Eyes are nonicteric.  Pupils equal and reactive.  Throat is clear.  Neck is supple without mass, no thyromegaly. No cervical or epitrochlear adenopathy.  Heart regular rate and rhythm.  There is a 1/6 systolic murmur ejection quality in the aortic position.  Lungs clear to auscultation bilaterally.  Respiratory effort good.  Abdomen soft and nontender.  No hepatosplenomegaly.  Extremities show trace edema.  There is a little bit more edema on the left than the right.  There is a 1 to 2 mm ulcer over the medial ankle without cellulitis or significant drainage.    Diagnostics:     Office Visit on 09/05/2024   Component Date Value Ref Range Status    Hemoglobin A1C 09/05/2024 6.2 (H)  0.0 - 5.6 % Final    Normal <5.7%   Prediabetes 5.7-6.4%    Diabetes 6.5% or higher     Note: Adopted from ADA consensus guidelines.    Sodium 09/05/2024 139  135 - 145 mmol/L Final    Potassium 09/05/2024 4.8  " 3.4 - 5.3 mmol/L Final    Chloride 09/05/2024 103  98 - 107 mmol/L Final    Carbon Dioxide (CO2) 09/05/2024 23  22 - 29 mmol/L Final    Anion Gap 09/05/2024 13  7 - 15 mmol/L Final    Urea Nitrogen 09/05/2024 20.6  8.0 - 23.0 mg/dL Final    Creatinine 09/05/2024 1.60 (H)  0.51 - 0.95 mg/dL Final    GFR Estimate 09/05/2024 32 (L)  >60 mL/min/1.73m2 Final    eGFR calculated using 2021 CKD-EPI equation.    Calcium 09/05/2024 9.4  8.8 - 10.4 mg/dL Final    Reference intervals for this test were updated on 7/16/2024 to reflect our healthy population more accurately. There may be differences in the flagging of prior results with similar values performed with this method. Those prior results can be interpreted in the context of the updated reference intervals.    Glucose 09/05/2024 78  70 - 99 mg/dL Final    Creatinine Urine mg/dL 09/06/2024 66.8  mg/dL Final    The reference ranges have not been established in urine creatinine. The results should be integrated into the clinical context for interpretation.    Albumin Urine mg/L 09/06/2024 21.0  mg/L Final    The reference ranges have not been established in urine albumin. The results should be integrated into the clinical context for interpretation.    Albumin Urine mg/g Cr 09/06/2024 31.44 (H)  0.00 - 25.00 mg/g Cr Final    Microalbuminuria is defined as an albumin:creatinine ratio of 17 to 299 for males and 25 to 299 for females. A ratio of albumin:creatinine of 300 or higher is indicative of overt proteinuria.  Due to biologic variability, positive results should be confirmed by a second, first-morning random or 24-hour timed urine specimen. If there is discrepancy, a third specimen is recommended. When 2 out of 3 results are in the microalbuminuria range, this is evidence for incipient nephropathy and warrants increased efforts at glucose control, blood pressure control, and institution of therapy with an angiotensin-converting-enzyme (ACE) inhibitor (if the patient  can tolerate it).      Magnesium 09/05/2024 2.1  1.7 - 2.3 mg/dL Final    Sodium 09/05/2024 139  135 - 145 mmol/L Final    Creatinine 09/05/2024 1.60 (H)  0.51 - 0.95 mg/dL Final       No results found for any visits on 03/06/25.         Assessment & Plan     Medicare annual wellness visit, subsequent  - Schedule a six-month follow-up. Check blood work and monitor kidney function. If blood work is normal, results will be mailed; otherwise, a call will be made.    Diabetic peripheral neuropathy (H)  - Blood sugar levels are more unpredictable, but last A1c was good at 6.2.  - Monitor blood sugar levels. No specific changes to current diabetes management mentioned.    Stage 3a chronic kidney disease (H)  - Kidney function was slightly elevated last time but not concerning.  - Monitor kidney function with blood work. Adjust blood pressure medications if necessary.    Essential hypertension  - Blood pressure is stable.  - Continue current management. Monitor kidney function as it may affect blood pressure medication.    Intertrigo  - Intertrigo present under the breasts, worse on the right side.  - Prescribe Micalog cream to be applied twice daily. Ensure the area is kept dry. If the cream is ineffective, consider an ointment.        Depression  -Continue current management plan.  Continue to monitor.    Orders Placed This Encounter   Procedures    HEMOGLOBIN A1C    Hemoglobin    Basic metabolic panel        The longitudinal plan of care for the diagnoses and conditions as documented were addressed during this visit. Due to the added complexity in care, I will continue to support Rhea in the subsequent management and with ongoing continuity of care.     Jonathan Perez MD  General Internal Medicine  [facility]    Return in about 6 months (around 9/6/2025) for visit and blood work.     No future appointments.      Health Maintenance   Topic Date Due    ANNUAL REVIEW OF  ORDERS  Never  done    NICOTINE/TOBACCO CESSATION COUNSELING Q 1 YR  02/14/2024    DIABETIC FOOT EXAM  02/14/2024    COVID-19 Vaccine (6 - 2024-25 season) 09/01/2024    EYE EXAM  12/19/2024    A1C  03/05/2025    HEMOGLOBIN  10/02/2024    DEPRESSION 12 MO INDEX REPEAT PHQ-9  03/20/2025    BMP  09/05/2025    MICROALBUMIN  09/06/2025    PHQ-9  09/06/2025    LIPID  02/14/2026    MEDICARE ANNUAL WELLNESS VISIT  03/06/2026    FALL RISK ASSESSMENT  03/06/2026    DTAP/TDAP/TD IMMUNIZATION (3 - Td or Tdap) 05/14/2029    ADVANCE CARE PLANNING  03/06/2030    DEXA  11/21/2037    INFLUENZA VACCINE  Completed    URINALYSIS  Completed    ZOSTER IMMUNIZATION  Completed    DEPRESSION ACTION PLAN  Addressed    HPV IMMUNIZATION  Aged Out    MENINGITIS IMMUNIZATION  Aged Out    HEPATITIS C SCREENING  Discontinued    Pneumococcal Vaccine: 50+ Years  Discontinued    COLORECTAL CANCER SCREENING  Discontinued    RSV VACCINE  Discontinued    LUNG CANCER SCREENING  Discontinued        ______________________________________________________________________     Additional required elements:    I have reviewed Opioid Use Disorder and Substance Use Disorder risk factors and made any needed referrals.  This may not apply to this individual patient, but this documentation is required by Medicare.    Counseling     Appropriate preventive services were addressed with this patient via screening, questionnaire, or discussion as appropriate for fall prevention, nutrition, physical activity, Tobacco-use cessation, social engagement, weight loss and cognition.  Checklist reviewing preventive services available has been given to the patient.    Memory screen:      3/4/2024    11:57 AM 3/6/2025    10:26 AM   MINI COG   Clock Draw Score 2 Normal 2 Normal   3 Item Recall 3 objects recalled 3 objects recalled   Mini Cog Total Score 5 5        PHQ-2 Score:         11/30/2021     1:23 PM   PHQ-2 ( 1999 Pfizer)   Q1: Little interest or pleasure in doing things Not at all   Q2:  Feeling down, depressed or hopeless Not at all   PHQ-2 Score 0        Advanced care planning reviewed.        3/6/2025   Fall Risk   Fallen 2 or more times in the past year? No     No    Trouble with walking or balance? No     No        Proxy-reported    Multiple values from one day are sorted in reverse-chronological order        Last vision screening (if done):       No data to display

## 2025-03-06 NOTE — PATIENT INSTRUCTIONS
Patient Education   Hearing Loss: Care Instructions  Overview     Hearing loss is a sudden or slow decrease in how well you hear. It can range from slight to profound. Permanent hearing loss can occur with aging. It also can happen when you are exposed long-term to loud noise. Examples include listening to loud music, riding motorcycles, or being around other loud machines.  Hearing loss can affect your work and home life. It can make you feel lonely or depressed. You may feel that you have lost your independence. But hearing aids and other devices can help you hear better and feel connected to others.  Follow-up care is a key part of your treatment and safety. Be sure to make and go to all appointments, and call your doctor if you are having problems. It's also a good idea to know your test results and keep a list of the medicines you take.  How can you care for yourself at home?  Avoid loud noises whenever possible. This helps keep your hearing from getting worse.  Always wear hearing protection around loud noises.  Wear a hearing aid as directed.  A professional can help you pick a hearing aid that will work best for you.  You can also get hearing aids over the counter for mild to moderate hearing loss.  Have hearing tests as your doctor suggests. They can show whether your hearing has changed. Your hearing aid may need to be adjusted.  Use other devices as needed. These may include:  Telephone amplifiers and hearing aids that can connect to a television, stereo, radio, or microphone.  Devices that use lights or vibrations. These alert you to the doorbell, a ringing telephone, or a baby monitor.  Television closed-captioning. This shows the words at the bottom of the screen. Most new TVs can do this.  TTY (text telephone). This lets you type messages back and forth on the telephone instead of talking or listening. These devices are also called TDD. When messages are typed on the keyboard, they are sent over the  "phone line to a receiving TTY. The message is shown on a monitor.  Use text messaging, social media, and email if it is hard for you to communicate by telephone.  Try to learn a listening technique called speechreading. It is not lipreading. You pay attention to people's gestures, expressions, posture, and tone of voice. These clues can help you understand what a person is saying. Face the person you are talking to, and have them face you. Make sure the lighting is good. You need to see the other person's face clearly.  Think about counseling if you need help to adjust to your hearing loss.  When should you call for help?  Watch closely for changes in your health, and be sure to contact your doctor if:    You think your hearing is getting worse.     You have new symptoms, such as dizziness or nausea.   Where can you learn more?  Go to https://www.[employer].Hotelbar/patiented  Enter R798 in the search box to learn more about \"Hearing Loss: Care Instructions.\"  Current as of: September 27, 2023  Content Version: 14.3    2024 [employer].   Care instructions adapted under license by your healthcare professional. If you have questions about a medical condition or this instruction, always ask your healthcare professional. [employer] disclaims any warranty or liability for your use of this information.    Relationships for Good Health  Relationships are important for our health and happiness. Social isolation, loneliness and lack of support are bad for your health. Studies show that loneliness can harm health and limit your life span as much as high blood pressure and smoking.   Take some time to reflect on your relationships. Then answer these questions:  Are there people in your life that cause you stress or drain your energy? What can you do to set " limits?  ________________________________________________________________________________________________________________________________________________________________________________________________________________________________________________________________________________________________________________________________________________  Who do you enjoy spending time with? Who can you go to for support?  ________________________________________________________________________________________________________________________________________________________________________________________________________________________________________________________________________________________________________________________________________________  What can you do to improve your relationships with others?  __________________________________________________________________________________________________________________________________________________________________________________________________________________  ______________________________________________________________________________________________________________________________  What do you like most about your relationships with others?  ________________________________________________________________________________________________________________________________________________________________________________________________________________________________________________________________________________________________________________________________________________  My goal: ______________________________________________________________________  I will: ______________________________________________________________________________________________________________________________________________________________________________________________    For informational purposes only. Not to replace the advice of your health care provider. Copyright   2018  [facility]. All rights reserved. Clinically reviewed by [facility] [employer]  Team. [employer] 187516 - Rev 06/24.  Learning About Stress  What is stress?     Stress is your body's response to a hard situation. Your body can have a physical, emotional, or mental response. Stress is a fact of life for most people, and it affects everyone differently. What causes stress for you may not be stressful for someone else.  A lot of things can cause stress. You may feel stress when you go on a job interview, take a test, or run a race. This kind of short-term stress is normal and even useful. It can help you if you need to work hard or react quickly. For example, stress can help you finish an important job on time.  Long-term stress is caused by ongoing stressful situations or events. Examples of long-term stress include long-term health problems, ongoing problems at work, or conflicts in your family. Long-term stress can harm your health.  How does stress affect your health?  When you are stressed, your body responds as though you are in danger. It makes hormones that speed up your heart, make you breathe faster, and give you a burst of energy. This is called the fight-or-flight stress response. If the stress is over quickly, your body goes back to normal and no harm is done.  But if stress happens too often or lasts too long, it can have bad effects. Long-term stress can make you more likely to get sick, and it can make symptoms of some diseases worse. If you tense up when you are stressed, you may develop neck, shoulder, or low back pain. Stress is linked to high blood pressure and heart disease.  Stress also harms your emotional health. It can make you danielson, tense, or depressed. Your relationships may suffer, and you may not do well at work or school.  What can you do to manage stress?  You can try these things to help manage stress:   Do something active. Exercise or activity can help reduce stress.  Walking is a great way to get started. Even everyday activities such as housecleaning or yard work can help.  Try yoga or jeremy chi. These techniques combine exercise and meditation. You may need some training at first to learn them.  Do something you enjoy. For example, listen to music or go to a movie. Practice your hobby or do volunteer work.  Meditate. This can help you relax, because you are not worrying about what happened before or what may happen in the future.  Do guided imagery. Imagine yourself in any setting that helps you feel calm. You can use online videos, books, or a teacher to guide you.  Do breathing exercises. For example:  From a standing position, bend forward from the waist with your knees slightly bent. Let your arms dangle close to the floor.  Breathe in slowly and deeply as you return to a standing position. Roll up slowly and lift your head last.  Hold your breath for just a few seconds in the standing position.  Breathe out slowly and bend forward from the waist.  Let your feelings out. Talk, laugh, cry, and express anger when you need to. Talking with supportive friends or family, a counselor, or a radha leader about your feelings is a healthy way to relieve stress. Avoid discussing your feelings with people who make you feel worse.  Write. It may help to write about things that are bothering you. This helps you find out how much stress you feel and what is causing it. When you know this, you can find better ways to cope.  What can you do to prevent stress?  You might try some of these things to help prevent stress:  Manage your time. This helps you find time to do the things you want and need to do.  Get enough sleep. Your body recovers from the stresses of the day while you are sleeping.  Get support. Your family, friends, and community can make a difference in how you experience stress.  Limit your news feed. Avoid or limit time on social media or news that may make you feel  "stressed.  Do something active. Exercise or activity can help reduce stress. Walking is a great way to get started.  Where can you learn more?  Go to https://www.[employer].net/patiented  Enter N032 in the search box to learn more about \"Learning About Stress.\"  Current as of: October 24, 2023  Content Version: 14.3    2024 [employer].   Care instructions adapted under license by your healthcare professional. If you have questions about a medical condition or this instruction, always ask your healthcare professional. [employer] disclaims any warranty or liability for your use of this information.    Bladder Training: Care Instructions  Your Care Instructions     Bladder training is used to treat urge incontinence and stress incontinence. Urge incontinence means that the need to urinate comes on so fast that you can't get to a toilet in time. Stress incontinence means that you leak urine because of pressure on your bladder. For example, it may happen when you laugh, cough, or lift something heavy.  Bladder training can increase how long you can wait before you have to urinate. It can also help your bladder hold more urine. And it can give you better control over the urge to urinate.  It is important to remember that bladder training takes a few weeks to a few months to make a difference. You may not see results right away, but don't give up.  Follow-up care is a key part of your treatment and safety. Be sure to make and go to all appointments, and call your doctor if you are having problems. It's also a good idea to know your test results and keep a list of the medicines you take.  How can you care for yourself at home?  Work with your doctor to come up with a bladder training program that is right for you. You may use one or more of the following methods.  Delayed urination  In the beginning, try to keep from urinating for 5 minutes after you first feel the need to go.  While you wait, take " "deep, slow breaths to relax. Kegel exercises can also help you delay the need to go to the bathroom.  After some practice, when you can easily wait 5 minutes to urinate, try to wait 10 minutes before you urinate.  Slowly increase the waiting period until you are able to control when you have to urinate.  Scheduled urination  Empty your bladder when you first wake up in the morning.  Schedule times throughout the day when you will urinate.  Start by going to the bathroom every hour, even if you don't need to go.  Slowly increase the time between trips to the bathroom.  When you have found a schedule that works well for you, keep doing it.  If you wake up during the night and have to urinate, do it. Apply your schedule to waking hours only.  Kegel exercises  These tighten and strengthen pelvic muscles, which can help you control the flow of urine. (If doing these exercises causes pain, stop doing them and talk with your doctor.) To do Kegel exercises:  Squeeze your muscles as if you were trying not to pass gas. Or squeeze your muscles as if you were stopping the flow of urine. Your belly, legs, and buttocks shouldn't move.  Hold the squeeze for 3 seconds, then relax for 5 to 10 seconds.  Start with 3 seconds, then add 1 second each week until you are able to squeeze for 10 seconds.  Repeat the exercise 10 times a session. Do 3 to 8 sessions a day.  When should you call for help?  Watch closely for changes in your health, and be sure to contact your doctor if:    Your incontinence is getting worse.     You do not get better as expected.   Where can you learn more?  Go to https://www.[facility].net/patiented  Enter V684 in the search box to learn more about \"Bladder Training: Care Instructions.\"  Current as of: April 30, 2024  Content Version: 14.3    2024 "[employer].   Care instructions adapted under license by your healthcare professional. If you have questions about a medical condition or this " instruction, always ask your healthcare professional. [employer] disclaims any warranty or liability for your use of this information.    Learning About Depression Screening  What is depression screening?  Depression screening is a way to see if you have depression symptoms. It may be done by a doctor or counselor. It's often part of a routine checkup. That's because your mental health is just as important as your physical health.  Depression is a mental health condition that affects how you feel, think, and act. You may:  Have less energy.  Lose interest in your daily activities.  Feel sad and grouchy for a long time.  Depression is very common. It affects people of all ages.  Many things can lead to depression. Some people become depressed after they have a stroke or find out they have a major illness like cancer or heart disease. The death of a loved one or a breakup may lead to depression. It can run in families. Most experts believe that a combination of inherited genes and stressful life events can cause it.  What happens during screening?  You may be asked to fill out a form about your depression symptoms. You and the doctor will discuss your answers. The doctor may ask you more questions to learn more about how you think, act, and feel.  What happens after screening?  If you have symptoms of depression, your doctor will talk to you about your options.  Doctors usually treat depression with medicines or counseling. Often, combining the two works best. Many people don't get help because they think that they'll get over the depression on their own. But people with depression may not get better unless they get treatment.  The cause of depression is not well understood. There may be many factors involved. But if you have depression, it's not your fault.  A serious symptom of depression is thinking about death or suicide. If you or someone you care about talks about this or about feeling hopeless, get  "help right away.  It's important to know that depression can be treated. Medicine, counseling, and self-care may help.  Where can you learn more?  Go to https://www.[employer].net/patiented  Enter T185 in the search box to learn more about \"Learning About Depression Screening.\"  Current as of: July 31, 2024  Content Version: 14.3    2024 [employer].   Care instructions adapted under license by your healthcare professional. If you have questions about a medical condition or this instruction, always ask your healthcare professional. [employer] disclaims any warranty or liability for your use of this information.       Based on our discussion, I have outlined the following instructions for you:    - Schedule a follow-up appointment in six months to check your blood work and monitor kidney function.  - If your blood work results are normal, you will receive them by mail. If there are any concerns, you will receive a phone call.  - Keep an eye on your blood sugar levels regularly.  - Continue with your current diabetes management plan.  - Monitor your kidney function with blood work as advised.  - Continue with your current blood pressure management plan.  - Apply Micalog cream under your breasts twice a day.  - Keep the area under your breasts dry.  - If the Micalog cream does not work, consider using an ointment.    Thank you again for your visit, and we look forward to supporting you in your journey to better health.      "

## 2025-03-07 NOTE — TELEPHONE ENCOUNTER
Please call patient -    ______________________________________________________________________     Home phone:  [phone] (home)     Cell phone:   No relevant phone numbers on file.       Other contacts:  Name Home Phone Work Phone Mobile Phone Relationship Lgl Grd   STACY ZAPATA [phone] JAY Sauceda   [phone] Son      ______________________________________________________________________     Her A1c is up but not too high.  It is 6.7 and still doing well.    Her hemoglobin is normal.  No signs of anemia.    Her kidney function remains slightly elevated but not horrible.    However, with this, I would like to consider reducing the torsemide slightly for her.  This might help her kidney work a bit better.    I would recommend reducing the torsemide to 1 pill a day instead of 2 pills a day.  Please confirm dosing and use with the patient.    Please schedule her for a 6-month visit if she is willing.    Please let me know her thoughts and I can update the prescription if she wishes.    Jonathan Perez MD  [facility]  3/6/2025, 7:35 PM     ______________________________________________________________________    Recent Results (from the past 240 hours)   HEMOGLOBIN A1C    Collection Time: 03/06/25 11:11 AM   Result Value Ref Range    Estimated Average Glucose 146 (H) <117 mg/dL    Hemoglobin A1C 6.7 (H) 0.0 - 5.6 %   Hemoglobin    Collection Time: 03/06/25 11:11 AM   Result Value Ref Range    Hemoglobin 12.0 11.7 - 15.7 g/dL   Basic metabolic panel    Collection Time: 03/06/25 11:11 AM   Result Value Ref Range    Sodium 137 135 - 145 mmol/L    Potassium 4.9 3.4 - 5.3 mmol/L    Chloride 99 98 - 107 mmol/L    Carbon Dioxide (CO2) 22 22 - 29 mmol/L    Anion Gap 16 (H) 7 - 15 mmol/L    Urea Nitrogen 32.7 (H) 8.0 - 23.0 mg/dL    Creatinine 1.64 (H) 0.51 - 0.95 mg/dL    GFR Estimate 31 (L) >60 mL/min/1.73m2    Calcium 9.7 8.8 - 10.4 mg/dL    Glucose 139 (H) 70 -  99 mg/dL      ______________________________________________________________________

## 2025-03-11 DIAGNOSIS — N39.46 MIXED INCONTINENCE: ICD-10-CM

## 2025-03-11 DIAGNOSIS — F32.1 MODERATE MAJOR DEPRESSION (H): ICD-10-CM

## 2025-03-11 RX ORDER — CITALOPRAM HYDROBROMIDE 20 MG/1
TABLET ORAL
Qty: 90 TABLET | Refills: 3 | Status: SHIPPED | OUTPATIENT
Start: 2025-03-11

## 2025-03-11 RX ORDER — OXYBUTYNIN CHLORIDE 15 MG/1
15 TABLET, EXTENDED RELEASE ORAL DAILY
Qty: 90 TABLET | Refills: 3 | Status: SHIPPED | OUTPATIENT
Start: 2025-03-11 | End: 2026-03-06

## 2025-03-18 ENCOUNTER — TELEPHONE (OUTPATIENT)
Dept: INTERNAL MEDICINE | Facility: CLINIC | Age: 80
End: 2025-03-18
Payer: COMMERCIAL

## 2025-03-18 NOTE — TELEPHONE ENCOUNTER
Patient calling stating PCP advised her lab results would be sent in mail.  Patient reports she has not received them  Verified address on file.

## 2025-04-10 DIAGNOSIS — Z79.4 TYPE 2 DIABETES MELLITUS WITH DIABETIC POLYNEUROPATHY, WITH LONG-TERM CURRENT USE OF INSULIN (H): ICD-10-CM

## 2025-04-10 DIAGNOSIS — E11.42 TYPE 2 DIABETES MELLITUS WITH DIABETIC POLYNEUROPATHY, WITH LONG-TERM CURRENT USE OF INSULIN (H): ICD-10-CM

## 2025-04-10 RX ORDER — INSULIN GLARGINE 100 [IU]/ML
20 INJECTION, SOLUTION SUBCUTANEOUS AT BEDTIME
Qty: 18 ML | Refills: 0 | Status: SHIPPED | OUTPATIENT
Start: 2025-04-10

## 2025-04-12 DIAGNOSIS — E78.2 MIXED HYPERLIPIDEMIA: ICD-10-CM

## 2025-04-12 DIAGNOSIS — I10 ESSENTIAL HYPERTENSION: ICD-10-CM

## 2025-04-14 RX ORDER — METOPROLOL SUCCINATE 50 MG/1
50 TABLET, EXTENDED RELEASE ORAL DAILY
Qty: 90 TABLET | Refills: 2 | Status: SHIPPED | OUTPATIENT
Start: 2025-04-14

## 2025-04-14 RX ORDER — ATORVASTATIN CALCIUM 40 MG/1
TABLET, FILM COATED ORAL
Qty: 30 TABLET | Refills: 0 | Status: SHIPPED | OUTPATIENT
Start: 2025-04-14

## 2025-04-21 ENCOUNTER — TRANSFERRED RECORDS (OUTPATIENT)
Dept: ADMINISTRATIVE | Facility: CLINIC | Age: 80
End: 2025-04-21
Payer: COMMERCIAL

## 2025-04-22 NOTE — PROCEDURES
[facility]   [address] [place] [address], [address], [place] [address]     Patient Name: Rhea Jasmine  Gender:  Female  Exam Date: 04/21/2025 Visit Number:  [id number]  Age: 80 Years YOB: 1945  Attending MD: Claudio Pineda MD Medical Record#:  [id number]    Procedure: Colonoscopy   Indications: Previous adenomatous polyp(s)      Referring MD: Referral Self  Primary MD:      Jonathan Perez MD  Medications: Admitting Medications:   0.9% Normal Saline at TKO   Intra Procedure Medications:   Patient received monitored anesthesia care.     Complications: No immediate complications  ______________________________________________________________________________  Procedure:   An examination of the heart and lungs was performed and found to be within acceptable limits.  .  The patient was therefore deemed a reasonable candidate for endoscopy and sedation.   The risks and benefits of the procedure were explained to the patient.After obtaining informed consent, the patient received monitored anesthesia care and I passed the scope   without difficulty via the rectum to the ileum.  The appendiceal orifice and ic valve were identified.  The scope was retroflexed during the examination  The quality of the prep was good  (Golytely).    This was a complete examination throughout the entire colon.    Findings:    Polyp location: ascending colon.  Quantity: 2.  Size:  4-5 mm, 5-10 mm.  Polyp shape:  sessile.         Maneuver: polypectomy was performed with a cold snare and cold biopsy forceps.       Removal:  complete.  Retrieval: complete.  Bleeding: none.    Polyp location: transverse colon.  Quantity: 2.  Size:  4-5 mm.  Polyp shape: sessile.         Maneuver: polypectomy was performed with a  cold biopsy forceps.       Removal: complete.  Retrieval: complete.  Bleeding: none.    Diverticulosis.  Location: - ascending colon - descending colon - sigmoid.    Impression:  History of  adenomatous polyp of colon    Preliminary Plan:  The patient and their physician will receive a copy of the pathology report as well as pathology-based recommendations for future screening or surveillance.  Pathology Results:  A: COLON, ASCENDING, POLYPS:           1. Tubular adenomas (2), one of which is an advanced adenoma           2. Negative for high grade dysplasia           3. Per the colonoscopy report:               a. Polyp sizes: 4-5 mm and 5-10 mm               b. Resection: Complete               c. Retrieval: Complete      B: COLON, TRANSVERSE, POLYPS:           1. Tubular adenomas (2)           2. Negative for high grade dysplasia           3. Per the colonoscopy report:               a. Polyp sizes: 4 mm - 5 mm               b. Resection: Complete               c. Retrieval: Complete      COMMENTS  A. Advanced adenoma of the colorectum is defined by the American College of Gastroenterology (ACG) as an adenoma that is 1 cm or more in size, contains an appreciable villous component, or has high grade dysplasia (Kenroy SPANGLER; Polyp Guideline: Diagnosis, Treatment, and Surveillance for Patients with Colorectal Polyps. Am J Gastroenterol 2000;95(11):9249-5629).  This polyp qualifies as such.  Patients with advanced adenomas are at increased risk for synchronous and metachronous additional advanced adenomas.  Appropriate follow-up is suggested.      We are aware of the patient now having at least 10 adenomatous polyps of the colon  ( 4 currently and at least 10 cumulatively in prior colonoscopies). Consideration for a referral to a genetics specialist for possible genetic testing to evaluate for a potential polyposis syndrome (attenuated familial adenomatous polyposis or MYH polyposis in particular) is reasonable as the results may have implications for both the patient and immediate family members (if not already considered).    Reference:  Kath LANDEROS, Brice S. When Should Patients Undergo Genetic Testing for  Hereditary Colon Cancer Syndromes? Clin Gastroenterol Hepatol. 2018 Feb;16(2):181-183. Epub 2017 Nov 10. PMID: [id number].      MICROSCOPIC  A: Performed   B: Performed     Electronically signed by: Jethro Hurst MD    Interpreted at [facility], [address] [place] [id number]    Final Plan:  Repeat colonoscopy in 3 years.    We will attempt to contact you at appropriate intervals via U.S. mail.  We may not be able to find you or contact you at that time, therefore you should know that the responsibility for following our recommendation rests with you.  If you don't hear from us at the time your procedure is due, please contact our office to schedule an appointment.  If your contact information should change, please contact our office so that we can update your record.      _Electronically signed by:___________________  Claudio Pineda MD                 04/21/2025    cc: Jonathan Perez MD

## 2025-04-29 DIAGNOSIS — E78.2 MIXED HYPERLIPIDEMIA: ICD-10-CM

## 2025-04-29 RX ORDER — ATORVASTATIN CALCIUM 40 MG/1
40 TABLET, FILM COATED ORAL AT BEDTIME
Qty: 90 TABLET | Refills: 3 | Status: SHIPPED | OUTPATIENT
Start: 2025-04-29

## 2025-04-29 NOTE — TELEPHONE ENCOUNTER
Medication Question or Refill        What medication are you calling about (include dose and sig)?:        Disp Refills Start End ERNIE   atorvastatin (LIPITOR) 40 MG tablet 30 tablet 0 4/14/2025 -- No   Sig: TAKE 1 TABLET BY MOUTH EVERYDAY AT BEDTIME   Sent to pharmacy as: Atorvastatin Calcium 40 MG Oral Tablet (LIPITOR)   Class: E-Prescribe   Order: [id number]   E-Prescribing Status: Receipt confirmed by pharmacy (4/14/2025  4:20 PM CDT)   Renewals         Preferred Pharmacy:  [facility] [id number] [facility] - [place], [place] - [address] [place] [address]  Phone: [phone] Fax: [phone]      Controlled Substance Agreement on file:   CSA -- Patient Level:    CSA: None found at the patient level.       Who prescribed the medication?: PCP    Do you need a refill? Yes    When did you use the medication last? 4/14/2025    Asking for a 90 day supply   -Writer knows its to early, just adding fax requests     Okay to leave a detailed message?: N/A at Home number on file [phone] (home)

## 2025-05-01 ENCOUNTER — TELEPHONE (OUTPATIENT)
Dept: PHARMACY | Facility: OTHER | Age: 80
End: 2025-05-01
Payer: COMMERCIAL

## 2025-05-01 NOTE — TELEPHONE ENCOUNTER
ANKUR Recruitment: [facility] insurance     Referral outreach attempt #1 on May 1, 2025      Outcome: left voicemail- Call back number [phone]    See Hector TRIPP   [phone]

## 2025-05-05 ENCOUNTER — HOSPITAL ENCOUNTER (EMERGENCY)
Facility: HOSPITAL | Age: 80
Discharge: HOME | End: 2025-05-06
Attending: EMERGENCY MEDICINE | Admitting: EMERGENCY MEDICINE
Payer: COMMERCIAL

## 2025-05-05 ENCOUNTER — APPOINTMENT (OUTPATIENT)
Dept: CT IMAGING | Facility: HOSPITAL | Age: 80
End: 2025-05-05
Attending: EMERGENCY MEDICINE
Payer: COMMERCIAL

## 2025-05-05 VITALS
DIASTOLIC BLOOD PRESSURE: 78 MMHG | OXYGEN SATURATION: 98 % | HEART RATE: 79 BPM | SYSTOLIC BLOOD PRESSURE: 178 MMHG | RESPIRATION RATE: 16 BRPM | WEIGHT: 183 LBS | TEMPERATURE: 97.8 F | BODY MASS INDEX: 30.49 KG/M2 | HEIGHT: 65 IN
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
## 2025-05-05 DIAGNOSIS — S01.01XA SCALP LACERATION, INITIAL ENCOUNTER: ICD-10-CM
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
## 2025-05-05 PROCEDURE — 99284 EMERGENCY DEPT VISIT MOD MDM: CPT | Mod: 25

## 2025-05-05 PROCEDURE — 271N000002 HC RX 271: Performed by: EMERGENCY MEDICINE

## 2025-05-05 PROCEDURE — 72125 CT NECK SPINE W/O DYE: CPT

## 2025-05-05 PROCEDURE — 250N000009 HC RX 250: Performed by: EMERGENCY MEDICINE

## 2025-05-05 PROCEDURE — 12001 RPR S/N/AX/GEN/TRNK 2.5CM/<: CPT

## 2025-05-05 PROCEDURE — 70450 CT HEAD/BRAIN W/O DYE: CPT

## 2025-05-05 RX ORDER — METHYLCELLULOSE 4000CPS 30 %
POWDER (GRAM) MISCELLANEOUS ONCE
Status: COMPLETED | OUTPATIENT
Start: 2025-05-05 | End: 2025-05-05

## 2025-05-05 RX ADMIN — Medication 3 ML: at 22:57

## 2025-05-05 RX ADMIN — METHYLCELLULOSE: 2 POWDER, FOR SOLUTION ORAL at 22:56

## 2025-05-05 ASSESSMENT — COLUMBIA-SUICIDE SEVERITY RATING SCALE - C-SSRS
1. IN THE PAST MONTH, HAVE YOU WISHED YOU WERE DEAD OR WISHED YOU COULD GO TO SLEEP AND NOT WAKE UP?: NO
2. HAVE YOU ACTUALLY HAD ANY THOUGHTS OF KILLING YOURSELF IN THE PAST MONTH?: NO
6. HAVE YOU EVER DONE ANYTHING, STARTED TO DO ANYTHING, OR PREPARED TO DO ANYTHING TO END YOUR LIFE?: NO

## 2025-05-05 ASSESSMENT — ACTIVITIES OF DAILY LIVING (ADL): ADLS_ACUITY_SCORE: 41

## 2025-05-06 ENCOUNTER — TELEPHONE (OUTPATIENT)
Dept: INTERNAL MEDICINE | Facility: CLINIC | Age: 80
End: 2025-05-06
Payer: COMMERCIAL

## 2025-05-06 NOTE — TELEPHONE ENCOUNTER
Reason for Call:  Appointment Request    Patient requesting this type of appt:  Staple removal     Requested provider: MA/CRISTINO/LPN Visit    Reason patient unable to be scheduled: Not within requested timeframe    When does patient want to be seen/preferred time: 3-7 days    Comments: Sometime next week for staple removal     Okay to leave a detailed message?: Yes at Cell number on file:    No relevant phone numbers on file.       Call taken on 5/6/2025 at 10:27 AM by Vale Javier

## 2025-05-06 NOTE — ED TRIAGE NOTES
Pt states she was outside on her driveway 30 minutes ago when she lost her balance and fell down onto asphalt, hitting the right side of head. Pt denies loss of consciousness and denies taking blood thinners. Pt denies nausea. There appears to be a laceration to right side of head. Ice pack in place.     Triage Assessment (Adult)       Row Name 05/05/25 203          Triage Assessment    Airway WDL WDL        Respiratory WDL    Respiratory WDL WDL        Skin Circulation/Temperature WDL    Skin Circulation/Temperature WDL WDL        Cardiac WDL    Cardiac WDL WDL        Peripheral/Neurovascular WDL    Peripheral Neurovascular WDL WDL        Cognitive/Neuro/Behavioral WDL    Cognitive/Neuro/Behavioral WDL WDL

## 2025-05-06 NOTE — TELEPHONE ENCOUNTER
Reviewed hospital note. They suggested a follow-up within 7 days with PCP. Are you okay to override?

## 2025-05-06 NOTE — ED PROVIDER NOTES
EMERGENCY DEPARTMENT ENCOUNTER      NAME: Rhea Jasmine  AGE: 80 year old female  YOB: 1945  MRN: [id number]  EVALUATION DATE & TIME: No admission date for patient encounter.    PCP: Jonathan Perez    ED PROVIDER: Clarence Willams M.D.      Chief Complaint   Patient presents with    Fall    Head Injury         FINAL IMPRESSION:  1. Fall at home, initial encounter    2. Scalp laceration, initial encounter          ED COURSE & MEDICAL DECISION MAKING:    10:07 PM Met with patient for initial interview and exam. Discussed initial plan for care for their stay in the emergency department.  10:54 PM repeat exam is benign.  Discussed findings and need for laceration repair.  11:24 PM Repeat exam.  Performed laceration repair.  Discussed wound care management, discharge and close follow-up.    Pertinent Labs & Imaging studies reviewed. (See chart for details)  80 year old female presents to the Emergency Department for evaluation of fall. Patient appears non toxic with stable vitals signs, patient afebrile with no tachycardia or hypoxia.  Lungs are clear and abdomen is benign.  Exam significant for laceration to the right parietal scalp with bleeding well-controlled, no palpable skull depressions.  Per review of the medical record, did review office visit through internal medicine [facility] on 3/6/2025, patient noted to have history of chronic kidney disease, diabetes, hypertension, lumbar spinal stenosis, patient states she takes no medications for anticoagulation.  Denies any true vertiginous symptoms, room spinning, no lightheadedness, chest pain or other presyncopal symptoms.  This sounds to be mechanical fall.  Considered labs, ECG but again nothing to suggest syncopal episode, seizure, CVA, vertigo.  No loss of consciousness, no blood thinners but patient is at risk, we will obtain CT imaging of the head and cervical spine, back exams otherwise benign with no new pain.   Nothing to suggest an thoracic, intra-abdominal or extremity trauma.  Patient offered but deferred pain medications.  Tetanus last updated in 2019.    Reassessment: CT imaging by my independent interpretation showed no intracranial mass and by report showed no acute concerning findings in the head or C-spine.  Repeat exam is benign.  Again, wound was thoroughly irrigated inspected in a bloodless field no gross contamination foreign body retention, wound was appropriately repaired and dressed and wound care education provided.  Will recommend conservative management at home with rest, Tylenol, close follow-up with primary care in the next 7 days for repeat wound inspection and staple removal.  Discussed all these finds her conditions with the patient felt reassured comfortable discharge.  Return precaution provided.    Medical Decision Making  I reviewed the EMR: Outpatient Record: see above  Discharge. No recommendations on prescription strength medication(s). See documentation for any additional details.    MIPS (CTPE, Dental pain, Garner, Sinusitis, Asthma/COPD, Head Trauma): Adult Minor Head Trauma:Age 65 years or older    SEPSIS: None          At the conclusion of the encounter I discussed the results of all of the tests and the disposition. The questions were answered and return precautions provided. The patient or family acknowledged understanding and was agreeable with the care plan.         MEDICATIONS GIVEN IN THE EMERGENCY:  Medications   lido-EPINEPHrine-tetracaine (LET) topical gel GEL (3 mLs Topical $Given 5/5/25 2257)   methylcellulose powder ( Topical $Given 5/5/25 2256)       NEW PRESCRIPTIONS STARTED AT TODAY'S ER VISIT  Discharge Medication List as of 5/5/2025 11:40 PM               =================================================================    HPI    Patient information was obtained from: patient     Use of Intrepreter: N/A         Rhea Jasmine is a 80 year old female who presents for  "evaluation after a fall.     Prior to arrival, the patient was outside helping with some yard work when she turned around and fell to the ground. She was not have any dizziness, lightheadedness, or chest pain  prior to the fall. She hit her head on the ground and has a laceration on the back of her head. She did not lose consciousness. The patient is not on blood thinners. She did not hurt anything else in the fall. She denies any pain, headache, or loss of vision.     Per [facility]: The patient's last tdap was 2019.         VITALS:  Patient Vitals for the past 24 hrs:   BP Temp Temp src Pulse Resp SpO2 Height Weight   05/05/25 2348 (!) 178/78 -- -- 79 -- 98 % -- --   05/05/25 2034 (!) 184/78 97.8  F (36.6  C) Oral 85 16 96 % 1.651 m (5' 5\") 83 kg (183 lb)        PHYSICAL EXAM    Constitutional:  Awake, alert, in no apparent distress  HENT: No palpable skull depressions or signs of basilar skull injury, laceration over the parietal scalp, bleeding controlled. Bilateral external ears normal. Oropharynx moist. Nose normal. Neck- Normal range of motion with no guarding, No midline cervical tenderness, Supple, No stridor.   Eyes:  PERRL, EOMI with no signs of entrapment, Conjunctiva normal, No discharge.   Respiratory:  Normal breath sounds, No respiratory distress, No wheezing.    Cardiovascular:  Normal heart rate, Normal rhythm, No appreciable rubs or gallops.   GI:  Soft, No tenderness, No distension, No palpable masses  Musculoskeletal:  Intact distal pulses, No edema. Good range of motion in all major joints. No tenderness to palpation or major deformities noted.  Back-nontender along midline cervical, thoracic and lumbar spine with no step-offs or signs of trauma  Integument:  Warm, Dry, No erythema, No rash.   Neurologic:  Alert & oriented, Normal motor function, Normal sensory function, No focal deficits noted.   Psychiatric:  Affect normal, Judgment normal, Mood normal.     LAB:  All pertinent labs reviewed and " interpreted.  Results for orders placed or performed during the hospital encounter of 05/05/25   Head CT w/o contrast    Impression    IMPRESSION:  HEAD CT:  1.  No CT evidence for acute intracranial process.  2.  Brain atrophy and presumed chronic microvascular ischemic changes as above.    CERVICAL SPINE CT:  1.  No CT evidence for acute fracture or posttraumatic subluxation.   CT Cervical Spine w/o Contrast    Impression    IMPRESSION:  HEAD CT:  1.  No CT evidence for acute intracranial process.  2.  Brain atrophy and presumed chronic microvascular ischemic changes as above.    CERVICAL SPINE CT:  1.  No CT evidence for acute fracture or posttraumatic subluxation.       RADIOLOGY:  CT Cervical Spine w/o Contrast   Final Result   IMPRESSION:   HEAD CT:   1.  No CT evidence for acute intracranial process.   2.  Brain atrophy and presumed chronic microvascular ischemic changes as above.      CERVICAL SPINE CT:   1.  No CT evidence for acute fracture or posttraumatic subluxation.      Head CT w/o contrast   Final Result   IMPRESSION:   HEAD CT:   1.  No CT evidence for acute intracranial process.   2.  Brain atrophy and presumed chronic microvascular ischemic changes as above.      CERVICAL SPINE CT:   1.  No CT evidence for acute fracture or posttraumatic subluxation.             EKG:      I have independently reviewed and interpreted the EKG(s) documented above.    PROCEDURES:   PROCEDURE: Laceration Repair   INDICATIONS: Laceration   PROCEDURE PROVIDER: Dr Clarence Willams   SITE: Right parietal scalp   TYPE/SIZE: simple, clean, and no foreign body visualized  2.5 cm (total length)   FUNCTIONAL ASSESSMENT: Distal sensation, circulation, and motor intact   MEDICATION: LET   PREPARATION: irrigation with Normal saline   DEBRIDEMENT: no debridement   CLOSURE:  Superficial layer closed with 5 staples    Total number of sutures/staples placed: 5           [facility] [employer] System Documentation:   CMS Diagnoses:  None      I, Corrine Huggins, am serving as a scribe to document services personally performed by Clarence Willams MD, based on my observation and the provider's statements to me. I, Clarence Willams MD attest that Corrine Huggins is acting in a scribe capacity, has observed my performance of the services and has documented them in accordance with my direction.    Clarence Willams M.D.  Emergency Medicine  [facility] EMERGENCY DEPARTMENT  [address] [place] [id number]  [phone]  Dept: [phone]     Clarence Willams MD  05/06/25 0051

## 2025-05-06 NOTE — TELEPHONE ENCOUNTER
Pt is scheduled for hospital f/up on Monday 5/12 @ 0800     Wendy Puckett RN on 5/6/2025 at 2:31 PM

## 2025-05-06 NOTE — TELEPHONE ENCOUNTER
May use a reserved slot for this.    Jonathan Perez MD  General Internal Medicine  [facility]  5/6/2025, 11:30 AM

## 2025-05-07 NOTE — PROGRESS NOTES
Medication Therapy Management (MTM) Encounter    ASSESSMENT:                            Medication Adherence/Access: No issues identified.    Diabetes   A1c at goal <7%, SMBG slightly below fasting goal  at times but due to patient concern regarding A1c and low frequency of hypoglycemia, no dose changes recommended today. Would recommend decreasing Lantus dose if hypoglycemia starts to happen more frequently. Will have patient continue to monitor closely.  Aspirin is no longer recommended for primary prevention after the age of 70 so could discontinue this, however, will continue based on patient preference.  Last UACR was slightly above goal >30 mg/g, appropriately taking an ACE inhibitor. Could consider SGLT2 inhibitor, but would be reasonable to recheck UACR prior to this.  Due for eye and foot exam, vaccines UTD.    Hypertension   Blood pressures typically at goal <140/90 mmHg, most recent reading was likely elevated due to pain and anxiety from fall. Will continue to monitor.    Hyperlipidemia   Last LDL not at goal <70 mg/dL, but this was 2 years ago. Would be reasonable to recheck before considering medication changes.    Allergy   Stable.     Mental Health   Depression  Stable.     Supplements   Stable.     Pain   Maximum recommended gabapentin dose based on kidney function would be 600 mg/day, so would encourage patient to reduce to twice daily as previously suggested by PCP.     Skin Rash  Stable.    PLAN:                            We will need to recheck your protein in your urine and cholesterol with Dr. Perez  Consider reducing your gabapentin to 300 mg twice daily, you can talk more about this with Dr. Perez at your upcoming appointment.   Let us know if your blood sugars start to happen more frequently or you start to feel symptoms when they happen  Get an appointment scheduled for an eye exam    Follow-up: I will check to see if labs are done at either of your upcoming PCP appointments. If  everything still stable then next appointment in 1 year, sooner if needed    SUBJECTIVE/OBJECTIVE:                          Rhea Jasmine is a 80 year old female seen for an initial visit. She was referred to me from her healthcare plan. She was also discharged from  [facility] on  for a fall.    Reason for visit: Comprehensive medication review.    Allergies/ADRs: Reviewed in chart  Past Medical History: Reviewed in chart  Tobacco: She reports that she has been smoking cigarettes. She has never used smokeless tobacco.Nicotine/Tobacco Cessation Plan  Information offered: Patient not interested at this time  Alcohol: rarely  Caffeine: 1-2 pots of coffee per day, occasional Coke Zero  Medication Adherence/Access: Patient takes medications directly from bottles.  Patient takes medications 2 time(s) per day.   Per patient, misses medication 0 times per week.   -----------------------------------------------------------------------------------------------------------------  Diabetes   Lantus 20 units at bedtime  Metformin XR 1000 mg daily  Aspirin 81mg daily for primary prevention - prefers to stay on this due to strong family history  Patient is not experiencing side effects.  Current diabetes symptoms: hypoglycemia down in the 60s but no symptoms, happening <1 time per week. Patient mentions that she doesn't like the fact that her A1c went up on her last check even though it was still at goal.     Blood sugar monitorin-2 times per day, fasting (patient reported) around 70s-80s mg/dL  Eye exam in the last 12 months? No - planning to schedule an appointment  Foot exam: due    Hypertension   Amlodipine 2.5 mg daily  Losartan 50 mg daily  Metoprolol ER 50 mg daily  Torsemide 20 mg daily  Patient reports no current medication side effects  Patient does not self-monitor blood pressure.       Hyperlipidemia   Atorvastatin 40 mg daily  Patient reports no significant myalgias or other side effects.     Allergy    Flonase (fluticasone) nasal spray - 2 spray(s) each nostril once daily as needed - not needing right now  Patient reports no current medication side effects.    Patient feels that current therapy is effective.      Mental Health   Depression  Citalopram 20 mg once daily  Patient reports no current medication side effects.  Patient reports symptoms are stable.     Supplements   Vitamin D 1000 units daily  Magnesium oxide 400 mg daily  Vitamin E 400 units daily  Multivitamin with iron daily  No reported issues at this time.      Pain   Acetaminophen 1300 mg twice daily  Gabapentin 300 mg three times daily  Patient reports she is not sure if this is very effective, had been previously recommended to try a lower gabapentin dose to see if there is a change but has not done this.    Skin Rash  Nystatin/triamcinolone cream twice daily as needed - not needing currently  Patient reports rash resolved after stopping oxybutynin.      Today's Vitals: LMP  (LMP Unknown)   ----------------  Post Discharge Medication Reconciliation Status: discharge medications reconciled and changed, per note/orders.    I spent 32 minutes with this patient today. I offer these changes for consideration by Jonathan Perez MD. A copy of the visit note was provided to the patient's provider(s).    A summary of these recommendations was mailed to the patient.    Callie Kline PharmD  Medication Therapy Management Pharmacy Resident  [facility]    Preceptor cosignature: Rhea Jasmine was seen independently by Dr. Kline. I have reviewed the assessment and plan. Lina Howell, Keith, SEBASTIÁN, BCACP      Telemedicine Visit Details  The patient's medications can be safely assessed via a telemedicine encounter.  Type of service:  Telephone visit  Originating Location (pt. Location): Home    Distant Location (provider location):  On-site  Start Time: 10:00 AM  End Time: 10:32 AM     Medication Therapy Recommendations  Low back  pain radiating to left leg   1 Current Medication: gabapentin (NEURONTIN) 300 MG capsule   Current Medication Sig: TAKE 1 CAPSULE BY MOUTH THREE TIMES A DAY   Rationale: Dose too high - Dosage too high - Safety   Recommendation: Decrease Dose - gabapentin 300 MG capsule - twice daily   Status: Contact Provider - Awaiting Response   Identified Date: 5/8/2025         Type 2 diabetes mellitus with diabetic polyneuropathy, with long-term current use of insulin (H)   1 Current Medication: aspirin 81 MG EC tablet   Current Medication Sig: [ASPIRIN 81 MG EC TABLET] Take 81 mg by mouth daily.   Rationale: No medical indication at this time - Unnecessary medication therapy - Indication   Recommendation: Discontinue Medication   Status: Declined per Patient   Identified Date: 5/8/2025 Completed Date: 5/8/2025

## 2025-05-08 ENCOUNTER — VIRTUAL VISIT (OUTPATIENT)
Dept: PHARMACY | Facility: CLINIC | Age: 80
End: 2025-05-08
Payer: COMMERCIAL

## 2025-05-08 ENCOUNTER — TELEPHONE (OUTPATIENT)
Dept: INTERNAL MEDICINE | Facility: CLINIC | Age: 80
End: 2025-05-08
Payer: COMMERCIAL

## 2025-05-08 DIAGNOSIS — I10 PRIMARY HYPERTENSION: Chronic | ICD-10-CM

## 2025-05-08 DIAGNOSIS — R21 RASH: ICD-10-CM

## 2025-05-08 DIAGNOSIS — E78.2 MIXED HYPERLIPIDEMIA: ICD-10-CM

## 2025-05-08 DIAGNOSIS — M54.50 LOW BACK PAIN RADIATING TO LEFT LEG: ICD-10-CM

## 2025-05-08 DIAGNOSIS — M79.605 LOW BACK PAIN RADIATING TO LEFT LEG: ICD-10-CM

## 2025-05-08 DIAGNOSIS — F33.41 RECURRENT MAJOR DEPRESSIVE DISORDER, IN PARTIAL REMISSION: Chronic | ICD-10-CM

## 2025-05-08 DIAGNOSIS — E11.42 TYPE 2 DIABETES MELLITUS WITH DIABETIC POLYNEUROPATHY, WITH LONG-TERM CURRENT USE OF INSULIN (H): Primary | Chronic | ICD-10-CM

## 2025-05-08 DIAGNOSIS — Z78.9 TAKES DIETARY SUPPLEMENTS: ICD-10-CM

## 2025-05-08 DIAGNOSIS — Z79.4 TYPE 2 DIABETES MELLITUS WITH DIABETIC POLYNEUROPATHY, WITH LONG-TERM CURRENT USE OF INSULIN (H): Primary | Chronic | ICD-10-CM

## 2025-05-08 DIAGNOSIS — J30.2 SEASONAL ALLERGIC RHINITIS, UNSPECIFIED TRIGGER: ICD-10-CM

## 2025-05-08 NOTE — PATIENT INSTRUCTIONS
"Recommendations from today's MTM visit:                                                    MTM (medication therapy management) is a service provided by a clinical pharmacist designed to help you get the most of out of your medicines.   Today we reviewed what your medicines are for, how to know if they are working, that your medicines are safe and how to make your medicine regimen as easy as possible.      We will need to recheck your protein in your urine and cholesterol with Dr. Perez  Consider reducing your gabapentin to 300 mg twice daily, you can talk more about this with Dr. Perez at your upcoming appointment.   Let us know if your blood sugars start to happen more frequently or you start to feel symptoms when they happen  Get an appointment scheduled for an eye exam    Follow-up: I will check to see if labs are done at either of your upcoming PCP appointments. If everything still stable then next appointment in 1 year, sooner if needed    It was great speaking with you today.  I value your experience and would be very thankful for your time in providing feedback in our clinic survey. In the next few days, you may receive an email or text message from [employer] with a link to a survey related to your  clinical pharmacist.\"     To schedule another MTM appointment, please call the clinic directly or you may call the MTM scheduling line at [phone].     My Clinical Pharmacist's contact information:                                                      Please feel free to contact me with any questions or concerns you have.      Callie Kline, PharmD  Medication Therapy Management Pharmacy Resident  [facility]  [phone]   "

## 2025-05-08 NOTE — LETTER
_  Medication List        Prepared on: May 8, 2025     Bring your Medication List when you go to the doctor, hospital, or   emergency room. And, share it with your family or caregivers.     Note any changes to how you take your medications.  Cross out medications when you no longer use them.    Medication How I take it Why I use it Prescriber   acetaminophen (TYLENOL) 650 MG CR tablet Take 1,300 mg by mouth 2 times daily.  Pain Patient Reported   amLODIPine (NORVASC) 2.5 MG tablet TAKE 1 TABLET BY MOUTH EVERY DAY Primary Hypertension Jonathan Perez MD   aspirin 81 MG EC tablet [ASPIRIN 81 MG EC TABLET] Take 81 mg by mouth daily.  ASCVD Prevention Precious Y Ken   atorvastatin (LIPITOR) 40 MG tablet Take 1 tablet (40 mg) by mouth at bedtime. Mixed Hyperlipidemia Jonathan Perez MD   cholecalciferol, vitamin D3, 1,000 unit tablet [CHOLECALCIFEROL, VITAMIN D3, 1,000 UNIT TABLET] Take 1,000 Units by mouth daily.  General Health Historical Provider   citalopram (CELEXA) 20 MG tablet TAKE 1 TABLET BY MOUTH EVERY DAY Moderate Major Depression (H) Jonathan Perez MD   FLUTICASONE PROPIONATE, NASAL, 50 MCG/ACT SUSP Spray 2 sprays into both nostrils daily as needed.  Allergies Jonathan Perez MD   gabapentin (NEURONTIN) 300 MG capsule TAKE 1 CAPSULE BY MOUTH THREE TIMES A DAY Lumbar Radicular Pain Jonathan Perez MD   insulin glargine (LANTUS SOLOSTAR) 100 UNIT/ML pen INJECT 20 UNITS SUBCUTANEOUSLY AT BEDTIME Type 2 diabetes mellitus with diabetic polyneuropathy, with long-term current use of insulin (H) Jonathan Perez MD   insulin pen needle (B-D U/F) 31G X 8 MM miscellaneous daily. Type 2 diabetes mellitus with diabetic polyneuropathy, with long-term current use of insulin (H) Jonathan Perez MD   lancets (TRUEPLUS LANCETS) 28 gauge Misc [LANCETS (TRUEPLUS LANCETS) 28 GAUGE MISC] Use As Directed 3 (three) times a day.  Type 2 diabetes mellitus with diabetic polyneuropathy, with long-term current use of insulin (H) Joanne JAIMES  MD Brandin   losartan (COZAAR) 50 MG tablet TAKE 1 TABLET BY MOUTH EVERY DAY Essential Hypertension Jonathan Perez MD   magnesium oxide 400 MG tablet Take 1 tablet (400 mg) by mouth 2 times daily. Hypomagnesemia Jonathan Perez MD   metFORMIN (GLUCOPHAGE XR) 500 MG 24 hr tablet Take 2 tablets (1,000 mg) by mouth daily. DOSE CHANGE. Type 2 diabetes mellitus with diabetic polyneuropathy, with long-term current use of insulin (H) Jonathan Perez MD   metoprolol succinate ER (TOPROL XL) 50 MG 24 hr tablet TAKE 1 TABLET BY MOUTH EVERY DAY Essential Hypertension Jonathan Perez MD   Multiple Vitamins-Iron (MULTIVITAMIN/IRON PO) Take 1 tablet by mouth daily.  General Health Patient Reported   nystatin-triamcinolone (MYCOLOG II) 930418-2.1 UNIT/GM-% external cream Apply topically 2 times daily. Intertrigo Jonathan Perez MD   ONETOUCH VERIO IQ test strip USE 1 EACH AS DIRECTED 2 (TWO) TIMES A DAY. Type 2 diabetes mellitus with diabetic polyneuropathy, with long-term current use of insulin (H) Jonathan Perez MD   torsemide (DEMADEX) 20 MG tablet Take 1 tablet (20 mg) by mouth daily. Dose change.  No need to refill at this time, but keep on file for next refill. Essential Hypertension Jonathan Perez MD   vitamin E 400 UNIT capsule [VITAMIN E 400 UNIT CAPSULE] Take 400 Units by mouth daily.  General Health Historical Provider         Add new medications, over-the-counter drugs, herbals, vitamins, or  minerals in the blank rows below.    Medication How I take it Why I use it Prescriber                                      Allergies:      - Penicillins - Unknown  - Percocet [oxycodone-acetaminophen] - Hives        Side effects I have had:      Not on File        Other Information:              My notes and questions:

## 2025-05-08 NOTE — TELEPHONE ENCOUNTER
Situation: patient fell and hit her head on Monday.  She has staples on her head.    Background: patient seen in ER 5/5/25.    Assessment: patient has not cleaned her head since she had staples. No pain.    Recommendation: patient needed to know how to wash her hair with the staples. Instructions explained to patient.     Madelyn Moss RN on 5/8/2025 at 10:55 AM

## 2025-05-08 NOTE — LETTER
"Recommended To-Do List      Prepared on: May 8, 2025       You can get the best results from your medications by completing the items on this \"To-Do List.\"      Bring your To-Do List when you go to your doctor. And, share it with your family or caregivers.    My To-Do List:  What we talked about: What I should do:   Your medication dosage being too high    Talk with Dr. Perez about decreasing your dosage of gabapentin (NEURONTIN)          What we talked about: What I should do:                     "

## 2025-05-08 NOTE — LETTER
Called pt and verified regarding her Botox and stated she can\"t afford the Botox because the insurance is not covering 100% , it will cost her a $2,000.  Pt is asking Dr. Evans if she can have the injectable every month instead. Will relay to Dr. Evans.   May 8, 2025  Rhea ESCAMILLA [place]  [address] [place] [address]    Dear Ms. Jasmine, [facility]     Thank you for talking with me on May 8, 2025 about your health and medications. As a follow-up to our conversation, I have included two documents:      Your Recommended To-Do List has steps you should take to get the best results from your medications.  Your Medication List will help you keep track of your medications and how to take them.    If you want to talk about these documents, please call Callie Kline RPH at phone: [phone], Monday-Friday 8-4:30pm.    I look forward to working with you and your doctors to make sure your medications work well for you.    Sincerely,  Callie Kline RPH  [facility] Pharmacist, [facility]

## 2025-05-12 ENCOUNTER — OFFICE VISIT (OUTPATIENT)
Dept: INTERNAL MEDICINE | Facility: CLINIC | Age: 80
End: 2025-05-12
Payer: COMMERCIAL

## 2025-05-12 VITALS
DIASTOLIC BLOOD PRESSURE: 84 MMHG | WEIGHT: 182 LBS | HEART RATE: 64 BPM | BODY MASS INDEX: 30.32 KG/M2 | RESPIRATION RATE: 20 BRPM | HEIGHT: 65 IN | OXYGEN SATURATION: 99 % | SYSTOLIC BLOOD PRESSURE: 135 MMHG | TEMPERATURE: 97.8 F
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
## 2025-05-12 DIAGNOSIS — I10 PRIMARY HYPERTENSION: Chronic | ICD-10-CM

## 2025-05-12 DIAGNOSIS — S01.01XA LACERATION OF SCALP, INITIAL ENCOUNTER: Primary | ICD-10-CM

## 2025-05-12 DIAGNOSIS — E11.42 DIABETIC PERIPHERAL NEUROPATHY (H): ICD-10-CM

## 2025-05-12 DIAGNOSIS — Z09 HOSPITAL DISCHARGE FOLLOW-UP: ICD-10-CM

## 2025-05-12 DIAGNOSIS — Z48.02 ENCOUNTER FOR STAPLE REMOVAL: ICD-10-CM

## 2025-05-12 PROCEDURE — G2211 COMPLEX E/M VISIT ADD ON: HCPCS | Performed by: INTERNAL MEDICINE

## 2025-05-12 PROCEDURE — 3079F DIAST BP 80-89 MM HG: CPT | Performed by: INTERNAL MEDICINE

## 2025-05-12 PROCEDURE — 3075F SYST BP GE 130 - 139MM HG: CPT | Performed by: INTERNAL MEDICINE

## 2025-05-12 PROCEDURE — 99214 OFFICE O/P EST MOD 30 MIN: CPT | Performed by: INTERNAL MEDICINE

## 2025-05-12 ASSESSMENT — PATIENT HEALTH QUESTIONNAIRE - PHQ9: SUM OF ALL RESPONSES TO PHQ QUESTIONS 1-9: 21

## 2025-05-12 NOTE — PROGRESS NOTES
[address] [address]  Phone: [phone]  Fax: [phone]    The secret of the care of the patient is in caring for the patient.  - Dr. Francis Peabody  ______________________________________________________________________     Date of Service: 5/12/2025  Primary Provider: Jonathan Perez    Patient Care Team:  Jonathan Perez MD as PCP - General  Jonathan Perez MD as Assigned PCP  Speedy Torres MD as Jim Mims Dpm, DPM (Inactive) as Bianca Bhakta [facility] as Pharmacist (Pharmacist)  Callie Kline [facility] as Pharmacist (Pharmacist)     ______________________________________________________________________     Chief Complaint   Patient presents with    Hospital F/U     05/05/25 [facility] ED for fall at home          5/12/2025     7:51 AM   Additional Questions   Roomed by MARIOLA Rooney     History of Present Illness    Rhea Jasmine, 80 years    Head Injury  - Fell on the driveway while shoveling leaves, resulting in a scalp laceration  - Incident occurred on asphalt, which was loose with rocks  - Did not lose consciousness during the fall  - Received staples for the scalp laceration  - No headaches reported post-fall  - Washed hair on Saturday to remove crusted blood    Colonoscopy  - Underwent a colonoscopy recently  - Preparation for the procedure was challenging due to kidney disease, requiring a special solution  - Experienced significant discomfort and fatigue post-procedure  - Did not take medications, including insulin, for two days following the procedure  - Previous colonoscopies revealed polyps, with a larger one found in the past    Medication Management  - Discussed gabapentin with a pharmacist  - Decided to maintain current dosage without reduction    Knee Issues  - Difficulty getting up after the fall due to knee problems    Family Support  - Son and granddaughter provided assistance post-procedure  and during recovery from the fall    Blood Pressure  - Blood pressure was assessed during a previous visit in March, and results were satisfactory.       ______________________________________________________________________     Active Problem List:  Problem List as of 5/12/2025 Reviewed: 5/12/2025 11:52 AM by Jonathan Perez MD         High    Tobacco abuse    Type 2 diabetes mellitus with diabetic polyneuropathy, with long-term current use of insulin (H)    DNR (do not resuscitate)       Medium    Primary hypertension    SHIV (obstructive sleep apnea)    Moderate major depression (H)    Stage 3a chronic kidney disease (H)       Low    Mixed hyperlipidemia    OA (osteoarthritis)    Low back pain radiating to left leg    Tubular adenoma of colon - 2017 - single 9 mm    Mixed incontinence    Microalbuminuria due to type 2 diabetes mellitus (H)    Diabetic peripheral neuropathy (H)    On angiotensin receptor blockers (ARB)    Lumbar spinal stenosis     Current Outpatient Medications   Medication Instructions    acetaminophen (TYLENOL) 1,300 mg, 2 TIMES DAILY    amLODIPine (NORVASC) 2.5 MG tablet TAKE 1 TABLET BY MOUTH EVERY DAY    aspirin (ASA) 81 mg, DAILY    atorvastatin (LIPITOR) 40 mg, Oral, AT BEDTIME    citalopram (CELEXA) 20 MG tablet TAKE 1 TABLET BY MOUTH EVERY DAY    FLUTICASONE PROPIONATE, NASAL, 50 MCG/ACT SUSP 2 sprays, DAILY PRN    gabapentin (NEURONTIN) 300 MG capsule TAKE 1 CAPSULE BY MOUTH THREE TIMES A DAY    insulin pen needle (B-D U/F) 31G X 8 MM miscellaneous As instructed, DAILY    lancets (TRUEPLUS LANCETS) 28 gauge Misc 3 TIMES DAILY    Lantus SoloStar 20 Units, Subcutaneous, AT BEDTIME    losartan (COZAAR) 50 MG tablet TAKE 1 TABLET BY MOUTH EVERY DAY    magnesium oxide (MAG-OX) 400 mg, Oral, 2 TIMES DAILY    metFORMIN (GLUCOPHAGE XR) 1,000 mg, Oral, DAILY, DOSE CHANGE.    metoprolol succinate ER (TOPROL XL) 50 mg, Oral, DAILY    Multiple Vitamins-Iron (MULTIVITAMIN/IRON PO) 1 tablet, DAILY     "nystatin-triamcinolone (MYCOLOG II) 078336-1.1 UNIT/GM-% external cream Topical, 2 TIMES DAILY    ONETOUCH VERIO IQ test strip USE 1 EACH AS DIRECTED 2 (TWO) TIMES A DAY.    torsemide (DEMADEX) 20 mg, Oral, DAILY, Dose change.  No need to refill at this time, but keep on file for next refill.    Vitamin D3 (CHOLECALCIFEROL) 1,000 Units, DAILY    vitamin E (TOCOPHEROL) 400 Units, DAILY     Social History     Social History Narrative    Retired nursing assistant.    Previous patient of Dr. Ghotra.    Goes to [facility] for exercise.        ______________________________________________________________________     Wt Readings from Last 3 Encounters:   05/12/25 82.6 kg (182 lb)   05/05/25 83 kg (183 lb)   03/06/25 84.8 kg (187 lb)     BP Readings from Last 3 Encounters:   05/12/25 135/84   05/05/25 (!) 178/78   03/06/25 128/58     /84   Pulse 64   Temp 97.8  F (36.6  C) (Oral)   Resp 20   Ht 1.651 m (5' 5\")   Wt 82.6 kg (182 lb)   LMP  (LMP Unknown)   SpO2 99%   BMI 30.29 kg/m     The patient is comfortable, no acute distress.  Mood good.  Insight good.  Laceration on the right parietal scalp looks goo and healed and no signs of infection. Heart regular rate and rhythm.  Lungs clear to auscultation bilaterally.  Respiratory effort is good.    ______________________________________________________________________     No results found for any visits on 05/12/25.   ______________________________________________________________________     Diagnoses managed today:    1. Laceration of scalp, initial encounter    2. Fall, initial encounter    3. Diabetic peripheral neuropathy (H)    4. Encounter for staple removal    5. Primary hypertension    6. Hospital discharge follow-up       ______________________________________________________________________   Assessment & Plan     Laceration of scalp, initial encounter  - Scalp laceration healing well; staples placed initially.  - Staples removed during " this visit. Advise gentle hair washing for the next week, avoiding aggressive scrubbing. Normal hair care can resume after two weeks.    Diabetic peripheral neuropathy (H)  - Diabetic peripheral neuropathy noted; no changes in medication discussed.  - Continue current medication regimen. No adjustments needed at this time.    Primary hypertension  - Blood pressure was stable during the last assessment in March. Continue monitoring and current treatment regimen.    Encounter for staple removal  - Staples removed successfully; healing progressing well.  - Follow-up visit scheduled for September, right after Labor Day.        Continue current medications otherwise.  Follow up sooner if issues.    No orders of the defined types were placed in this encounter.     Issues to follow up on:  Follow up blood work next visit and general check.    Jonathan Perez MD  General Internal Medicine  [facility]    The longitudinal plan of care for the diagnoses and conditions as documented were addressed during this visit. Due to the added complexity in care, I will continue to support Rhea in the subsequent management and with ongoing continuity of care.     Return in about 4 months (around 9/5/2025).     Future Appointments   Date Time Provider Department Center   9/5/2025 10:30 AM Jonathan Perez MD [facility]

## 2025-05-12 NOTE — PATIENT INSTRUCTIONS
Future Appointments   Date Time Provider Department Center   9/5/2025 10:30 AM Jonathan Perez MD [facility]

## 2025-06-04 ENCOUNTER — TRANSFERRED RECORDS (OUTPATIENT)
Dept: MULTI SPECIALTY CLINIC | Facility: CLINIC | Age: 80
End: 2025-06-04
Payer: COMMERCIAL

## 2025-06-04 LAB — RETINOPATHY: NORMAL

## 2025-06-21 DIAGNOSIS — Z79.4 TYPE 2 DIABETES MELLITUS WITH DIABETIC POLYNEUROPATHY, WITH LONG-TERM CURRENT USE OF INSULIN (H): ICD-10-CM

## 2025-06-21 DIAGNOSIS — E11.42 TYPE 2 DIABETES MELLITUS WITH DIABETIC POLYNEUROPATHY, WITH LONG-TERM CURRENT USE OF INSULIN (H): ICD-10-CM

## 2025-06-23 RX ORDER — INSULIN GLARGINE 100 [IU]/ML
20 INJECTION, SOLUTION SUBCUTANEOUS AT BEDTIME
Qty: 30 ML | Refills: 0 | Status: SHIPPED | OUTPATIENT
Start: 2025-06-23

## 2025-07-26 DIAGNOSIS — I10 ESSENTIAL HYPERTENSION: ICD-10-CM

## 2025-07-28 RX ORDER — LOSARTAN POTASSIUM 50 MG/1
50 TABLET ORAL DAILY
Qty: 90 TABLET | Refills: 3 | Status: SHIPPED | OUTPATIENT
Start: 2025-07-28